# Patient Record
Sex: MALE | Race: WHITE | NOT HISPANIC OR LATINO | Employment: OTHER | URBAN - METROPOLITAN AREA
[De-identification: names, ages, dates, MRNs, and addresses within clinical notes are randomized per-mention and may not be internally consistent; named-entity substitution may affect disease eponyms.]

---

## 2017-05-01 ENCOUNTER — TRANSCRIBE ORDERS (OUTPATIENT)
Dept: ADMINISTRATIVE | Age: 77
End: 2017-05-01

## 2017-05-01 ENCOUNTER — LAB (OUTPATIENT)
Dept: LAB | Age: 77
End: 2017-05-01
Payer: MEDICARE

## 2017-05-01 DIAGNOSIS — N39.0 URINARY TRACT INFECTION, SITE NOT SPECIFIED: ICD-10-CM

## 2017-05-01 DIAGNOSIS — R53.83 FATIGUE, UNSPECIFIED TYPE: ICD-10-CM

## 2017-05-01 DIAGNOSIS — N39.0 URINARY TRACT INFECTION, SITE NOT SPECIFIED: Primary | ICD-10-CM

## 2017-05-01 LAB
ALBUMIN SERPL BCP-MCNC: 3.4 G/DL (ref 3.5–5)
ALP SERPL-CCNC: 47 U/L (ref 46–116)
ALT SERPL W P-5'-P-CCNC: 24 U/L (ref 12–78)
ANION GAP SERPL CALCULATED.3IONS-SCNC: 6 MMOL/L (ref 4–13)
AST SERPL W P-5'-P-CCNC: 16 U/L (ref 5–45)
BILIRUB SERPL-MCNC: 1.05 MG/DL (ref 0.2–1)
BUN SERPL-MCNC: 13 MG/DL (ref 5–25)
CALCIUM SERPL-MCNC: 8.8 MG/DL (ref 8.3–10.1)
CHLORIDE SERPL-SCNC: 105 MMOL/L (ref 100–108)
CO2 SERPL-SCNC: 29 MMOL/L (ref 21–32)
CREAT SERPL-MCNC: 0.92 MG/DL (ref 0.6–1.3)
GFR SERPL CREATININE-BSD FRML MDRD: >60 ML/MIN/1.73SQ M
GLUCOSE P FAST SERPL-MCNC: 103 MG/DL (ref 65–99)
LDLC SERPL DIRECT ASSAY-MCNC: 115 MG/DL (ref 0–100)
POTASSIUM SERPL-SCNC: 4.3 MMOL/L (ref 3.5–5.3)
PROT SERPL-MCNC: 6.8 G/DL (ref 6.4–8.2)
SODIUM SERPL-SCNC: 140 MMOL/L (ref 136–145)

## 2017-05-01 PROCEDURE — 36415 COLL VENOUS BLD VENIPUNCTURE: CPT

## 2017-05-01 PROCEDURE — 83721 ASSAY OF BLOOD LIPOPROTEIN: CPT

## 2017-05-01 PROCEDURE — 80053 COMPREHEN METABOLIC PANEL: CPT

## 2017-11-06 ENCOUNTER — APPOINTMENT (OUTPATIENT)
Dept: LAB | Age: 77
End: 2017-11-06
Payer: MEDICARE

## 2017-11-06 ENCOUNTER — TRANSCRIBE ORDERS (OUTPATIENT)
Dept: ADMINISTRATIVE | Age: 77
End: 2017-11-06

## 2017-11-06 DIAGNOSIS — D64.9 ANEMIA, UNSPECIFIED TYPE: ICD-10-CM

## 2017-11-06 DIAGNOSIS — N42.89 ATROPHY OF PROSTATE: ICD-10-CM

## 2017-11-06 DIAGNOSIS — I10 ESSENTIAL HYPERTENSION, MALIGNANT: ICD-10-CM

## 2017-11-06 DIAGNOSIS — E03.9 MYXEDEMA HEART DISEASE: ICD-10-CM

## 2017-11-06 DIAGNOSIS — E78.2 MIXED HYPERLIPIDEMIA: ICD-10-CM

## 2017-11-06 DIAGNOSIS — I51.9 MYXEDEMA HEART DISEASE: ICD-10-CM

## 2017-11-06 DIAGNOSIS — D64.9 ANEMIA, UNSPECIFIED TYPE: Primary | ICD-10-CM

## 2017-11-06 LAB
ALBUMIN SERPL BCP-MCNC: 3.6 G/DL (ref 3.5–5)
ALP SERPL-CCNC: 39 U/L (ref 46–116)
ALT SERPL W P-5'-P-CCNC: 24 U/L (ref 12–78)
ANION GAP SERPL CALCULATED.3IONS-SCNC: 5 MMOL/L (ref 4–13)
AST SERPL W P-5'-P-CCNC: 21 U/L (ref 5–45)
BASOPHILS # BLD AUTO: 0.04 THOUSANDS/ΜL (ref 0–0.1)
BASOPHILS NFR BLD AUTO: 1 % (ref 0–1)
BILIRUB SERPL-MCNC: 0.84 MG/DL (ref 0.2–1)
BILIRUB UR QL STRIP: NEGATIVE
BUN SERPL-MCNC: 12 MG/DL (ref 5–25)
CALCIUM SERPL-MCNC: 8.8 MG/DL (ref 8.3–10.1)
CHLORIDE SERPL-SCNC: 104 MMOL/L (ref 100–108)
CHOLEST SERPL-MCNC: 191 MG/DL (ref 50–200)
CLARITY UR: CLEAR
CO2 SERPL-SCNC: 28 MMOL/L (ref 21–32)
COLOR UR: YELLOW
CREAT SERPL-MCNC: 0.9 MG/DL (ref 0.6–1.3)
EOSINOPHIL # BLD AUTO: 0.24 THOUSAND/ΜL (ref 0–0.61)
EOSINOPHIL NFR BLD AUTO: 5 % (ref 0–6)
ERYTHROCYTE [DISTWIDTH] IN BLOOD BY AUTOMATED COUNT: 12.8 % (ref 11.6–15.1)
GFR SERPL CREATININE-BSD FRML MDRD: 83 ML/MIN/1.73SQ M
GLUCOSE P FAST SERPL-MCNC: 90 MG/DL (ref 65–99)
GLUCOSE UR STRIP-MCNC: NEGATIVE MG/DL
HCT VFR BLD AUTO: 43.4 % (ref 36.5–49.3)
HDLC SERPL-MCNC: 57 MG/DL (ref 40–60)
HGB BLD-MCNC: 15 G/DL (ref 12–17)
HGB UR QL STRIP.AUTO: NEGATIVE
KETONES UR STRIP-MCNC: NEGATIVE MG/DL
LDLC SERPL CALC-MCNC: 126 MG/DL (ref 0–100)
LEUKOCYTE ESTERASE UR QL STRIP: NEGATIVE
LYMPHOCYTES # BLD AUTO: 1.22 THOUSANDS/ΜL (ref 0.6–4.47)
LYMPHOCYTES NFR BLD AUTO: 27 % (ref 14–44)
MCH RBC QN AUTO: 33.9 PG (ref 26.8–34.3)
MCHC RBC AUTO-ENTMCNC: 34.6 G/DL (ref 31.4–37.4)
MCV RBC AUTO: 98 FL (ref 82–98)
MONOCYTES # BLD AUTO: 0.49 THOUSAND/ΜL (ref 0.17–1.22)
MONOCYTES NFR BLD AUTO: 11 % (ref 4–12)
NEUTROPHILS # BLD AUTO: 2.6 THOUSANDS/ΜL (ref 1.85–7.62)
NEUTS SEG NFR BLD AUTO: 56 % (ref 43–75)
NITRITE UR QL STRIP: NEGATIVE
NRBC BLD AUTO-RTO: 0 /100 WBCS
PH UR STRIP.AUTO: 7 [PH] (ref 4.5–8)
PLATELET # BLD AUTO: 172 THOUSANDS/UL (ref 149–390)
PMV BLD AUTO: 11.7 FL (ref 8.9–12.7)
POTASSIUM SERPL-SCNC: 4.2 MMOL/L (ref 3.5–5.3)
PROT SERPL-MCNC: 6.8 G/DL (ref 6.4–8.2)
PROT UR STRIP-MCNC: NEGATIVE MG/DL
RBC # BLD AUTO: 4.43 MILLION/UL (ref 3.88–5.62)
SODIUM SERPL-SCNC: 137 MMOL/L (ref 136–145)
SP GR UR STRIP.AUTO: 1.01 (ref 1–1.03)
T4 FREE SERPL-MCNC: 1.01 NG/DL (ref 0.76–1.46)
TRIGL SERPL-MCNC: 41 MG/DL
TSH SERPL DL<=0.05 MIU/L-ACNC: 1.67 UIU/ML (ref 0.36–3.74)
UROBILINOGEN UR QL STRIP.AUTO: 0.2 E.U./DL
WBC # BLD AUTO: 4.6 THOUSAND/UL (ref 4.31–10.16)

## 2017-11-06 PROCEDURE — 85025 COMPLETE CBC W/AUTO DIFF WBC: CPT

## 2017-11-06 PROCEDURE — 36415 COLL VENOUS BLD VENIPUNCTURE: CPT

## 2017-11-06 PROCEDURE — 84443 ASSAY THYROID STIM HORMONE: CPT

## 2017-11-06 PROCEDURE — 84153 ASSAY OF PSA TOTAL: CPT

## 2017-11-06 PROCEDURE — 81003 URINALYSIS AUTO W/O SCOPE: CPT | Performed by: INTERNAL MEDICINE

## 2017-11-06 PROCEDURE — 80053 COMPREHEN METABOLIC PANEL: CPT

## 2017-11-06 PROCEDURE — 84154 ASSAY OF PSA FREE: CPT

## 2017-11-06 PROCEDURE — 84439 ASSAY OF FREE THYROXINE: CPT

## 2017-11-06 PROCEDURE — 80061 LIPID PANEL: CPT

## 2017-11-07 LAB
PSA FREE MFR SERPL: 21.3 %
PSA FREE SERPL-MCNC: 0.17 NG/ML
PSA SERPL-MCNC: 0.8 NG/ML (ref 0–4)

## 2018-05-10 ENCOUNTER — APPOINTMENT (OUTPATIENT)
Dept: LAB | Age: 78
End: 2018-05-10
Payer: MEDICARE

## 2018-05-10 ENCOUNTER — TRANSCRIBE ORDERS (OUTPATIENT)
Dept: ADMINISTRATIVE | Age: 78
End: 2018-05-10

## 2018-05-10 DIAGNOSIS — I10 ESSENTIAL HYPERTENSION, MALIGNANT: ICD-10-CM

## 2018-05-10 DIAGNOSIS — E78.2 MIXED HYPERLIPIDEMIA: Primary | ICD-10-CM

## 2018-05-10 DIAGNOSIS — E78.2 MIXED HYPERLIPIDEMIA: ICD-10-CM

## 2018-05-10 LAB
ALBUMIN SERPL BCP-MCNC: 3.7 G/DL (ref 3.5–5)
ALP SERPL-CCNC: 44 U/L (ref 46–116)
ALT SERPL W P-5'-P-CCNC: 29 U/L (ref 12–78)
ANION GAP SERPL CALCULATED.3IONS-SCNC: 4 MMOL/L (ref 4–13)
AST SERPL W P-5'-P-CCNC: 25 U/L (ref 5–45)
BILIRUB SERPL-MCNC: 1.07 MG/DL (ref 0.2–1)
BUN SERPL-MCNC: 18 MG/DL (ref 5–25)
CALCIUM SERPL-MCNC: 9 MG/DL (ref 8.3–10.1)
CHLORIDE SERPL-SCNC: 104 MMOL/L (ref 100–108)
CO2 SERPL-SCNC: 29 MMOL/L (ref 21–32)
CREAT SERPL-MCNC: 0.95 MG/DL (ref 0.6–1.3)
GFR SERPL CREATININE-BSD FRML MDRD: 78 ML/MIN/1.73SQ M
GLUCOSE P FAST SERPL-MCNC: 88 MG/DL (ref 65–99)
LDLC SERPL DIRECT ASSAY-MCNC: 119 MG/DL (ref 0–100)
POTASSIUM SERPL-SCNC: 4.4 MMOL/L (ref 3.5–5.3)
PROT SERPL-MCNC: 7.1 G/DL (ref 6.4–8.2)
SODIUM SERPL-SCNC: 137 MMOL/L (ref 136–145)

## 2018-05-10 PROCEDURE — 83721 ASSAY OF BLOOD LIPOPROTEIN: CPT

## 2018-05-10 PROCEDURE — 36415 COLL VENOUS BLD VENIPUNCTURE: CPT

## 2018-05-10 PROCEDURE — 80053 COMPREHEN METABOLIC PANEL: CPT

## 2018-08-01 ENCOUNTER — LAB REQUISITION (OUTPATIENT)
Dept: LAB | Facility: HOSPITAL | Age: 78
End: 2018-08-01
Payer: MEDICARE

## 2018-08-01 DIAGNOSIS — K57.30 DIVERTICULOSIS OF LARGE INTESTINE WITHOUT PERFORATION OR ABSCESS WITHOUT BLEEDING: ICD-10-CM

## 2018-08-01 DIAGNOSIS — K64.4 RESIDUAL HEMORRHOIDAL SKIN TAGS: ICD-10-CM

## 2018-08-01 DIAGNOSIS — D12.3 BENIGN NEOPLASM OF TRANSVERSE COLON: ICD-10-CM

## 2018-08-01 DIAGNOSIS — Z86.010 HISTORY OF COLONIC POLYPS: ICD-10-CM

## 2018-08-01 PROCEDURE — 88305 TISSUE EXAM BY PATHOLOGIST: CPT | Performed by: PATHOLOGY

## 2018-12-06 ENCOUNTER — TRANSCRIBE ORDERS (OUTPATIENT)
Dept: ADMINISTRATIVE | Age: 78
End: 2018-12-06

## 2018-12-06 ENCOUNTER — APPOINTMENT (OUTPATIENT)
Dept: LAB | Age: 78
End: 2018-12-06
Payer: MEDICARE

## 2018-12-06 DIAGNOSIS — E03.9 MYXEDEMA HEART DISEASE: ICD-10-CM

## 2018-12-06 DIAGNOSIS — R35.0 URINARY FREQUENCY: ICD-10-CM

## 2018-12-06 DIAGNOSIS — I10 ESSENTIAL HYPERTENSION, MALIGNANT: ICD-10-CM

## 2018-12-06 DIAGNOSIS — D50.0 IRON DEFICIENCY ANEMIA DUE TO CHRONIC BLOOD LOSS: Primary | ICD-10-CM

## 2018-12-06 DIAGNOSIS — N40.0 BENIGN PROSTATIC HYPERPLASIA WITHOUT LOWER URINARY TRACT SYMPTOMS: ICD-10-CM

## 2018-12-06 DIAGNOSIS — E08.00 DIABETES MELLITUS DUE TO UNDERLYING CONDITION WITH HYPEROSMOLARITY WITHOUT COMA, WITHOUT LONG-TERM CURRENT USE OF INSULIN (HCC): ICD-10-CM

## 2018-12-06 DIAGNOSIS — I51.9 MYXEDEMA HEART DISEASE: ICD-10-CM

## 2018-12-06 DIAGNOSIS — M19.90 SENILE ARTHRITIS: ICD-10-CM

## 2018-12-06 DIAGNOSIS — D50.0 IRON DEFICIENCY ANEMIA DUE TO CHRONIC BLOOD LOSS: ICD-10-CM

## 2018-12-06 LAB
ALBUMIN SERPL BCP-MCNC: 3.6 G/DL (ref 3.5–5)
ALP SERPL-CCNC: 46 U/L (ref 46–116)
ALT SERPL W P-5'-P-CCNC: 30 U/L (ref 12–78)
ANION GAP SERPL CALCULATED.3IONS-SCNC: 5 MMOL/L (ref 4–13)
AST SERPL W P-5'-P-CCNC: 26 U/L (ref 5–45)
BASOPHILS # BLD AUTO: 0.06 THOUSANDS/ΜL (ref 0–0.1)
BASOPHILS NFR BLD AUTO: 1 % (ref 0–1)
BILIRUB SERPL-MCNC: 1.02 MG/DL (ref 0.2–1)
BILIRUB UR QL STRIP: NEGATIVE
BUN SERPL-MCNC: 14 MG/DL (ref 5–25)
CALCIUM SERPL-MCNC: 9.6 MG/DL (ref 8.3–10.1)
CHLORIDE SERPL-SCNC: 102 MMOL/L (ref 100–108)
CHOLEST SERPL-MCNC: 235 MG/DL (ref 50–200)
CLARITY UR: CLEAR
CO2 SERPL-SCNC: 28 MMOL/L (ref 21–32)
COLOR UR: YELLOW
CREAT SERPL-MCNC: 0.9 MG/DL (ref 0.6–1.3)
EOSINOPHIL # BLD AUTO: 0.28 THOUSAND/ΜL (ref 0–0.61)
EOSINOPHIL NFR BLD AUTO: 6 % (ref 0–6)
ERYTHROCYTE [DISTWIDTH] IN BLOOD BY AUTOMATED COUNT: 12.3 % (ref 11.6–15.1)
EST. AVERAGE GLUCOSE BLD GHB EST-MCNC: 111 MG/DL
GFR SERPL CREATININE-BSD FRML MDRD: 83 ML/MIN/1.73SQ M
GLUCOSE P FAST SERPL-MCNC: 90 MG/DL (ref 65–99)
GLUCOSE UR STRIP-MCNC: NEGATIVE MG/DL
HBA1C MFR BLD: 5.5 % (ref 4.2–6.3)
HCT VFR BLD AUTO: 45.5 % (ref 36.5–49.3)
HDLC SERPL-MCNC: 70 MG/DL (ref 40–60)
HGB BLD-MCNC: 15.3 G/DL (ref 12–17)
HGB UR QL STRIP.AUTO: NEGATIVE
IMM GRANULOCYTES # BLD AUTO: 0.03 THOUSAND/UL (ref 0–0.2)
IMM GRANULOCYTES NFR BLD AUTO: 1 % (ref 0–2)
KETONES UR STRIP-MCNC: NEGATIVE MG/DL
LDLC SERPL CALC-MCNC: 147 MG/DL (ref 0–100)
LDLC SERPL DIRECT ASSAY-MCNC: 144 MG/DL (ref 0–100)
LEUKOCYTE ESTERASE UR QL STRIP: NEGATIVE
LYMPHOCYTES # BLD AUTO: 1.32 THOUSANDS/ΜL (ref 0.6–4.47)
LYMPHOCYTES NFR BLD AUTO: 27 % (ref 14–44)
MCH RBC QN AUTO: 33.8 PG (ref 26.8–34.3)
MCHC RBC AUTO-ENTMCNC: 33.6 G/DL (ref 31.4–37.4)
MCV RBC AUTO: 100 FL (ref 82–98)
MONOCYTES # BLD AUTO: 0.48 THOUSAND/ΜL (ref 0.17–1.22)
MONOCYTES NFR BLD AUTO: 10 % (ref 4–12)
NEUTROPHILS # BLD AUTO: 2.64 THOUSANDS/ΜL (ref 1.85–7.62)
NEUTS SEG NFR BLD AUTO: 55 % (ref 43–75)
NITRITE UR QL STRIP: NEGATIVE
NONHDLC SERPL-MCNC: 165 MG/DL
NRBC BLD AUTO-RTO: 0 /100 WBCS
PH UR STRIP.AUTO: 7 [PH] (ref 4.5–8)
PMV BLD AUTO: 11.4 FL (ref 8.9–12.7)
POTASSIUM SERPL-SCNC: 4.2 MMOL/L (ref 3.5–5.3)
PROT SERPL-MCNC: 7.2 G/DL (ref 6.4–8.2)
PROT UR STRIP-MCNC: NEGATIVE MG/DL
PSA SERPL-MCNC: 0.8 NG/ML (ref 0–4)
RBC # BLD AUTO: 4.53 MILLION/UL (ref 3.88–5.62)
SODIUM SERPL-SCNC: 135 MMOL/L (ref 136–145)
SP GR UR STRIP.AUTO: 1.02 (ref 1–1.03)
T4 FREE SERPL-MCNC: 0.92 NG/DL (ref 0.76–1.46)
TRIGL SERPL-MCNC: 90 MG/DL
TSH SERPL DL<=0.05 MIU/L-ACNC: 2.09 UIU/ML (ref 0.36–3.74)
UROBILINOGEN UR QL STRIP.AUTO: 0.2 E.U./DL
WBC # BLD AUTO: 4.81 THOUSAND/UL (ref 4.31–10.16)

## 2018-12-06 PROCEDURE — 84439 ASSAY OF FREE THYROXINE: CPT

## 2018-12-06 PROCEDURE — 83036 HEMOGLOBIN GLYCOSYLATED A1C: CPT

## 2018-12-06 PROCEDURE — 84443 ASSAY THYROID STIM HORMONE: CPT

## 2018-12-06 PROCEDURE — 36415 COLL VENOUS BLD VENIPUNCTURE: CPT

## 2018-12-06 PROCEDURE — G0103 PSA SCREENING: HCPCS

## 2018-12-06 PROCEDURE — 83721 ASSAY OF BLOOD LIPOPROTEIN: CPT

## 2018-12-06 PROCEDURE — 81003 URINALYSIS AUTO W/O SCOPE: CPT | Performed by: INTERNAL MEDICINE

## 2018-12-06 PROCEDURE — 80053 COMPREHEN METABOLIC PANEL: CPT

## 2018-12-06 PROCEDURE — 85025 COMPLETE CBC W/AUTO DIFF WBC: CPT

## 2018-12-06 PROCEDURE — 80061 LIPID PANEL: CPT

## 2019-06-03 ENCOUNTER — APPOINTMENT (OUTPATIENT)
Dept: RADIOLOGY | Age: 79
End: 2019-06-03
Payer: MEDICARE

## 2019-06-03 ENCOUNTER — TRANSCRIBE ORDERS (OUTPATIENT)
Dept: ADMINISTRATIVE | Age: 79
End: 2019-06-03

## 2019-06-03 DIAGNOSIS — R52 PAIN: Primary | ICD-10-CM

## 2019-06-03 DIAGNOSIS — R52 PAIN: ICD-10-CM

## 2019-06-03 PROCEDURE — 72110 X-RAY EXAM L-2 SPINE 4/>VWS: CPT

## 2019-06-06 ENCOUNTER — APPOINTMENT (OUTPATIENT)
Dept: LAB | Age: 79
End: 2019-06-06
Payer: MEDICARE

## 2019-06-06 ENCOUNTER — TRANSCRIBE ORDERS (OUTPATIENT)
Dept: ADMINISTRATIVE | Age: 79
End: 2019-06-06

## 2019-06-06 DIAGNOSIS — E13.8 DIABETES MELLITUS OF OTHER TYPE WITH COMPLICATION, UNSPECIFIED WHETHER LONG TERM INSULIN USE: ICD-10-CM

## 2019-06-06 DIAGNOSIS — E78.2 MIXED HYPERLIPIDEMIA: ICD-10-CM

## 2019-06-06 DIAGNOSIS — I10 ESSENTIAL HYPERTENSION, MALIGNANT: ICD-10-CM

## 2019-06-06 DIAGNOSIS — E13.8 DIABETES MELLITUS OF OTHER TYPE WITH COMPLICATION, UNSPECIFIED WHETHER LONG TERM INSULIN USE: Primary | ICD-10-CM

## 2019-06-06 LAB
ALBUMIN SERPL BCP-MCNC: 4 G/DL (ref 3.5–5)
ALP SERPL-CCNC: 43 U/L (ref 46–116)
ALT SERPL W P-5'-P-CCNC: 26 U/L (ref 12–78)
ANION GAP SERPL CALCULATED.3IONS-SCNC: 7 MMOL/L (ref 4–13)
AST SERPL W P-5'-P-CCNC: 20 U/L (ref 5–45)
BILIRUB SERPL-MCNC: 1.26 MG/DL (ref 0.2–1)
BUN SERPL-MCNC: 14 MG/DL (ref 5–25)
CALCIUM SERPL-MCNC: 8.5 MG/DL (ref 8.3–10.1)
CHLORIDE SERPL-SCNC: 103 MMOL/L (ref 100–108)
CO2 SERPL-SCNC: 28 MMOL/L (ref 21–32)
CREAT SERPL-MCNC: 1.01 MG/DL (ref 0.6–1.3)
EST. AVERAGE GLUCOSE BLD GHB EST-MCNC: 94 MG/DL
GFR SERPL CREATININE-BSD FRML MDRD: 72 ML/MIN/1.73SQ M
GLUCOSE P FAST SERPL-MCNC: 96 MG/DL (ref 65–99)
HBA1C MFR BLD: 4.9 % (ref 4.2–6.3)
LDLC SERPL DIRECT ASSAY-MCNC: 116 MG/DL (ref 0–100)
POTASSIUM SERPL-SCNC: 4.7 MMOL/L (ref 3.5–5.3)
PROT SERPL-MCNC: 6.8 G/DL (ref 6.4–8.2)
SODIUM SERPL-SCNC: 138 MMOL/L (ref 136–145)

## 2019-06-06 PROCEDURE — 36415 COLL VENOUS BLD VENIPUNCTURE: CPT

## 2019-06-06 PROCEDURE — 83036 HEMOGLOBIN GLYCOSYLATED A1C: CPT

## 2019-06-06 PROCEDURE — 83721 ASSAY OF BLOOD LIPOPROTEIN: CPT

## 2019-06-06 PROCEDURE — 80053 COMPREHEN METABOLIC PANEL: CPT

## 2019-06-10 ENCOUNTER — EVALUATION (OUTPATIENT)
Dept: PHYSICAL THERAPY | Age: 79
End: 2019-06-10
Payer: MEDICARE

## 2019-06-10 DIAGNOSIS — M54.50 ACUTE RIGHT-SIDED LOW BACK PAIN WITHOUT SCIATICA: Primary | ICD-10-CM

## 2019-06-10 PROCEDURE — 97162 PT EVAL MOD COMPLEX 30 MIN: CPT | Performed by: PHYSICAL THERAPIST

## 2019-06-12 ENCOUNTER — OFFICE VISIT (OUTPATIENT)
Dept: PHYSICAL THERAPY | Age: 79
End: 2019-06-12
Payer: MEDICARE

## 2019-06-12 DIAGNOSIS — M54.50 ACUTE RIGHT-SIDED LOW BACK PAIN WITHOUT SCIATICA: Primary | ICD-10-CM

## 2019-06-12 PROCEDURE — 97140 MANUAL THERAPY 1/> REGIONS: CPT | Performed by: PHYSICAL THERAPIST

## 2019-06-12 PROCEDURE — 97110 THERAPEUTIC EXERCISES: CPT | Performed by: PHYSICAL THERAPIST

## 2019-06-17 ENCOUNTER — OFFICE VISIT (OUTPATIENT)
Dept: PHYSICAL THERAPY | Age: 79
End: 2019-06-17
Payer: MEDICARE

## 2019-06-17 DIAGNOSIS — M54.50 ACUTE RIGHT-SIDED LOW BACK PAIN WITHOUT SCIATICA: Primary | ICD-10-CM

## 2019-06-17 PROCEDURE — 97140 MANUAL THERAPY 1/> REGIONS: CPT | Performed by: PHYSICAL THERAPIST

## 2019-06-17 PROCEDURE — 97110 THERAPEUTIC EXERCISES: CPT | Performed by: PHYSICAL THERAPIST

## 2019-06-19 ENCOUNTER — OFFICE VISIT (OUTPATIENT)
Dept: PHYSICAL THERAPY | Age: 79
End: 2019-06-19
Payer: MEDICARE

## 2019-06-19 DIAGNOSIS — M54.50 ACUTE RIGHT-SIDED LOW BACK PAIN WITHOUT SCIATICA: Primary | ICD-10-CM

## 2019-06-19 PROCEDURE — 97140 MANUAL THERAPY 1/> REGIONS: CPT | Performed by: PHYSICAL THERAPIST

## 2019-06-19 PROCEDURE — 97110 THERAPEUTIC EXERCISES: CPT | Performed by: PHYSICAL THERAPIST

## 2019-06-24 ENCOUNTER — OFFICE VISIT (OUTPATIENT)
Dept: PHYSICAL THERAPY | Age: 79
End: 2019-06-24
Payer: MEDICARE

## 2019-06-24 DIAGNOSIS — M54.50 ACUTE RIGHT-SIDED LOW BACK PAIN WITHOUT SCIATICA: Primary | ICD-10-CM

## 2019-06-24 PROCEDURE — 97110 THERAPEUTIC EXERCISES: CPT | Performed by: PHYSICAL THERAPIST

## 2019-06-24 PROCEDURE — 97140 MANUAL THERAPY 1/> REGIONS: CPT | Performed by: PHYSICAL THERAPIST

## 2019-06-26 ENCOUNTER — OFFICE VISIT (OUTPATIENT)
Dept: PHYSICAL THERAPY | Age: 79
End: 2019-06-26
Payer: MEDICARE

## 2019-06-26 DIAGNOSIS — M54.50 ACUTE RIGHT-SIDED LOW BACK PAIN WITHOUT SCIATICA: Primary | ICD-10-CM

## 2019-06-26 PROCEDURE — 97110 THERAPEUTIC EXERCISES: CPT | Performed by: PHYSICAL THERAPIST

## 2019-06-26 PROCEDURE — 97140 MANUAL THERAPY 1/> REGIONS: CPT | Performed by: PHYSICAL THERAPIST

## 2019-07-01 ENCOUNTER — OFFICE VISIT (OUTPATIENT)
Dept: PHYSICAL THERAPY | Age: 79
End: 2019-07-01
Payer: MEDICARE

## 2019-07-01 DIAGNOSIS — M54.50 ACUTE RIGHT-SIDED LOW BACK PAIN WITHOUT SCIATICA: Primary | ICD-10-CM

## 2019-07-01 PROCEDURE — 97110 THERAPEUTIC EXERCISES: CPT

## 2019-07-01 PROCEDURE — 97140 MANUAL THERAPY 1/> REGIONS: CPT

## 2019-07-01 NOTE — PROGRESS NOTES
Daily Note     Today's date: 2019  Patient name: Afshin Rizzo  :   MRN: [id number]  Referring provider: Magdaleno Mendiola DO  Dx:   Encounter Diagnosis     ICD-10-CM    1  Acute right-sided low back pain without sciatica M54 5                   Subjective: Patient presents doing well overall  0/10 pain noted  He was at the gym this morning as was able to perform resistance exercises regularly without complaint  Objective: See treatment diary below      Assessment: Patient tolerated session very well without significant discomfort  Improved hamstring flexibility observed over the course of the tx  Patient demonstrated fatigue post treatment, exhibited good technique with therapeutic exercises and would benefit from continued PT      Plan: Continue per plan of care  Manual         LC 5 minutes 10 minutes 10 minutes 10 minutes 10 minutes 10'       Hamstring stretch x4 hold 30 sec completed completed completed 4 reps hold 30 sec  4x30"       Piriformis stretch x4 hold 30 sec  completed completed completed 4 reps hold 30 sec 4x30"       Prone quad stretch x4 hold 30 sec  completed completed completed 4 reps hold 30 sec 4x30"       Hip flexor stretch nt nt nt nt nt nt           Exercise Diary                           DKC x5 hold 10 sec x5 x5 x5 x5 hold 20 sec 5x20"       Seated trunk flexion nt nt nt nt         PT x15 hold 5 sec nt nt nt x15 hold 5 sec  15x5"       PT with hip flexion x10 2x10 2x10 2x10 2x10 2x10       PT with abd crunch   x10 2x10 2x10 2x10 2x10 2x10         Lifefitness - rotational stability - 2x10 2x10 2x10 2x10 2x10

## 2019-07-03 ENCOUNTER — OFFICE VISIT (OUTPATIENT)
Dept: PHYSICAL THERAPY | Age: 79
End: 2019-07-03
Payer: MEDICARE

## 2019-07-03 DIAGNOSIS — M54.50 ACUTE RIGHT-SIDED LOW BACK PAIN WITHOUT SCIATICA: Primary | ICD-10-CM

## 2019-07-03 PROCEDURE — 97140 MANUAL THERAPY 1/> REGIONS: CPT

## 2019-07-03 PROCEDURE — 97110 THERAPEUTIC EXERCISES: CPT

## 2019-07-03 NOTE — PROGRESS NOTES
Daily Note     Today's date: 7/3/2019  Patient name: Chet Nyhan  : [birth year]  MRN: [id number]  Referring provider: Teresa Allred DO  Dx:   Encounter Diagnosis     ICD-10-CM    1  Acute right-sided low back pain without sciatica M54 5                   Subjective: Patient presents feeling well overall  Mild discomfort in R side of lower back  Was able to walk on the TM for several mins at the gym without difficulty  He continues to remain active with exercises  Offers no new complaints  Objective: See treatment diary below      Assessment: Patient tolerated manual PROM of LE well without discomfort  Mild improvement in LBP after stretching  Continues to respond positively to PT intervention  Patient demonstrated fatigue post treatment, exhibited good technique with therapeutic exercises and would benefit from continued PT      Plan: Continue per plan of care  Manual  6/12 6/17 6/19 6/24 6/26 7/1 7/3      LC 5 minutes 10 minutes 10 minutes 10 minutes 10 minutes 10' 10'      Hamstring stretch x4 hold 30 sec completed completed completed 4 reps hold 30 sec  4x30" 4x30"      Piriformis stretch x4 hold 30 sec  completed completed completed 4 reps hold 30 sec 4x30" 4x30"      Prone quad stretch x4 hold 30 sec  completed completed completed 4 reps hold 30 sec 4x30" 4x30"      Hip flexor stretch nt nt nt nt nt nt nt          Exercise Diary                           DKC x5 hold 10 sec x5 x5 x5 x5 hold 20 sec 5x20" 5x20"      Seated trunk flexion nt nt nt nt         PT x15 hold 5 sec nt nt nt x15 hold 5 sec  15x5" 15x5"      PT with hip flexion x10 2x10 2x10 2x10 2x10 2x10 2x10      PT with abd crunch   x10 2x10 2x10 2x10 2x10 2x10 2x10        Lifefitness - rotational stability - 2x10 2x10 2x10 2x10 2x10 2x10

## 2019-07-10 ENCOUNTER — OFFICE VISIT (OUTPATIENT)
Dept: PHYSICAL THERAPY | Age: 79
End: 2019-07-10
Payer: MEDICARE

## 2019-07-10 DIAGNOSIS — M54.50 ACUTE RIGHT-SIDED LOW BACK PAIN WITHOUT SCIATICA: Primary | ICD-10-CM

## 2019-07-10 PROCEDURE — 97140 MANUAL THERAPY 1/> REGIONS: CPT | Performed by: PHYSICAL THERAPIST

## 2019-07-10 PROCEDURE — 97110 THERAPEUTIC EXERCISES: CPT | Performed by: PHYSICAL THERAPIST

## 2019-07-10 NOTE — PROGRESS NOTES
Daily Note     Today's date: 7/10/2019  Patient name: William Ward  :   MRN: [id number]  Referring provider: Galen Garcia DO  Dx:   Encounter Diagnosis     ICD-10-CM    1  Acute right-sided low back pain without sciatica M54 5                   Subjective: Continues to feel good      Objective: See treatment diary below      Assessment: Tolerated treatment well  D/C to HEP       Plan: D/C to HEP - goals achieved     Manual  6/12 6/17 6/19 6/24 6/26 7/1 7/3 7/10     LC 5 minutes 10 minutes 10 minutes 10 minutes 10 minutes 10' 10' 10 minutes     Hamstring stretch x4 hold 30 sec completed completed completed 4 reps hold 30 sec  4x30" 4x30" completed     Piriformis stretch x4 hold 30 sec  completed completed completed 4 reps hold 30 sec 4x30" 4x30" completed     Prone quad stretch x4 hold 30 sec  completed completed completed 4 reps hold 30 sec 4x30" 4x30" completed     Hip flexor stretch nt nt nt nt nt nt nt          Exercise Diary                           DKC x5 hold 10 sec x5 x5 x5 x5 hold 20 sec 5x20" 5x20" x5     Seated trunk flexion nt nt nt nt    nt     PT x15 hold 5 sec nt nt nt x15 hold 5 sec  15x5" 15x5" x15     PT with hip flexion x10 2x10 2x10 2x10 2x10 2x10 2x10 2x10     PT with abd crunch   x10 2x10 2x10 2x10 2x10 2x10 2x10 2x10       Lifefitness - rotational stability - 2x10 2x10 2x10 2x10 2x10 2x10 2x10

## 2019-08-15 ENCOUNTER — EVALUATION (OUTPATIENT)
Dept: PHYSICAL THERAPY | Age: 79
End: 2019-08-15
Payer: MEDICARE

## 2019-08-15 DIAGNOSIS — M54.50 ACUTE RIGHT-SIDED LOW BACK PAIN WITHOUT SCIATICA: Primary | ICD-10-CM

## 2019-08-15 PROCEDURE — 97110 THERAPEUTIC EXERCISES: CPT | Performed by: PHYSICAL THERAPIST

## 2019-08-15 NOTE — PROGRESS NOTES
Daily Note     Today's date: 8/15/2019  Patient name: Chet Nyhan  :   MRN: [id number]  Referring provider: Teresa Allred DO  Dx:   Encounter Diagnosis     ICD-10-CM    1  Acute right-sided low back pain without sciatica M54 5                   Subjective: Still overall       Objective: See treatment diary below      Assessment: Tolerated treatment well  Patient would benefit from continued PT      Plan: Continue per plan of care  Manual  8/15            LC 10 minutes            Hamstring stretch Supine 90/90  x5 hold 20 sec  Piriformis stretch nt            Prone quad stretch x5 hold 20 sec  Hip flexor stretch nt                Exercise Diary                           Barbara Orozco 57 nt            Seated trunk flexion nt            PT nt            PT with hip flexion nt            PT with abd crunch   nt

## 2019-08-21 ENCOUNTER — OFFICE VISIT (OUTPATIENT)
Dept: PHYSICAL THERAPY | Age: 79
End: 2019-08-21
Payer: MEDICARE

## 2019-08-21 DIAGNOSIS — M54.50 ACUTE RIGHT-SIDED LOW BACK PAIN WITHOUT SCIATICA: Primary | ICD-10-CM

## 2019-08-21 PROCEDURE — 97110 THERAPEUTIC EXERCISES: CPT | Performed by: SPECIALIST/TECHNOLOGIST

## 2019-08-21 PROCEDURE — 97140 MANUAL THERAPY 1/> REGIONS: CPT | Performed by: SPECIALIST/TECHNOLOGIST

## 2019-08-22 ENCOUNTER — OFFICE VISIT (OUTPATIENT)
Dept: PHYSICAL THERAPY | Age: 79
End: 2019-08-22
Payer: MEDICARE

## 2019-08-22 DIAGNOSIS — M54.50 ACUTE RIGHT-SIDED LOW BACK PAIN WITHOUT SCIATICA: Primary | ICD-10-CM

## 2019-08-22 PROCEDURE — 97110 THERAPEUTIC EXERCISES: CPT | Performed by: PHYSICAL THERAPIST

## 2019-08-22 PROCEDURE — 97140 MANUAL THERAPY 1/> REGIONS: CPT | Performed by: PHYSICAL THERAPIST

## 2019-08-22 NOTE — PROGRESS NOTES
Daily Note     Today's date: 2019  Patient name: Viki Chowdhury  : [birth year]  MRN: [id number]  Referring provider: Costa Serrano DO  Dx:   Encounter Diagnosis     ICD-10-CM    1  Acute right-sided low back pain without sciatica M54 5                   Subjective: Feeling better      Objective: See treatment diary below      Assessment: Tolerated treatment well  Patient would benefit from continued PT      Plan: Continue per plan of care  Manual  8/15 8/21 8/22          LC 10 minutes 10 min 10 minutes          Hamstring stretch Supine 90/90  x5 hold 20 sec  [id number] x5 reps hold 20 sec  Piriformis stretch nt 3x30" x5 hold 20 sec  Prone quad stretch x5 hold 20 sec  5x20" x5 hold 20 sec  Hip flexor stretch nt 3x30" nt              Exercise Diary                 Prone press ups - 2x10          DKC nt HEP           Seated trunk flexion nt HEP           PT nt HEP 2x10          PT with hip flexion nt HEP 2x10          PT with abd crunch   nt HEP 2x10

## 2019-08-28 ENCOUNTER — OFFICE VISIT (OUTPATIENT)
Dept: PHYSICAL THERAPY | Age: 79
End: 2019-08-28
Payer: MEDICARE

## 2019-08-28 DIAGNOSIS — M54.50 ACUTE RIGHT-SIDED LOW BACK PAIN WITHOUT SCIATICA: Primary | ICD-10-CM

## 2019-08-28 PROCEDURE — 97110 THERAPEUTIC EXERCISES: CPT | Performed by: PHYSICAL THERAPIST

## 2019-08-28 PROCEDURE — 97140 MANUAL THERAPY 1/> REGIONS: CPT | Performed by: PHYSICAL THERAPIST

## 2019-08-28 NOTE — PROGRESS NOTES
Daily Note     Today's date: 2019  Patient name: Sunitha Postal  :   MRN: [id number]  Referring provider: Archie Hill DO  Dx:   Encounter Diagnosis     ICD-10-CM    1  Acute right-sided low back pain without sciatica M54 5                   Subjective: Feeling better      Objective: See treatment diary below      Assessment: Tolerated treatment well  Patient would benefit from continued PT      Plan: Continue per plan of care  Manual  8/15 8/21 8/22 8/28         LC 10 minutes 10 min 10 minutes 10 minutes         Hamstring stretch Supine 90/90  x5 hold 20 sec  [id number] x5 reps hold 20 sec  x5         Piriformis stretch nt 3x30" x5 hold 20 sec  x5         Prone quad stretch x5 hold 20 sec  5x20" x5 hold 20 sec  x5         Hip flexor stretch nt 3x30" nt              Exercise Diary                 Prone press ups - 2x10 2x10         DKC nt HEP  nt         Seated trunk flexion nt HEP  nt         PT nt HEP 2x10 2x10         PT with hip flexion nt HEP 2x10 2x10         PT with abd crunch   nt HEP 2x10 2x10

## 2019-08-30 ENCOUNTER — OFFICE VISIT (OUTPATIENT)
Dept: PHYSICAL THERAPY | Age: 79
End: 2019-08-30
Payer: MEDICARE

## 2019-08-30 DIAGNOSIS — M54.50 ACUTE RIGHT-SIDED LOW BACK PAIN WITHOUT SCIATICA: Primary | ICD-10-CM

## 2019-08-30 PROCEDURE — 97110 THERAPEUTIC EXERCISES: CPT | Performed by: PHYSICAL THERAPIST

## 2019-08-30 PROCEDURE — 97140 MANUAL THERAPY 1/> REGIONS: CPT | Performed by: PHYSICAL THERAPIST

## 2019-08-30 NOTE — PROGRESS NOTES
Daily Note     Today's date: 2019  Patient name: Abdiel Jarquin  : [birth year]  MRN: [id number]  Referring provider: Willis Vo DO  Dx:   Encounter Diagnosis     ICD-10-CM    1  Acute right-sided low back pain without sciatica M54 5        Start Time: 0715  Stop Time: 0800  Total time in clinic (min): 45 minutes    Subjective: Flexibility improving      Objective: See treatment diary below      Assessment: Tolerated treatment well  Patient would benefit from continued PT      Plan: Continue per plan of care  Manual  8/15 8/21 8/22 8/28 8/30        LC 10 minutes 10 min 10 minutes 10 minutes 10        Hamstring stretch Supine 90/90  x5 hold 20 sec  [id number] x5 reps hold 20 sec  x5 x5        Piriformis stretch nt 3x30" x5 hold 20 sec  x5 x5        Prone quad stretch x5 hold 20 sec  5x20" x5 hold 20 sec  x5 x5        Hip flexor stretch nt 3x30" nt  nt            Exercise Diary                 Prone press ups - 2x10 2x10 2x10        DKC nt HEP  nt nt        Seated trunk flexion nt HEP  nt nt        PT nt HEP 2x10 2x10 2x10        PT with hip flexion nt HEP 2x10 2x10 2x10        PT with abd crunch   nt HEP 2x10 2x10 2x10                          Lifefitness - rotational - 2x10

## 2019-09-06 ENCOUNTER — OFFICE VISIT (OUTPATIENT)
Dept: PHYSICAL THERAPY | Age: 79
End: 2019-09-06
Payer: MEDICARE

## 2019-09-06 DIAGNOSIS — M54.50 ACUTE RIGHT-SIDED LOW BACK PAIN WITHOUT SCIATICA: Primary | ICD-10-CM

## 2019-09-06 PROCEDURE — 97140 MANUAL THERAPY 1/> REGIONS: CPT | Performed by: PHYSICAL THERAPIST

## 2019-09-06 PROCEDURE — 97110 THERAPEUTIC EXERCISES: CPT | Performed by: PHYSICAL THERAPIST

## 2019-09-06 NOTE — PROGRESS NOTES
Daily Note     Today's date: 2019  Patient name: Cassandra Lake  :   MRN: [id number]  Referring provider: Kurt Trejo DO  Dx:   Encounter Diagnosis     ICD-10-CM    1  Acute right-sided low back pain without sciatica M54 5                   Subjective: Better - functional improvement noted  Objective: See treatment diary below      Assessment: Tolerated treatment well  Patient would benefit from continued PT      Plan: Continue per plan of care  Manual  8/15 8/21 8/22 8/28 8/30 9/6       LC 10 minutes 10 min 10 minutes 10 minutes 10 10 minutes       Hamstring stretch Supine 90/90  x5 hold 20 sec  [id number] x5 reps hold 20 sec  x5 x5 x5       Piriformis stretch nt 3x30" x5 hold 20 sec  x5 x5 x5       Prone quad stretch x5 hold 20 sec  5x20" x5 hold 20 sec  x5 x5 x5       Hip flexor stretch nt 3x30" nt  nt            Exercise Diary                 Prone press ups - 2x10 2x10 2x10 2x10       DKC nt HEP  nt nt nt       Seated trunk flexion nt HEP  nt nt nt       PT nt HEP 2x10 2x10 2x10 nt       PT with hip flexion nt HEP 2x10 2x10 2x10 2x10       PT with abd crunch   nt HEP 2x10 2x10 2x10 2x10                         Lifefitness - rotational - 2x10 2x10

## 2019-09-13 ENCOUNTER — OFFICE VISIT (OUTPATIENT)
Dept: PHYSICAL THERAPY | Age: 79
End: 2019-09-13
Payer: MEDICARE

## 2019-09-13 DIAGNOSIS — M54.50 ACUTE RIGHT-SIDED LOW BACK PAIN WITHOUT SCIATICA: Primary | ICD-10-CM

## 2019-09-13 PROCEDURE — 97110 THERAPEUTIC EXERCISES: CPT | Performed by: PHYSICAL THERAPIST

## 2019-09-13 PROCEDURE — 97140 MANUAL THERAPY 1/> REGIONS: CPT | Performed by: PHYSICAL THERAPIST

## 2019-09-13 NOTE — PROGRESS NOTES
Daily Note     Today's date: 2019  Patient name: Kathy Masterson  : [birth year]  MRN: [id number]  Referring provider: Elma Head DO  Dx:   Encounter Diagnosis     ICD-10-CM    1  Acute right-sided low back pain without sciatica M54 5                   Subjective: Minimal c/o pain noted  Objective: See treatment diary below      Assessment: Tolerated treatment well  Patient would benefit from continued PT      Plan: Continue per plan of care  Manual  8/15 8/21 8/22 8/28 8/30 9/6 9/13      LC 10 minutes 10 min 10 minutes 10 minutes 10 10 minutes 10 minutes      Hamstring stretch Supine 90/90  x5 hold 20 sec  [id number] x5 reps hold 20 sec  x5 x5 x5 x5      Piriformis stretch nt 3x30" x5 hold 20 sec  x5 x5 x5 x5      Prone quad stretch x5 hold 20 sec  5x20" x5 hold 20 sec  x5 x5 x5 x5      Hip flexor stretch nt 3x30" nt  nt            Exercise Diary                 Prone press ups - 2x10 2x10 2x10 2x10 2x10      DKC nt HEP  nt nt nt nt      Seated trunk flexion nt HEP  nt nt nt nt      PT nt HEP 2x10 2x10 2x10 nt nt      PT with hip flexion nt HEP 2x10 2x10 2x10 2x10 2x10      PT with abd crunch   nt HEP 2x10 2x10 2x10 2x10 2x10                        Lifefitness - rotational - 2x10 2x10 2x10

## 2019-09-20 ENCOUNTER — OFFICE VISIT (OUTPATIENT)
Dept: PHYSICAL THERAPY | Age: 79
End: 2019-09-20
Payer: MEDICARE

## 2019-09-20 DIAGNOSIS — M54.50 ACUTE RIGHT-SIDED LOW BACK PAIN WITHOUT SCIATICA: Primary | ICD-10-CM

## 2019-09-20 PROCEDURE — 97140 MANUAL THERAPY 1/> REGIONS: CPT | Performed by: PHYSICAL THERAPIST

## 2019-09-20 PROCEDURE — 97110 THERAPEUTIC EXERCISES: CPT | Performed by: PHYSICAL THERAPIST

## 2019-09-20 NOTE — PROGRESS NOTES
Daily Note     Today's date: 2019  Patient name: Prosper Storey  : [birth year]  MRN: [id number]  Referring provider: Nahed Polo DO  Dx:   Encounter Diagnosis     ICD-10-CM    1  Acute right-sided low back pain without sciatica M54 5                   Subjective: Feeling good - no complaints      Objective: See treatment diary below      Assessment: Tolerated treatment well  Patient would benefit from continued PT      Plan: Continue per plan of care  Manual  8/15 8/21 8/22 8/28 8/30 9/6 9/13 9/20     LC 10 minutes 10 min 10 minutes 10 minutes 10 10 minutes 10 minutes 10 minutes     Hamstring stretch Supine 90/90  x5 hold 20 sec  [id number] x5 reps hold 20 sec  x5 x5 x5 x5 x5     Piriformis stretch nt 3x30" x5 hold 20 sec  x5 x5 x5 x5 x5     Prone quad stretch x5 hold 20 sec  5x20" x5 hold 20 sec  x5 x5 x5 x5 x5     Hip flexor stretch nt 3x30" nt  nt            Exercise Diary                 Prone press ups - 2x10 2x10 2x10 2x10 2x10 2x10     DKC nt HEP  nt nt nt nt nt     Seated trunk flexion nt HEP  nt nt nt nt nt     PT nt HEP 2x10 2x10 2x10 nt nt nt     PT with hip flexion nt HEP 2x10 2x10 2x10 2x10 2x10 2x10     PT with abd crunch   nt HEP 2x10 2x10 2x10 2x10 2x10 2x10                       Lifefitness - rotational - 2x10 2x10 2x10 2x10

## 2019-09-26 ENCOUNTER — OFFICE VISIT (OUTPATIENT)
Dept: PHYSICAL THERAPY | Age: 79
End: 2019-09-26
Payer: MEDICARE

## 2019-09-26 DIAGNOSIS — M54.50 ACUTE RIGHT-SIDED LOW BACK PAIN WITHOUT SCIATICA: Primary | ICD-10-CM

## 2019-09-26 PROCEDURE — 97110 THERAPEUTIC EXERCISES: CPT | Performed by: PHYSICAL THERAPIST

## 2019-09-26 PROCEDURE — 97140 MANUAL THERAPY 1/> REGIONS: CPT | Performed by: PHYSICAL THERAPIST

## 2019-09-26 NOTE — PROGRESS NOTES
Daily Note     Today's date: 2019  Patient name: Kylah Cross  : [birth year]  MRN: [id number]  Referring provider: Asa Lora DO  Dx:   Encounter Diagnosis     ICD-10-CM    1  Acute right-sided low back pain without sciatica M54 5                   Subjective: Feeling good - ready for D/C to HEP  Objective: See treatment diary below      Assessment: Tolerated treatment well  Goals achieved  Plan: D/C to HEP     Manual  8/15 8/21 8/22 8/28 8/30 9/6 9/13 9/20 9/26    LC 10 minutes 10 min 10 minutes 10 minutes 10 10 minutes 10 minutes 10 minutes 10 minutes    Hamstring stretch Supine 90/90  x5 hold 20 sec  [id number] x5 reps hold 20 sec  x5 x5 x5 x5 x5 x5    Piriformis stretch nt 3x30" x5 hold 20 sec  x5 x5 x5 x5 x5 x5    Prone quad stretch x5 hold 20 sec  5x20" x5 hold 20 sec  x5 x5 x5 x5 x5 x5    Hip flexor stretch nt 3x30" nt  nt            Exercise Diary                 Prone press ups - 2x10 2x10 2x10 2x10 2x10 2x10 2x10    DKC nt HEP  nt nt nt nt nt nt    Seated trunk flexion nt HEP  nt nt nt nt nt nt    PT nt HEP 2x10 2x10 2x10 nt nt nt nt    PT with hip flexion nt HEP 2x10 2x10 2x10 2x10 2x10 2x10 2x10    PT with abd crunch   nt HEP 2x10 2x10 2x10 2x10 2x10 2x10 2x10                      Lifefitness - rotational - 2x10 2x10 2x10 2x10 2x10

## 2020-01-05 ENCOUNTER — APPOINTMENT (EMERGENCY)
Dept: RADIOLOGY | Facility: HOSPITAL | Age: 80
End: 2020-01-05
Payer: MEDICARE

## 2020-01-05 ENCOUNTER — HOSPITAL ENCOUNTER (EMERGENCY)
Facility: HOSPITAL | Age: 80
Discharge: HOME | End: 2020-01-05
Attending: EMERGENCY MEDICINE | Admitting: EMERGENCY MEDICINE
Payer: MEDICARE

## 2020-01-05 VITALS
BODY MASS INDEX: 31.79 KG/M2 | WEIGHT: 247.58 LBS | OXYGEN SATURATION: 99 % | HEART RATE: 68 BPM | TEMPERATURE: 97.6 F | RESPIRATION RATE: 18 BRPM | DIASTOLIC BLOOD PRESSURE: 80 MMHG | SYSTOLIC BLOOD PRESSURE: 171 MMHG

## 2020-01-05 DIAGNOSIS — B34.9 VIRAL SYNDROME: Primary | ICD-10-CM

## 2020-01-05 PROCEDURE — 71046 X-RAY EXAM CHEST 2 VIEWS: CPT

## 2020-01-05 PROCEDURE — 99284 EMERGENCY DEPT VISIT MOD MDM: CPT | Performed by: PHYSICIAN ASSISTANT

## 2020-01-05 PROCEDURE — 99283 EMERGENCY DEPT VISIT LOW MDM: CPT

## 2020-01-05 RX ORDER — FLUTICASONE PROPIONATE 50 MCG
1 SPRAY, SUSPENSION (ML) NASAL DAILY
Status: DISCONTINUED | OUTPATIENT
Start: 2020-01-05 | End: 2020-01-05 | Stop reason: HOSPADM

## 2020-01-05 RX ADMIN — FLUTICASONE PROPIONATE 1 SPRAY: 50 SPRAY, METERED NASAL at 10:53

## 2020-01-05 NOTE — DISCHARGE INSTRUCTIONS
You may continue to use sure Coricidin HBP as needed for cough and congestion as directed on the box  He may continue the Cepacol lozenges as needed for comfort  Continue to take Tylenol 650 mg every 6 hours as needed for fever or myalgias  You may use a tsp of honey every hour as needed for comfort    Use the Flonase 1 spray in each nostril once daily as needed for nasal congestion

## 2020-01-05 NOTE — ED PROVIDER NOTES
History  Chief Complaint   Patient presents with    Flu Symptoms     started 3-4 days ago and he "wants it to end"  taking corcidin, tylenol and cepacol for the same  denies sob and fevers, feels "full of mucous" but getting looser  History provided by:  Patient  GEOREG   Presenting symptoms: congestion, cough, fever and rhinorrhea    Presenting symptoms: no ear pain, no facial pain, no fatigue and no sore throat    Congestion:     Location:  Nasal and chest    Interferes with sleep: yes      Interferes with eating/drinking: no    Cough:     Cough characteristics:  Productive    Sputum characteristics:  Nondescript    Severity:  Moderate    Onset quality:  Gradual    Duration:  3 days    Timing:  Intermittent    Progression:  Waxing and waning    Chronicity:  New  Severity:  Moderate  Onset quality:  Gradual  Duration:  3 days  Timing:  Constant  Progression:  Unchanged  Chronicity:  New  Relieved by: Coricidin, Cepacol, Tylenol  Worsened by:  Certain positions  Associated symptoms: no arthralgias, no headaches, no myalgias, no neck pain, no sinus pain, no sneezing, no swollen glands and no wheezing    Risk factors: not elderly, no chronic cardiac disease, no chronic kidney disease, no chronic respiratory disease, no diabetes mellitus, no immunosuppression, no recent illness, no recent travel and no sick contacts        Prior to Admission Medications   Prescriptions Last Dose Informant Patient Reported? Taking?    Calcium Citrate-Vitamin D (CALCIUM CITRATE MAXIMUM/VIT D) 315-250 MG-UNIT TABS 1/4/2020 at Unknown time  Yes Yes   Sig: Take by mouth   Lutein 10 MG TABS   Yes No   Sig: Take by mouth   Multiple Vitamin (MULTIVITAMIN) tablet 1/5/2020 at Unknown time  Yes Yes   Sig: Take 1 tablet by mouth daily   Omega-3 Fatty Acids (OMEGA-3 FISH OIL) 1200 MG CAPS 1/5/2020 at Unknown time Self Yes Yes   Sig: Take 2 capsules by mouth 2 (two) times a day    TURMERIC PO 1/5/2020 at Unknown time  Yes Yes   Sig: Take 1 tablet by mouth daily    aspirin 81 mg chewable tablet 1/4/2020 at Unknown time  Yes Yes   Sig: Chew 81 mg daily   lisinopril (ZESTRIL) 10 mg tablet 1/4/2020 at Unknown time  Yes Yes   Sig: Take 10 mg by mouth daily       Facility-Administered Medications: None       Past Medical History:   Diagnosis Date    Back ache     Hypertension        History reviewed  No pertinent surgical history  History reviewed  No pertinent family history  I have reviewed and agree with the history as documented  Social History     Tobacco Use    Smoking status: Never Smoker    Smokeless tobacco: Never Used   Substance Use Topics    Alcohol use: Yes     Alcohol/week: 4 0 standard drinks     Types: 4 Standard drinks or equivalent per week    Drug use: Never        Review of Systems   Constitutional: Positive for fever  Negative for activity change, appetite change, chills and fatigue  HENT: Positive for congestion, postnasal drip and rhinorrhea  Negative for dental problem, ear pain, mouth sores, sinus pain, sneezing and sore throat  Eyes: Negative for pain, discharge, redness and itching  Respiratory: Positive for cough  Negative for chest tightness, shortness of breath and wheezing  Cardiovascular: Negative for chest pain  Gastrointestinal: Negative for diarrhea and vomiting  Musculoskeletal: Negative for arthralgias, myalgias and neck pain  Skin: Negative for rash  Neurological: Negative for headaches  Psychiatric/Behavioral: Negative for confusion  All other systems reviewed and are negative  Physical Exam  Physical Exam   Constitutional: He is oriented to person, place, and time  He appears well-developed and well-nourished  No distress  HENT:   Head: Normocephalic  Right Ear: External ear normal    Left Ear: External ear normal    Clear rhinorrhea, clear postnasal drip, no erythema or  Mucous membranes are moist    Eyes: Conjunctivae are normal  Right eye exhibits no discharge   Left eye exhibits no discharge  Neck: Neck supple  Cardiovascular: Normal rate, regular rhythm and normal heart sounds  Exam reveals no gallop and no friction rub  No murmur heard  Pulmonary/Chest: Effort normal and breath sounds normal  No stridor  No respiratory distress  He has no wheezes  He has no rales  Lymphadenopathy:     He has no cervical adenopathy  Neurological: He is alert and oriented to person, place, and time  Skin: Skin is warm  Capillary refill takes less than 2 seconds  He is not diaphoretic  Psychiatric: He has a normal mood and affect  His behavior is normal  Thought content normal    Nursing note and vitals reviewed  Vital Signs  ED Triage Vitals [01/05/20 0955]   Temperature Pulse Respirations Blood Pressure SpO2   97 6 °F (36 4 °C) 68 18 (!) 171/80 99 %      Temp Source Heart Rate Source Patient Position - Orthostatic VS BP Location FiO2 (%)   Oral Monitor -- Right arm --      Pain Score       No Pain           Vitals:    01/05/20 0955   BP: (!) 171/80   Pulse: 68         Visual Acuity      ED Medications  Medications - No data to display    Diagnostic Studies  Results Reviewed     None                 XR chest 2 views   ED Interpretation by Lynda Laughlin PA-C (01/05 1045)   No acute cardiopulmonary disease                 Procedures  Procedures         ED Course                               MDM  Number of Diagnoses or Management Options  Viral syndrome: new and requires workup     Amount and/or Complexity of Data Reviewed  Tests in the radiology section of CPT®: ordered and reviewed    Risk of Complications, Morbidity, and/or Mortality  Presenting problems: moderate  Diagnostic procedures: moderate  Management options: moderate  General comments: Patient presents emergency room with a 3-4 day history of flu-like symptoms  He has been taking Coricidin HBP, Cepacol lozenges, Tylenol for his symptoms  He is concerned because the symptoms are persisting    He was seen and evaluated  X-ray demonstrated no evidence of pneumonia  Patient was instructed that he would have symptoms anywhere from 7-10 days  Was given Flonase nasal spray in the emergency room  He will continue Flonase daily as needed for nasal   He will continue his Coricidin HBP , Tylenol, Cepacol lozenges  Should his symptoms worsen, he will return to the emergency room for repeat exam   Should have a recheck exam with his physician in 2 days  Patient Progress  Patient progress: stable        Disposition  Final diagnoses:   Viral syndrome - Presumed influenza     Time reflects when diagnosis was documented in both MDM as applicable and the Disposition within this note     Time User Action Codes Description Comment    1/5/2020 10:45 AM Cordella Bran Add [B34 9] Viral syndrome     1/5/2020 10:45 AM Cordella Bran Modify [B34 9] Viral syndrome Presumed influenza      ED Disposition     ED Disposition Condition Date/Time Comment    Discharge Stable Sun Jan 5, 2020 10:45 AM Donna Brody discharge to home/self care  Follow-up Information    None         Discharge Medication List as of 1/5/2020 10:46 AM      CONTINUE these medications which have NOT CHANGED    Details   aspirin 81 mg chewable tablet Chew 81 mg daily, Historical Med      Calcium Citrate-Vitamin D (CALCIUM CITRATE MAXIMUM/VIT D) 315-250 MG-UNIT TABS Take by mouth, Historical Med      lisinopril (ZESTRIL) 10 mg tablet Take 10 mg by mouth daily , Starting Mon 8/26/2019, Historical Med      Multiple Vitamin (MULTIVITAMIN) tablet Take 1 tablet by mouth daily, Historical Med      Omega-3 Fatty Acids (OMEGA-3 FISH OIL) 1200 MG CAPS Take 2 capsules by mouth 2 (two) times a day , Historical Med      TURMERIC PO Take 1 tablet by mouth daily , Historical Med      Lutein 10 MG TABS Take by mouth, Historical Med           No discharge procedures on file      ED Provider  Electronically Signed by           Carlos Forte PA-C  01/05/20 8531

## 2020-01-09 ENCOUNTER — EVALUATION (OUTPATIENT)
Dept: PHYSICAL THERAPY | Age: 80
End: 2020-01-09
Payer: MEDICARE

## 2020-01-09 DIAGNOSIS — M54.50 ACUTE RIGHT-SIDED LOW BACK PAIN WITHOUT SCIATICA: Primary | ICD-10-CM

## 2020-01-09 PROCEDURE — 97161 PT EVAL LOW COMPLEX 20 MIN: CPT | Performed by: PHYSICAL THERAPIST

## 2020-01-09 NOTE — PROGRESS NOTES
PT Evaluation     Today's date: 2020  Patient name: Ragini Ribeiro  : 2026  MRN: [id number]  Referring provider: Tutu Peter DO  Dx:   Encounter Diagnosis     ICD-10-CM    1  Acute right-sided low back pain without sciatica M54 5                   Assessment  Assessment details: Patient presents with R L/S strain - decreased AROM, PROM, weakness, and postural dysfunction  Impairments: abnormal or restricted ROM, impaired physical strength, lacks appropriate home exercise program, pain with function and poor posture   Understanding of Dx/Px/POC: good   Prognosis: good    Goals  Short Term goals - 4 weeks  1  Patient will be independent HEP  2   Patient will report a 50% decrease in pain complaints  3   Increase strength 1/2 grade  4   Increase ROM 5-10 degrees  Long Term goals - 8 weeks  1  Patient will report elimination of pain complaints  2   Patient will return to all work related activities without restriction  3   Patient will return to all recreational activities without restriction  4   ROM WFL  5   Strength 5/5  Plan  Planned therapy interventions: joint mobilization, manual therapy, strengthening and stretching  Frequency: 2x week  Duration in weeks: 6        Subjective Evaluation    History of Present Illness  Mechanism of injury: Patient known from previous PT course for similar sx's - Patient reports approx 2 weeks history of R L/S spine pain/tightness  Sx's aggravated by sitting, driving, R sidelying, and R rotation  Neg falls  Neg recent testing  Neg radiating sx's  Neg c/o LE weakness  MEDS:  None    Quality of life: good    Pain  Current pain ratin  At best pain ratin  At worst pain ratin  Quality: tight, sharp, discomfort and dull ache  Progression: no change    Patient Goals  Patient goals for therapy: decreased pain, increased motion, increased strength and independence with ADLs/IADLs          Objective     Active Range of Motion     Lumbar Flexion:  Restriction level: moderate  Extension:  Restriction level: maximal  Left lateral flexion:  with pain Restriction level: moderate  Right lateral flexion:  Restriction level: moderate  Left rotation:  with pain Restriction level: minimal  Right rotation:  Restriction level: minimal    Strength/Myotome Testing     Left Hip   Planes of Motion   Flexion: 4+  Abduction: 5    Right Hip   Planes of Motion   Flexion: 4+  Abduction: 5    Left Knee   Flexion: 5  Extension: 5    Right Knee   Flexion: 5  Extension: 5    Left Ankle/Foot   Dorsiflexion: 5  Plantar flexion: 5    Right Ankle/Foot   Dorsiflexion: 5  Plantar flexion: 5    Tests     Lumbar     Left   Negative crossed SLR, passive SLR and slump test      Right   Negative crossed SLR, passive SLR and slump test      Left Hip   Negative NOEL and FADIR  Right Hip   Negative NOEL and FADIR  Precautions: None      Manual                           LC                                                        Exercise Diary                           Prone quad stretch             Supine piriformis stretch             1/2 kneeling hip flexor stretch                                                    PT with hip flexion             PT with abd crunch             Bridging with hip flexion                          Prone alt                                                                                                                           Modalities

## 2020-01-13 ENCOUNTER — OFFICE VISIT (OUTPATIENT)
Dept: PHYSICAL THERAPY | Age: 80
End: 2020-01-13
Payer: MEDICARE

## 2020-01-13 DIAGNOSIS — M54.50 ACUTE RIGHT-SIDED LOW BACK PAIN WITHOUT SCIATICA: Primary | ICD-10-CM

## 2020-01-13 PROCEDURE — 97110 THERAPEUTIC EXERCISES: CPT | Performed by: PHYSICAL THERAPIST

## 2020-01-14 NOTE — PROGRESS NOTES
Daily Note     Today's date: 2020  Patient name: Mason Boone  :   MRN: [id number]  Referring provider: Kiera Machado DO  Dx:   Encounter Diagnosis     ICD-10-CM    1  Acute right-sided low back pain without sciatica M54 5                   Subjective: Feeling better, but, wife is dealing with some medical issues  Objective: See treatment diary below      Assessment: Tolerated treatment well  Patient would benefit from continued PT      Plan: Continue per plan of care  Precautions: None      Manual                           LC 10 minutes                                                       Exercise Diary                           Prone quad stretch x5 hold 30 sec  Supine piriformis stretch x5 hold 30 sec            1/2 kneeling hip flexor stretch x5 reps hold 30 sec  PT with hip flexion nt            PT with abd crunch With yellow bar - 3x10            Bridging with hip flexion On physioball 3x10                         Prone alt   3x10                                                                                                                        Modalities

## 2020-01-15 ENCOUNTER — APPOINTMENT (OUTPATIENT)
Dept: LAB | Age: 80
End: 2020-01-15
Payer: MEDICARE

## 2020-01-15 ENCOUNTER — TRANSCRIBE ORDERS (OUTPATIENT)
Dept: ADMINISTRATIVE | Age: 80
End: 2020-01-15

## 2020-01-15 ENCOUNTER — OFFICE VISIT (OUTPATIENT)
Dept: PHYSICAL THERAPY | Age: 80
End: 2020-01-15
Payer: MEDICARE

## 2020-01-15 DIAGNOSIS — D64.9 ANEMIA, UNSPECIFIED TYPE: ICD-10-CM

## 2020-01-15 DIAGNOSIS — I10 ESSENTIAL HYPERTENSION, MALIGNANT: ICD-10-CM

## 2020-01-15 DIAGNOSIS — E13.69 OTHER SPECIFIED DIABETES MELLITUS WITH OTHER SPECIFIED COMPLICATION, UNSPECIFIED WHETHER LONG TERM INSULIN USE (HCC): ICD-10-CM

## 2020-01-15 DIAGNOSIS — E78.2 MIXED HYPERLIPIDEMIA: ICD-10-CM

## 2020-01-15 DIAGNOSIS — E13.69 OTHER SPECIFIED DIABETES MELLITUS WITH OTHER SPECIFIED COMPLICATION, UNSPECIFIED WHETHER LONG TERM INSULIN USE (HCC): Primary | ICD-10-CM

## 2020-01-15 DIAGNOSIS — M54.50 ACUTE RIGHT-SIDED LOW BACK PAIN WITHOUT SCIATICA: Primary | ICD-10-CM

## 2020-01-15 LAB
25(OH)D3 SERPL-MCNC: 38.8 NG/ML (ref 30–100)
ALBUMIN SERPL BCP-MCNC: 3.7 G/DL (ref 3.5–5)
ALP SERPL-CCNC: 53 U/L (ref 46–116)
ALT SERPL W P-5'-P-CCNC: 30 U/L (ref 12–78)
ANION GAP SERPL CALCULATED.3IONS-SCNC: 1 MMOL/L (ref 4–13)
AST SERPL W P-5'-P-CCNC: 17 U/L (ref 5–45)
BASOPHILS # BLD AUTO: 0.09 THOUSANDS/ΜL (ref 0–0.1)
BASOPHILS NFR BLD AUTO: 1 % (ref 0–1)
BILIRUB SERPL-MCNC: 0.95 MG/DL (ref 0.2–1)
BUN SERPL-MCNC: 15 MG/DL (ref 5–25)
CALCIUM SERPL-MCNC: 9.3 MG/DL (ref 8.3–10.1)
CHLORIDE SERPL-SCNC: 105 MMOL/L (ref 100–108)
CO2 SERPL-SCNC: 31 MMOL/L (ref 21–32)
CREAT SERPL-MCNC: 0.86 MG/DL (ref 0.6–1.3)
EOSINOPHIL # BLD AUTO: 0.24 THOUSAND/ΜL (ref 0–0.61)
EOSINOPHIL NFR BLD AUTO: 4 % (ref 0–6)
ERYTHROCYTE [DISTWIDTH] IN BLOOD BY AUTOMATED COUNT: 12.1 % (ref 11.6–15.1)
EST. AVERAGE GLUCOSE BLD GHB EST-MCNC: 108 MG/DL
GFR SERPL CREATININE-BSD FRML MDRD: 84 ML/MIN/1.73SQ M
GLUCOSE P FAST SERPL-MCNC: 95 MG/DL (ref 65–99)
HBA1C MFR BLD: 5.4 % (ref 4.2–6.3)
HCT VFR BLD AUTO: 45.1 % (ref 36.5–49.3)
HGB BLD-MCNC: 15.1 G/DL (ref 12–17)
IMM GRANULOCYTES # BLD AUTO: 0.08 THOUSAND/UL (ref 0–0.2)
IMM GRANULOCYTES NFR BLD AUTO: 1 % (ref 0–2)
LDLC SERPL DIRECT ASSAY-MCNC: 126 MG/DL (ref 0–100)
LYMPHOCYTES # BLD AUTO: 1.83 THOUSANDS/ΜL (ref 0.6–4.47)
LYMPHOCYTES NFR BLD AUTO: 28 % (ref 14–44)
MCH RBC QN AUTO: 33.8 PG (ref 26.8–34.3)
MCHC RBC AUTO-ENTMCNC: 33.5 G/DL (ref 31.4–37.4)
MCV RBC AUTO: 101 FL (ref 82–98)
MONOCYTES # BLD AUTO: 0.62 THOUSAND/ΜL (ref 0.17–1.22)
MONOCYTES NFR BLD AUTO: 10 % (ref 4–12)
NEUTROPHILS # BLD AUTO: 3.64 THOUSANDS/ΜL (ref 1.85–7.62)
NEUTS SEG NFR BLD AUTO: 56 % (ref 43–75)
NRBC BLD AUTO-RTO: 0 /100 WBCS
PMV BLD AUTO: 11.2 FL (ref 8.9–12.7)
POTASSIUM SERPL-SCNC: 4.6 MMOL/L (ref 3.5–5.3)
PROT SERPL-MCNC: 7.2 G/DL (ref 6.4–8.2)
RBC # BLD AUTO: 4.47 MILLION/UL (ref 3.88–5.62)
SODIUM SERPL-SCNC: 137 MMOL/L (ref 136–145)
WBC # BLD AUTO: 6.5 THOUSAND/UL (ref 4.31–10.16)

## 2020-01-15 PROCEDURE — 83036 HEMOGLOBIN GLYCOSYLATED A1C: CPT

## 2020-01-15 PROCEDURE — 97110 THERAPEUTIC EXERCISES: CPT | Performed by: PHYSICAL THERAPIST

## 2020-01-15 PROCEDURE — 82306 VITAMIN D 25 HYDROXY: CPT

## 2020-01-15 PROCEDURE — 80053 COMPREHEN METABOLIC PANEL: CPT

## 2020-01-15 PROCEDURE — 85025 COMPLETE CBC W/AUTO DIFF WBC: CPT

## 2020-01-15 PROCEDURE — 83721 ASSAY OF BLOOD LIPOPROTEIN: CPT

## 2020-01-15 PROCEDURE — 36415 COLL VENOUS BLD VENIPUNCTURE: CPT

## 2020-01-15 NOTE — PROGRESS NOTES
Daily Note     Today's date: 1/15/2020  Patient name: Jasmin Palacio  : [birth year]  MRN: [id number]  Referring provider: Farzaneh Yousif DO  Dx:   Encounter Diagnosis     ICD-10-CM    1  Acute right-sided low back pain without sciatica M54 5                   Subjective: Feeling better      Objective: See treatment diary below      Assessment: Tolerated treatment well  Patient would benefit from continued PT      Plan: Continue per plan of care  Precautions: None      Manual  1/13 1/15                        LC 10 minutes x10 minutes                                                      Exercise Diary                           Prone quad stretch x5 hold 30 sec  x5 hold 30 sec  Supine piriformis stretch x5 hold 30 sec x5 reps hold 30 sec  1/2 kneeling hip flexor stretch x5 reps hold 30 sec  x5 reps hold 30 sec  PT with hip flexion nt nt           PT with abd crunch With yellow bar - 3x10 3x10           Bridging with hip flexion On physioball 3x10 3x10                        Prone alt   3x10 3x10                                                                                                                       Modalities

## 2020-01-20 ENCOUNTER — OFFICE VISIT (OUTPATIENT)
Dept: PHYSICAL THERAPY | Age: 80
End: 2020-01-20
Payer: MEDICARE

## 2020-01-20 DIAGNOSIS — M54.50 ACUTE RIGHT-SIDED LOW BACK PAIN WITHOUT SCIATICA: Primary | ICD-10-CM

## 2020-01-20 PROCEDURE — 97110 THERAPEUTIC EXERCISES: CPT | Performed by: PHYSICAL THERAPIST

## 2020-01-21 NOTE — PROGRESS NOTES
Daily Note     Today's date: 2020  Patient name: Keith Delacruz  :   MRN: [id number]  Referring provider: Jimmy Monroy DO  Dx:   Encounter Diagnosis     ICD-10-CM    1  Acute right-sided low back pain without sciatica M54 5                   Subjective: Steady improvement noted  Objective: See treatment diary below      Assessment: Tolerated treatment well  Patient would benefit from continued PT      Plan: Continue per plan of care  Precautions: None      Manual  1/13 1/15 1/20                       LC 10 minutes x10 minutes x10 minutes                                                     Exercise Diary                           Prone quad stretch x5 hold 30 sec  x5 hold 30 sec  x5          Supine piriformis stretch x5 hold 30 sec x5 reps hold 30 sec  x5          1/2 kneeling hip flexor stretch x5 reps hold 30 sec  x5 reps hold 30 sec  x5             Seated of physioball quad lumb stretch             Prone lumbar stretch over physioball                       PT with hip flexion nt nt nt          PT with abd crunch With yellow bar - 3x10 3x10 3x10          Bridging with hip flexion On physioball 3x10 3x10 3x10                       Prone alt   3x10 3x10 3x10                                                                                                                      Modalities

## 2020-01-22 ENCOUNTER — OFFICE VISIT (OUTPATIENT)
Dept: PHYSICAL THERAPY | Age: 80
End: 2020-01-22
Payer: MEDICARE

## 2020-01-22 DIAGNOSIS — M54.50 ACUTE RIGHT-SIDED LOW BACK PAIN WITHOUT SCIATICA: Primary | ICD-10-CM

## 2020-01-22 PROCEDURE — 97140 MANUAL THERAPY 1/> REGIONS: CPT | Performed by: PHYSICAL THERAPIST

## 2020-01-22 PROCEDURE — 97110 THERAPEUTIC EXERCISES: CPT | Performed by: PHYSICAL THERAPIST

## 2020-01-23 NOTE — PROGRESS NOTES
Daily Note     Today's date: 2020  Patient name: Kate Fish  :   MRN: [id number]  Referring provider: Janae Allen DO  Dx:   Encounter Diagnosis     ICD-10-CM    1  Acute right-sided low back pain without sciatica M54 5                   Subjective: Slow progress      Objective: See treatment diary below      Assessment: Tolerated treatment well  Patient would benefit from continued PT      Plan: Continue per plan of care  Precautions: None      Manual  1/13 1/15 1/20 1/22                      LC 10 minutes x10 minutes x10 minutes 10 minutes             PA mobs grade II/III to lower thoracic and upper lumbar region                                       Exercise Diary                           Prone quad stretch x5 hold 30 sec  x5 hold 30 sec  x5 x5         Supine piriformis stretch x5 hold 30 sec x5 reps hold 30 sec  x5 x5         1/2 kneeling hip flexor stretch x5 reps hold 30 sec  x5 reps hold 30 sec  x5 x5            Seated of physioball quad lumb stretch completed            Prone lumbar stretch over physioball completed                      PT with hip flexion nt nt nt nt         PT with abd crunch With yellow bar - 3x10 3x10 3x10 3x10         Bridging with hip flexion On physioball 3x10 3x10 3x10 3x10                      Prone alt   3x10 3x10 3x10 3x10                                                                                                                     Modalities

## 2020-01-29 ENCOUNTER — OFFICE VISIT (OUTPATIENT)
Dept: PHYSICAL THERAPY | Age: 80
End: 2020-01-29
Payer: MEDICARE

## 2020-01-29 DIAGNOSIS — M54.50 ACUTE RIGHT-SIDED LOW BACK PAIN WITHOUT SCIATICA: Primary | ICD-10-CM

## 2020-01-29 PROCEDURE — 97110 THERAPEUTIC EXERCISES: CPT | Performed by: PHYSICAL THERAPIST

## 2020-01-29 PROCEDURE — 97140 MANUAL THERAPY 1/> REGIONS: CPT | Performed by: PHYSICAL THERAPIST

## 2020-01-29 NOTE — PROGRESS NOTES
Daily Note     Today's date: 2020  Patient name: Jasmin Palacio  : [birth year]  MRN: [id number]  Referring provider: Farzaneh Yousif DO  Dx:   Encounter Diagnosis     ICD-10-CM    1  Acute right-sided low back pain without sciatica M54 5                   Subjective: Steady progress noted  Objective: See treatment diary below      Assessment: Tolerated treatment well  Patient would benefit from continued PT      Plan: Continue per plan of care  Precautions: None      Manual  1/13 1/15 1/20 1/22 1/29                     LC 10 minutes x10 minutes x10 minutes 10 minutes x10 minutes            PA mobs grade II/III to lower thoracic and upper lumbar region completed             Manual piriformis stretch - x5                         Exercise Diary                           Prone quad stretch x5 hold 30 sec  x5 hold 30 sec  x5 x5 x5        Supine piriformis stretch x5 hold 30 sec x5 reps hold 30 sec  x5 x5 x5        1/2 kneeling hip flexor stretch x5 reps hold 30 sec  x5 reps hold 30 sec  x5 x5 x5           Seated of physioball quad lumb stretch completed completed           Prone lumbar stretch over physioball completed completed                     PT with hip flexion nt nt nt nt nt        PT with abd crunch With yellow bar - 3x10 3x10 3x10 3x10 3x10        Bridging with hip flexion On physioball 3x10 3x10 3x10 3x10 3x10                     Prone alt   3x10 3x10 3x10 3x10 3x10                                                                                                                    Modalities

## 2020-02-05 ENCOUNTER — OFFICE VISIT (OUTPATIENT)
Dept: PHYSICAL THERAPY | Age: 80
End: 2020-02-05
Payer: MEDICARE

## 2020-02-05 DIAGNOSIS — M54.50 ACUTE RIGHT-SIDED LOW BACK PAIN WITHOUT SCIATICA: Primary | ICD-10-CM

## 2020-02-05 PROCEDURE — 97140 MANUAL THERAPY 1/> REGIONS: CPT | Performed by: PHYSICAL THERAPIST

## 2020-02-05 PROCEDURE — 97110 THERAPEUTIC EXERCISES: CPT | Performed by: PHYSICAL THERAPIST

## 2020-02-05 NOTE — PROGRESS NOTES
Daily Note     Today's date: 2020  Patient name: Mart Hagan  :   MRN: [id number]  Referring provider: Nazia Oliveros DO  Dx:   Encounter Diagnosis     ICD-10-CM    1  Acute right-sided low back pain without sciatica M54 5                   Subjective: Feeling good - functional activities that previously       Objective: See treatment diary below      Assessment: Tolerated treatment well  Patient would benefit from continued PT      Plan: Continue per plan of care  Precautions: None      Manual  1/13 1/15 1/20 1/22 1/29 2                    LC 10 minutes x10 minutes x10 minutes 10 minutes x10 minutes x10 minutes           PA mobs grade II/III to lower thoracic and upper lumbar region completed completed            Manual piriformis stretch - x5 x5                        Exercise Diary                           Prone quad stretch x5 hold 30 sec  x5 hold 30 sec  x5 x5 x5 x5       Supine piriformis stretch x5 hold 30 sec x5 reps hold 30 sec  x5 x5 x5 x5       1/2 kneeling hip flexor stretch x5 reps hold 30 sec  x5 reps hold 30 sec  x5 x5 x5 x5          Seated of physioball quad lumb stretch completed completed completed          Prone lumbar stretch over physioball completed completed completed                    PT with hip flexion nt nt nt nt nt nt       PT with abd crunch With yellow bar - 3x10 3x10 3x10 3x10 3x10 3x10       Bridging with hip flexion On physioball 3x10 3x10 3x10 3x10 3x10 3x10                    Prone alt   3x10 3x10 3x10 3x10 3x10 3x10                                                                                                                   Modalities

## 2020-02-13 ENCOUNTER — OFFICE VISIT (OUTPATIENT)
Dept: PHYSICAL THERAPY | Age: 80
End: 2020-02-13
Payer: MEDICARE

## 2020-02-13 DIAGNOSIS — M54.50 ACUTE RIGHT-SIDED LOW BACK PAIN WITHOUT SCIATICA: Primary | ICD-10-CM

## 2020-02-13 PROCEDURE — 97110 THERAPEUTIC EXERCISES: CPT | Performed by: PHYSICAL THERAPIST

## 2020-02-13 PROCEDURE — 97140 MANUAL THERAPY 1/> REGIONS: CPT | Performed by: PHYSICAL THERAPIST

## 2020-02-20 ENCOUNTER — OFFICE VISIT (OUTPATIENT)
Dept: PHYSICAL THERAPY | Age: 80
End: 2020-02-20
Payer: MEDICARE

## 2020-02-20 DIAGNOSIS — M54.50 ACUTE RIGHT-SIDED LOW BACK PAIN WITHOUT SCIATICA: Primary | ICD-10-CM

## 2020-02-20 PROCEDURE — 97140 MANUAL THERAPY 1/> REGIONS: CPT | Performed by: PHYSICAL THERAPIST

## 2020-02-20 PROCEDURE — 97110 THERAPEUTIC EXERCISES: CPT | Performed by: PHYSICAL THERAPIST

## 2020-02-20 NOTE — PROGRESS NOTES
Daily Note     Today's date: 2020  Patient name: Jasmin Palacio  : [birth year]  MRN: [id number]  Referring provider: Farzaneh Yousif DO  Dx:   Encounter Diagnosis     ICD-10-CM    1  Acute right-sided low back pain without sciatica M54 5                   Subjective: No complaints      Objective: See treatment diary below      Assessment: Tolerated treatment well  Patient would benefit from continued PT      Plan: Continue per plan of care  Precautions: None      Manual  1/13 1/15 1/20 1/22 1/29 2/5 2/13 2/20                  LC 10 minutes x10 minutes x10 minutes 10 minutes x10 minutes x10 minutes x10 minutes x10 minutes         PA mobs grade II/III to lower thoracic and upper lumbar region completed completed completed completed          Manual piriformis stretch - x5 x5 x5 x5 - hamstring                      Exercise Diary                           Prone quad stretch x5 hold 30 sec  x5 hold 30 sec  x5 x5 x5 x5 x5 x5     Supine piriformis stretch x5 hold 30 sec x5 reps hold 30 sec  x5 x5 x5 x5 x5 x5     1/2 kneeling hip flexor stretch x5 reps hold 30 sec  x5 reps hold 30 sec  x5 x5 x5 x5 x5 x5        Seated of physioball quad lumb stretch completed completed completed completed completed        Prone lumbar stretch over physioball completed completed completed completed completed                  PT with hip flexion nt nt nt nt nt nt nt nt     PT with abd crunch With yellow bar - 3x10 3x10 3x10 3x10 3x10 3x10 3x10 3x10     Bridging with hip flexion On physioball 3x10 3x10 3x10 3x10 3x10 3x10 3x10 3x10                  Prone alt   3x10 3x10 3x10 3x10 3x10 3x10 3x10 3x10                                                                                                                 Modalities

## 2020-08-21 ENCOUNTER — APPOINTMENT (OUTPATIENT)
Dept: LAB | Age: 80
End: 2020-08-21
Payer: MEDICARE

## 2020-08-21 ENCOUNTER — TRANSCRIBE ORDERS (OUTPATIENT)
Dept: ADMINISTRATIVE | Age: 80
End: 2020-08-21

## 2020-08-21 DIAGNOSIS — I10 ESSENTIAL HYPERTENSION, MALIGNANT: ICD-10-CM

## 2020-08-21 DIAGNOSIS — D52.9 ANEMIA DUE TO FOLIC ACID DEFICIENCY, UNSPECIFIED DEFICIENCY TYPE: ICD-10-CM

## 2020-08-21 DIAGNOSIS — N13.9 ACUTE UNILATERAL OBSTRUCTIVE UROPATHY: ICD-10-CM

## 2020-08-21 DIAGNOSIS — E78.2 MIXED HYPERLIPIDEMIA: ICD-10-CM

## 2020-08-21 DIAGNOSIS — E13.69 OTHER SPECIFIED DIABETES MELLITUS WITH OTHER SPECIFIED COMPLICATION, UNSPECIFIED WHETHER LONG TERM INSULIN USE (HCC): Primary | ICD-10-CM

## 2020-08-21 DIAGNOSIS — E13.69 OTHER SPECIFIED DIABETES MELLITUS WITH OTHER SPECIFIED COMPLICATION, UNSPECIFIED WHETHER LONG TERM INSULIN USE (HCC): ICD-10-CM

## 2020-08-21 LAB
ALBUMIN SERPL BCP-MCNC: 3.7 G/DL (ref 3.5–5)
ALP SERPL-CCNC: 37 U/L (ref 46–116)
ALT SERPL W P-5'-P-CCNC: 23 U/L (ref 12–78)
ANION GAP SERPL CALCULATED.3IONS-SCNC: 4 MMOL/L (ref 4–13)
AST SERPL W P-5'-P-CCNC: 14 U/L (ref 5–45)
BASOPHILS # BLD AUTO: 0.07 THOUSANDS/ΜL (ref 0–0.1)
BASOPHILS NFR BLD AUTO: 2 % (ref 0–1)
BILIRUB SERPL-MCNC: 1.14 MG/DL (ref 0.2–1)
BUN SERPL-MCNC: 18 MG/DL (ref 5–25)
CALCIUM SERPL-MCNC: 8.8 MG/DL (ref 8.3–10.1)
CHLORIDE SERPL-SCNC: 108 MMOL/L (ref 100–108)
CO2 SERPL-SCNC: 28 MMOL/L (ref 21–32)
CREAT SERPL-MCNC: 0.92 MG/DL (ref 0.6–1.3)
EOSINOPHIL # BLD AUTO: 0.33 THOUSAND/ΜL (ref 0–0.61)
EOSINOPHIL NFR BLD AUTO: 7 % (ref 0–6)
ERYTHROCYTE [DISTWIDTH] IN BLOOD BY AUTOMATED COUNT: 12.4 % (ref 11.6–15.1)
EST. AVERAGE GLUCOSE BLD GHB EST-MCNC: 103 MG/DL
GFR SERPL CREATININE-BSD FRML MDRD: 79 ML/MIN/1.73SQ M
GLUCOSE P FAST SERPL-MCNC: 101 MG/DL (ref 65–99)
HBA1C MFR BLD: 5.2 %
HCT VFR BLD AUTO: 43.8 % (ref 36.5–49.3)
HGB BLD-MCNC: 15.1 G/DL (ref 12–17)
IMM GRANULOCYTES # BLD AUTO: 0.03 THOUSAND/UL (ref 0–0.2)
IMM GRANULOCYTES NFR BLD AUTO: 1 % (ref 0–2)
LDLC SERPL DIRECT ASSAY-MCNC: 132 MG/DL (ref 0–100)
LYMPHOCYTES # BLD AUTO: 1.64 THOUSANDS/ΜL (ref 0.6–4.47)
LYMPHOCYTES NFR BLD AUTO: 34 % (ref 14–44)
MCH RBC QN AUTO: 34.7 PG (ref 26.8–34.3)
MCHC RBC AUTO-ENTMCNC: 34.5 G/DL (ref 31.4–37.4)
MCV RBC AUTO: 101 FL (ref 82–98)
MONOCYTES # BLD AUTO: 0.51 THOUSAND/ΜL (ref 0.17–1.22)
MONOCYTES NFR BLD AUTO: 11 % (ref 4–12)
NEUTROPHILS # BLD AUTO: 2.21 THOUSANDS/ΜL (ref 1.85–7.62)
NEUTS SEG NFR BLD AUTO: 45 % (ref 43–75)
NRBC BLD AUTO-RTO: 0 /100 WBCS
PMV BLD AUTO: 12.1 FL (ref 8.9–12.7)
POTASSIUM SERPL-SCNC: 4.4 MMOL/L (ref 3.5–5.3)
PROT SERPL-MCNC: 6.8 G/DL (ref 6.4–8.2)
PSA SERPL-MCNC: 0.7 NG/ML (ref 0–4)
RBC # BLD AUTO: 4.35 MILLION/UL (ref 3.88–5.62)
SODIUM SERPL-SCNC: 140 MMOL/L (ref 136–145)
WBC # BLD AUTO: 4.79 THOUSAND/UL (ref 4.31–10.16)

## 2020-08-21 PROCEDURE — G0103 PSA SCREENING: HCPCS | Performed by: INTERNAL MEDICINE

## 2020-08-21 PROCEDURE — 85025 COMPLETE CBC W/AUTO DIFF WBC: CPT

## 2020-08-21 PROCEDURE — 36415 COLL VENOUS BLD VENIPUNCTURE: CPT | Performed by: INTERNAL MEDICINE

## 2020-08-21 PROCEDURE — 83036 HEMOGLOBIN GLYCOSYLATED A1C: CPT

## 2020-08-21 PROCEDURE — 80053 COMPREHEN METABOLIC PANEL: CPT

## 2020-08-21 PROCEDURE — 83721 ASSAY OF BLOOD LIPOPROTEIN: CPT

## 2020-10-13 ENCOUNTER — APPOINTMENT (OUTPATIENT)
Dept: RADIOLOGY | Age: 80
End: 2020-10-13
Payer: MEDICARE

## 2020-10-13 ENCOUNTER — TRANSCRIBE ORDERS (OUTPATIENT)
Dept: ADMINISTRATIVE | Age: 80
End: 2020-10-13

## 2020-10-13 ENCOUNTER — EVALUATION (OUTPATIENT)
Dept: PHYSICAL THERAPY | Age: 80
End: 2020-10-13
Payer: MEDICARE

## 2020-10-13 DIAGNOSIS — R52 SCAR PAINFUL: ICD-10-CM

## 2020-10-13 DIAGNOSIS — R52 SCAR PAINFUL: Primary | ICD-10-CM

## 2020-10-13 DIAGNOSIS — L90.5 SCAR PAINFUL: ICD-10-CM

## 2020-10-13 DIAGNOSIS — L90.5 SCAR PAINFUL: Primary | ICD-10-CM

## 2020-10-13 DIAGNOSIS — M25.559 HIP PAIN: Primary | ICD-10-CM

## 2020-10-13 PROCEDURE — 73521 X-RAY EXAM HIPS BI 2 VIEWS: CPT

## 2020-10-13 PROCEDURE — 97161 PT EVAL LOW COMPLEX 20 MIN: CPT | Performed by: PHYSICAL THERAPIST

## 2020-10-15 ENCOUNTER — OFFICE VISIT (OUTPATIENT)
Dept: PHYSICAL THERAPY | Age: 80
End: 2020-10-15
Payer: MEDICARE

## 2020-10-15 DIAGNOSIS — M25.559 HIP PAIN: Primary | ICD-10-CM

## 2020-10-15 PROCEDURE — 97110 THERAPEUTIC EXERCISES: CPT | Performed by: PHYSICAL THERAPIST

## 2020-10-15 PROCEDURE — 97140 MANUAL THERAPY 1/> REGIONS: CPT | Performed by: PHYSICAL THERAPIST

## 2020-10-19 ENCOUNTER — OFFICE VISIT (OUTPATIENT)
Dept: PHYSICAL THERAPY | Age: 80
End: 2020-10-19
Payer: MEDICARE

## 2020-10-19 DIAGNOSIS — M25.559 HIP PAIN: Primary | ICD-10-CM

## 2020-10-19 PROCEDURE — 97140 MANUAL THERAPY 1/> REGIONS: CPT | Performed by: PHYSICAL THERAPIST

## 2020-10-19 PROCEDURE — 97110 THERAPEUTIC EXERCISES: CPT | Performed by: PHYSICAL THERAPIST

## 2020-10-21 ENCOUNTER — OFFICE VISIT (OUTPATIENT)
Dept: PHYSICAL THERAPY | Age: 80
End: 2020-10-21
Payer: MEDICARE

## 2020-10-21 DIAGNOSIS — M25.559 HIP PAIN: Primary | ICD-10-CM

## 2020-10-21 PROCEDURE — 97110 THERAPEUTIC EXERCISES: CPT | Performed by: PHYSICAL THERAPIST

## 2020-10-21 PROCEDURE — 97140 MANUAL THERAPY 1/> REGIONS: CPT | Performed by: PHYSICAL THERAPIST

## 2020-10-26 ENCOUNTER — OFFICE VISIT (OUTPATIENT)
Dept: PHYSICAL THERAPY | Age: 80
End: 2020-10-26
Payer: MEDICARE

## 2020-10-26 DIAGNOSIS — M25.559 HIP PAIN: Primary | ICD-10-CM

## 2020-10-26 PROCEDURE — 97110 THERAPEUTIC EXERCISES: CPT | Performed by: PHYSICAL THERAPIST

## 2020-10-26 PROCEDURE — 97140 MANUAL THERAPY 1/> REGIONS: CPT | Performed by: PHYSICAL THERAPIST

## 2020-10-29 ENCOUNTER — OFFICE VISIT (OUTPATIENT)
Dept: PHYSICAL THERAPY | Age: 80
End: 2020-10-29
Payer: MEDICARE

## 2020-10-29 DIAGNOSIS — M25.559 HIP PAIN: Primary | ICD-10-CM

## 2020-10-29 PROCEDURE — 97140 MANUAL THERAPY 1/> REGIONS: CPT | Performed by: PHYSICAL THERAPIST

## 2020-10-29 PROCEDURE — 97110 THERAPEUTIC EXERCISES: CPT | Performed by: PHYSICAL THERAPIST

## 2020-11-04 ENCOUNTER — OFFICE VISIT (OUTPATIENT)
Dept: PHYSICAL THERAPY | Age: 80
End: 2020-11-04
Payer: MEDICARE

## 2020-11-04 DIAGNOSIS — M25.559 HIP PAIN: Primary | ICD-10-CM

## 2020-11-04 PROCEDURE — 97110 THERAPEUTIC EXERCISES: CPT | Performed by: PHYSICAL THERAPIST

## 2020-11-04 PROCEDURE — 97140 MANUAL THERAPY 1/> REGIONS: CPT | Performed by: PHYSICAL THERAPIST

## 2020-11-06 ENCOUNTER — OFFICE VISIT (OUTPATIENT)
Dept: PHYSICAL THERAPY | Age: 80
End: 2020-11-06
Payer: MEDICARE

## 2020-11-06 DIAGNOSIS — M25.559 HIP PAIN: Primary | ICD-10-CM

## 2020-11-06 PROCEDURE — 97140 MANUAL THERAPY 1/> REGIONS: CPT | Performed by: PHYSICAL THERAPIST

## 2020-11-06 PROCEDURE — 97110 THERAPEUTIC EXERCISES: CPT | Performed by: PHYSICAL THERAPIST

## 2020-11-09 ENCOUNTER — OFFICE VISIT (OUTPATIENT)
Dept: PHYSICAL THERAPY | Age: 80
End: 2020-11-09
Payer: MEDICARE

## 2020-11-09 DIAGNOSIS — M25.559 HIP PAIN: Primary | ICD-10-CM

## 2020-11-10 ENCOUNTER — OFFICE VISIT (OUTPATIENT)
Dept: PHYSICAL THERAPY | Age: 80
End: 2020-11-10
Payer: MEDICARE

## 2020-11-10 DIAGNOSIS — M25.559 HIP PAIN: Primary | ICD-10-CM

## 2020-11-10 PROCEDURE — 97110 THERAPEUTIC EXERCISES: CPT | Performed by: PHYSICAL THERAPIST

## 2020-11-10 PROCEDURE — 97140 MANUAL THERAPY 1/> REGIONS: CPT | Performed by: PHYSICAL THERAPIST

## 2020-11-12 ENCOUNTER — OFFICE VISIT (OUTPATIENT)
Dept: PHYSICAL THERAPY | Age: 80
End: 2020-11-12
Payer: MEDICARE

## 2020-11-12 DIAGNOSIS — M25.559 HIP PAIN: Primary | ICD-10-CM

## 2020-11-12 PROCEDURE — 97110 THERAPEUTIC EXERCISES: CPT | Performed by: PHYSICAL THERAPIST

## 2020-11-12 PROCEDURE — 97140 MANUAL THERAPY 1/> REGIONS: CPT | Performed by: PHYSICAL THERAPIST

## 2021-01-16 ENCOUNTER — IMMUNIZATIONS (OUTPATIENT)
Dept: FAMILY MEDICINE CLINIC | Facility: HOSPITAL | Age: 81
End: 2021-01-16

## 2021-01-16 DIAGNOSIS — Z23 ENCOUNTER FOR IMMUNIZATION: Primary | ICD-10-CM

## 2021-01-16 PROCEDURE — 91300 SARS-COV-2 / COVID-19 MRNA VACCINE (PFIZER-BIONTECH) 30 MCG: CPT

## 2021-01-16 PROCEDURE — 0001A SARS-COV-2 / COVID-19 MRNA VACCINE (PFIZER-BIONTECH) 30 MCG: CPT

## 2021-02-06 ENCOUNTER — IMMUNIZATIONS (OUTPATIENT)
Dept: FAMILY MEDICINE CLINIC | Facility: HOSPITAL | Age: 81
End: 2021-02-06

## 2021-02-06 DIAGNOSIS — Z23 ENCOUNTER FOR IMMUNIZATION: Primary | ICD-10-CM

## 2021-02-06 PROCEDURE — 0002A SARS-COV-2 / COVID-19 MRNA VACCINE (PFIZER-BIONTECH) 30 MCG: CPT

## 2021-02-06 PROCEDURE — 91300 SARS-COV-2 / COVID-19 MRNA VACCINE (PFIZER-BIONTECH) 30 MCG: CPT

## 2021-02-22 ENCOUNTER — LAB (OUTPATIENT)
Dept: LAB | Facility: CLINIC | Age: 81
End: 2021-02-22
Payer: MEDICARE

## 2021-02-22 ENCOUNTER — TRANSCRIBE ORDERS (OUTPATIENT)
Dept: LAB | Facility: CLINIC | Age: 81
End: 2021-02-22

## 2021-02-22 DIAGNOSIS — E78.5 HYPERLIPIDEMIA, UNSPECIFIED HYPERLIPIDEMIA TYPE: ICD-10-CM

## 2021-02-22 DIAGNOSIS — E78.2 MIXED HYPERLIPIDEMIA: ICD-10-CM

## 2021-02-22 DIAGNOSIS — I10 ESSENTIAL HYPERTENSION, MALIGNANT: Primary | ICD-10-CM

## 2021-02-22 DIAGNOSIS — I10 ESSENTIAL HYPERTENSION, MALIGNANT: ICD-10-CM

## 2021-02-22 DIAGNOSIS — E11.9 DIABETES MELLITUS WITHOUT COMPLICATION (HCC): ICD-10-CM

## 2021-02-22 LAB
ALBUMIN SERPL BCP-MCNC: 3.7 G/DL (ref 3.5–5)
ALP SERPL-CCNC: 48 U/L (ref 46–116)
ALT SERPL W P-5'-P-CCNC: 27 U/L (ref 12–78)
ANION GAP SERPL CALCULATED.3IONS-SCNC: 8 MMOL/L (ref 4–13)
AST SERPL W P-5'-P-CCNC: 12 U/L (ref 5–45)
BILIRUB SERPL-MCNC: 1.03 MG/DL (ref 0.2–1)
BUN SERPL-MCNC: 16 MG/DL (ref 5–25)
CALCIUM SERPL-MCNC: 9 MG/DL (ref 8.3–10.1)
CHLORIDE SERPL-SCNC: 105 MMOL/L (ref 100–108)
CHOLEST SERPL-MCNC: 222 MG/DL (ref 50–200)
CO2 SERPL-SCNC: 27 MMOL/L (ref 21–32)
CREAT SERPL-MCNC: 0.88 MG/DL (ref 0.6–1.3)
EST. AVERAGE GLUCOSE BLD GHB EST-MCNC: 100 MG/DL
GFR SERPL CREATININE-BSD FRML MDRD: 82 ML/MIN/1.73SQ M
GLUCOSE P FAST SERPL-MCNC: 103 MG/DL (ref 65–99)
HBA1C MFR BLD: 5.1 %
HDLC SERPL-MCNC: 63 MG/DL
LDLC SERPL CALC-MCNC: 134 MG/DL (ref 0–100)
LDLC SERPL DIRECT ASSAY-MCNC: 135 MG/DL (ref 0–100)
NONHDLC SERPL-MCNC: 159 MG/DL
POTASSIUM SERPL-SCNC: 4.4 MMOL/L (ref 3.5–5.3)
PROT SERPL-MCNC: 7.2 G/DL (ref 6.4–8.2)
SODIUM SERPL-SCNC: 140 MMOL/L (ref 136–145)
TRIGL SERPL-MCNC: 124 MG/DL

## 2021-02-22 PROCEDURE — 80061 LIPID PANEL: CPT

## 2021-02-22 PROCEDURE — 83721 ASSAY OF BLOOD LIPOPROTEIN: CPT

## 2021-02-22 PROCEDURE — 80053 COMPREHEN METABOLIC PANEL: CPT

## 2021-02-22 PROCEDURE — 36415 COLL VENOUS BLD VENIPUNCTURE: CPT

## 2021-02-22 PROCEDURE — 83036 HEMOGLOBIN GLYCOSYLATED A1C: CPT

## 2021-05-19 ENCOUNTER — EVALUATION (OUTPATIENT)
Dept: PHYSICAL THERAPY | Age: 81
End: 2021-05-19
Payer: MEDICARE

## 2021-05-19 DIAGNOSIS — M79.662 PAIN OF LEFT CALF: Primary | ICD-10-CM

## 2021-05-19 PROCEDURE — 97110 THERAPEUTIC EXERCISES: CPT | Performed by: PHYSICAL THERAPIST

## 2021-05-19 PROCEDURE — 97161 PT EVAL LOW COMPLEX 20 MIN: CPT | Performed by: PHYSICAL THERAPIST

## 2021-05-19 NOTE — PROGRESS NOTES
PT Evaluation     Today's date: 2021  Patient name: Ana Mancera  :   MRN: [id number]  Referring provider: Emely Prado DO  Dx: No diagnosis found  Assessment  Assessment details: L proximal calf strain - presents with decreased AROM, PROM, weakness, and pain  Patient is an excellent candidate for PT intervention  Impairments: abnormal or restricted ROM, impaired physical strength, pain with function and weight-bearing intolerance  Understanding of Dx/Px/POC: excellent  Goals  Short Term goals - 4 weeks  1  Patient will be independent HEP  2   Patient will report a 50% decrease in pain complaints  3   Increase strength 1/2 grade  4   Increase ROM 5-10 degrees  Long Term goals - 8 weeks  1  Patient will report elimination of pain complaints  2   Patient will return to all work related activities without restriction  3   Patient will return to all recreational activities without restriction  4   ROM WFL  5   Strength 5/5  Plan  Planned therapy interventions: manual therapy, strengthening, stretching and therapeutic exercise  Frequency: 2x week  Duration in weeks: 4        Subjective Evaluation    History of Present Illness  Mechanism of injury: Patient reports injuring L superior calf approx 3 weeks ago - sx's started after working in the yard moving stone and mulch  Neg c/o weakness  Neg c/o radic sx's  Worst movement with loading leg moving from sit-to-stand after prolonged sitting  Overall, sx's have slightly improved      Quality of life: excellent    Pain  Current pain ratin  At best pain ratin  At worst pain ratin  Quality: pressure, tight and throbbing  Progression: improved    Patient Goals  Patient goals for therapy: decreased pain, increased motion, increased strength, independence with ADLs/IADLs and return to sport/leisure activities          Objective     Tenderness     Additional Tenderness Details  Middle of calf just distal to knee joint  Active Range of Motion   Left Ankle/Foot   Dorsiflexion (ke): 8 degrees   Plantar flexion: 40 degrees     Passive Range of Motion   Left Knee   Extension: -15 degrees     Strength/Myotome Testing     Left Ankle/Foot   Dorsiflexion: 4-  Plantar flexion: 2+  Inversion: 4-  Eversion: 4-    General Comments: Ankle/Foot Comments   No swelling noted               Precautions: None      Manuals 5/19            LC  x10 minutes                                                   Neuro Re-Ed                                                                                                        Ther Ex             Gastroc stretch off step x5            Soleus stretch standing x5            Long sitting hamstring stretch x5            SLR flexion 2 x10                                                                Ther Activity                                       Gait Training                                       Modalities

## 2021-05-25 ENCOUNTER — OFFICE VISIT (OUTPATIENT)
Dept: PHYSICAL THERAPY | Age: 81
End: 2021-05-25
Payer: MEDICARE

## 2021-05-25 DIAGNOSIS — M79.662 PAIN OF LEFT CALF: Primary | ICD-10-CM

## 2021-05-25 PROCEDURE — 97110 THERAPEUTIC EXERCISES: CPT | Performed by: PHYSICAL THERAPIST

## 2021-05-25 NOTE — PROGRESS NOTES
Daily Note     Today's date: 2021  Patient name: Priscila Hdz  :   MRN: [id number]  Referring provider: Brady Telles DO  Dx:   Encounter Diagnosis     ICD-10-CM    1  Pain of left calf  M79 662                   Subjective: Feeling better      Objective: See treatment diary below  Knee extension still lacking  Assessment: Tolerated treatment well  Patient would benefit from continued PT      Plan: Continue per plan of care        Precautions: None      Manuals            LC  x10 minutes x10 minutes                                                  Neuro Re-Ed                                                                                                        Ther Ex             Gastroc stretch off step x5 x5           Soleus stretch standing x5 x5           Long sitting hamstring stretch x5 x5           SLR flexion 2 x10 2x15                        Cybex single leg press  50# - 3x10           Cybex double heel raise  50# - 3x10                        1/2 roll DF/PF  3x10                                                                                         Ther Activity                                       Gait Training                                       Modalities

## 2021-05-27 ENCOUNTER — OFFICE VISIT (OUTPATIENT)
Dept: PHYSICAL THERAPY | Age: 81
End: 2021-05-27
Payer: MEDICARE

## 2021-05-27 DIAGNOSIS — M79.662 PAIN OF LEFT CALF: Primary | ICD-10-CM

## 2021-05-27 PROCEDURE — 97110 THERAPEUTIC EXERCISES: CPT | Performed by: PHYSICAL THERAPIST

## 2021-05-27 NOTE — PROGRESS NOTES
Daily Note     Today's date: 2021  Patient name: Anusha Benítez  : [birth year]  MRN: [id number]  Referring provider: Diallo Mann DO  Dx:   Encounter Diagnosis     ICD-10-CM    1  Pain of left calf  M79 662                   Subjective: Patient progressing      Objective: See treatment diary below      Assessment: Tolerated treatment well  Patient would benefit from continued PT      Plan: Continue per plan of care        Precautions: None      Manuals           LC  x10 minutes x10 minutes x10 minutes                                                 Neuro Re-Ed                                                                                                        Ther Ex             Gastroc stretch off step x5 x5 x5          Soleus stretch standing x5 x5 x5          Long sitting hamstring stretch x5 x5 x5          SLR flexion 2 x10 2x15 2x15                       Cybex single leg press  50# - 3x10 50#          Cybex double heel raise  50# - 3x10 50#                       1/2 roll DF/PF  3x10 3x10                                                                                        Ther Activity                                       Gait Training                                       Modalities

## 2021-06-01 ENCOUNTER — OFFICE VISIT (OUTPATIENT)
Dept: PHYSICAL THERAPY | Age: 81
End: 2021-06-01
Payer: MEDICARE

## 2021-06-01 DIAGNOSIS — M79.662 PAIN OF LEFT CALF: Primary | ICD-10-CM

## 2021-06-01 PROCEDURE — 97110 THERAPEUTIC EXERCISES: CPT | Performed by: PHYSICAL THERAPIST

## 2021-06-01 NOTE — PROGRESS NOTES
Daily Note     Today's date: 2021  Patient name: Akira Hair  : [birth year]  MRN: [id number]  Referring provider: Bailey Fu DO  Dx:   Encounter Diagnosis     ICD-10-CM    1  Pain of left calf  M79 662                   Subjective: Patient reports walking around the mall this weekend - no difficulty with the calf noted      Objective: See treatment diary below      Assessment: Tolerated treatment well  Patient would benefit from continued PT      Plan: Continue per plan of care        Precautions: None      Manuals          LC  x10 minutes x10 minutes x10 minutes x10 minutes                                                Neuro Re-Ed                                                                                                        Ther Ex             Gastroc stretch off step x5 x5 x5 x5         Soleus stretch standing x5 x5 x5 x5         Long sitting hamstring stretch x5 x5 x5 x5         SLR flexion 2 x10 2x15 2x15 2x15 - 2 5#         TKE    5# - 3x10         Cybex single leg press  50# - 3x10 50# 50#         Cybex double heel raise  50# - 3x10 50# 50#                      1/2 roll DF/PF  3x10 3x10 3x10                                                                                       Ther Activity                                       Gait Training                                       Modalities

## 2021-06-03 ENCOUNTER — OFFICE VISIT (OUTPATIENT)
Dept: PHYSICAL THERAPY | Age: 81
End: 2021-06-03
Payer: MEDICARE

## 2021-06-03 DIAGNOSIS — M79.662 PAIN OF LEFT CALF: Primary | ICD-10-CM

## 2021-06-03 PROCEDURE — 97110 THERAPEUTIC EXERCISES: CPT | Performed by: PHYSICAL THERAPIST

## 2021-06-03 NOTE — PROGRESS NOTES
Daily Note     Today's date: 6/3/2021  Patient name: Lavonne Aquino  :   MRN: [id number]  Referring provider: Kyle Dubin, DO  Dx:   Encounter Diagnosis     ICD-10-CM    1  Pain of left calf  M79 662                   Subjective: Patient feeling good  Objective: See treatment diary below      Assessment: Tolerated treatment well  Patient would benefit from continued PT      Plan: Probable D/C to HEP next week       Precautions: None      Manuals 5/19 5/25 5/27 6/1 6/3        LC  x10 minutes x10 minutes x10 minutes x10 minutes x10 minutes                                               Neuro Re-Ed                                                                                                        Ther Ex             Gastroc stretch off step x5 x5 x5 x5 x5        Soleus stretch standing x5 x5 x5 x5 x5        Long sitting hamstring stretch x5 x5 x5 x5 x5        SLR flexion 2 x10 2x15 2x15 2x15 - 2 5# 2 5#        TKE    5# - 3x10 5#        Cybex single leg press  50# - 3x10 50# 50# 70#        Cybex double heel raise  50# - 3x10 50# 50# 70#                     1/2 roll DF/PF  3x10 3x10 3x10 3x10                                                                                      Ther Activity                                       Gait Training                                       Modalities

## 2021-06-07 ENCOUNTER — OFFICE VISIT (OUTPATIENT)
Dept: PHYSICAL THERAPY | Age: 81
End: 2021-06-07
Payer: MEDICARE

## 2021-06-07 DIAGNOSIS — M79.662 PAIN OF LEFT CALF: Primary | ICD-10-CM

## 2021-06-07 PROCEDURE — 97110 THERAPEUTIC EXERCISES: CPT | Performed by: PHYSICAL THERAPIST

## 2021-06-07 NOTE — PROGRESS NOTES
Daily Note     Today's date: 2021  Patient name: Omar Yen  :   MRN: [id number]  Referring provider: Saravanan Carrera DO  Dx:   Encounter Diagnosis     ICD-10-CM    1  Pain of left calf  M79 662                   Subjective: Some increased ant/lat shin pain noted  Objective: See treatment diary below      Assessment: Tolerated treatment well  Patient would benefit from continued PT      Plan: Continue per plan of care        Precautions: None      Manuals 5/19 5/25 5/27 6/1 6/3 6/7       LC  x10 minutes x10 minutes x10 minutes x10 minutes x10 minutes x10 minutes                                              Neuro Re-Ed                                                                                                        Ther Ex             Gastroc stretch off step x5 x5 x5 x5 x5 x5       Soleus stretch standing x5 x5 x5 x5 x5 x5       Long sitting hamstring stretch x5 x5 x5 x5 x5 x5       SLR flexion 2 x10 2x15 2x15 2x15 - 2 5# 2 5# 3#       TKE    5# - 3x10 5# 7 5#       Cybex single leg press  50# - 3x10 50# 50# 70# 3 sets       Cybex double heel raise  50# - 3x10 50# 50# 70# 3 sets                    1/2 roll DF/PF  3x10 3x10 3x10 3x10 3x12                                                                                     Ther Activity                                       Gait Training                                       Modalities

## 2021-06-09 ENCOUNTER — OFFICE VISIT (OUTPATIENT)
Dept: PHYSICAL THERAPY | Age: 81
End: 2021-06-09
Payer: MEDICARE

## 2021-06-09 DIAGNOSIS — M79.662 PAIN OF LEFT CALF: Primary | ICD-10-CM

## 2021-06-09 PROCEDURE — 97110 THERAPEUTIC EXERCISES: CPT | Performed by: PHYSICAL THERAPIST

## 2021-06-09 NOTE — PROGRESS NOTES
Daily Note     Today's date: 2021  Patient name: Rao Miller  :   MRN: [id number]  Referring provider: Diallo Mann DO  Dx:   Encounter Diagnosis     ICD-10-CM    1  Pain of left calf  M79 662                   Subjective: Hamstring soreness noted after last treatment  Objective: See treatment diary below      Assessment: Tolerated treatment well  Patient would benefit from continued PT      Plan: Continue per plan of care        Precautions: None      Manuals 5/19 5/25 5/27 6/1 6/3 6/7 6/9      LC  x10 minutes x10 minutes x10 minutes x10 minutes x10 minutes x10 minutes x10 minutes                                             Neuro Re-Ed                                                                                                        Ther Ex             Gastroc stretch off step x5 x5 x5 x5 x5 x5 x5      Soleus stretch standing x5 x5 x5 x5 x5 x5 x5      Long sitting hamstring stretch x5 x5 x5 x5 x5 x5 x5      SLR flexion 2 x10 2x15 2x15 2x15 - 2 5# 2 5# 3# 3#      TKE    5# - 3x10 5# 7 5# 7 5#      Cybex single leg press  50# - 3x10 50# 50# 70# 3 sets 3 sets      Cybex double heel raise  50# - 3x10 50# 50# 70# 3 sets 3 sets                   1/2 roll DF/PF  3x10 3x10 3x10 3x10 3x12 3x12                   Cybex hamstring       50# - 3 sets                                                          Ther Activity                                       Gait Training                                       Modalities

## 2021-06-16 ENCOUNTER — OFFICE VISIT (OUTPATIENT)
Dept: PHYSICAL THERAPY | Age: 81
End: 2021-06-16
Payer: MEDICARE

## 2021-06-16 DIAGNOSIS — M79.662 PAIN OF LEFT CALF: Primary | ICD-10-CM

## 2021-06-16 PROCEDURE — 97110 THERAPEUTIC EXERCISES: CPT | Performed by: PHYSICAL THERAPIST

## 2021-06-16 NOTE — PROGRESS NOTES
Daily Note     Today's date: 2021  Patient name: Lavonne Aquino  :   MRN: [id number]  Referring provider: Kyle Dubin, DO  Dx:   Encounter Diagnosis     ICD-10-CM    1  Pain of left calf  M79 662                   Subjective: Patient feeling good  Objective: See treatment diary below      Assessment: Tolerated treatment well  Patient would benefit from continued PT      Plan: Probable D/C to [facility] on Friday       Precautions: None      Manuals  6/3 6     LC  x10 minutes x10 minutes x10 minutes x10 minutes x10 minutes x10 minutes x10 minutes x10 minutes                                            Neuro Re-Ed                                                                                                        Ther Ex             Gastroc stretch off step x5 x5 x5 x5 x5 x5 x5 x5     Soleus stretch standing x5 x5 x5 x5 x5 x5 x5 x5     Long sitting hamstring stretch x5 x5 x5 x5 x5 x5 x5 x5     SLR flexion 2 x10 2x15 2x15 2x15 - 2 5# 2 5# 3# 3# 3#     TKE    5# - 3x10 5# 7 5# 7 5# 7 5#     Cybex single leg press  50# - 3x10 50# 50# 70# 3 sets 3 sets 3 sets     Cybex double heel raise  50# - 3x10 50# 50# 70# 3 sets 3 sets 3 sets                  1/2 roll DF/PF  3x10 3x10 3x10 3x10 3x12 3x12 3x12                  Cybex hamstring       50# - 3 sets 50# - 3 sets                                                         Ther Activity                                       Gait Training                                       Modalities

## 2021-06-18 ENCOUNTER — OFFICE VISIT (OUTPATIENT)
Dept: PHYSICAL THERAPY | Age: 81
End: 2021-06-18
Payer: MEDICARE

## 2021-06-18 DIAGNOSIS — M79.662 PAIN OF LEFT CALF: Primary | ICD-10-CM

## 2021-06-18 PROCEDURE — 97110 THERAPEUTIC EXERCISES: CPT | Performed by: PHYSICAL THERAPIST

## 2021-06-18 NOTE — PROGRESS NOTES
Daily Note     Today's date: 2021  Patient name: Shikha Mcelroy  :   MRN: [id number]  Referring provider: Tatiana Myles DO  Dx:   Encounter Diagnosis     ICD-10-CM    1  Pain of left calf  M79 662                   Subjective: Patient feeling good  Objective: See treatment diary below      Assessment: Tolerated treatment well  Patient would benefit from continued PT      Plan: D/C to [facility] - goals achieved       Precautions: None      Manuals  6/3 6 6    LC  x10 minutes x10 minutes x10 minutes x10 minutes x10 minutes x10 minutes x10 minutes x10 minutes x10 minutes                                           Neuro Re-Ed                                                                                                        Ther Ex             Gastroc stretch off step x5 x5 x5 x5 x5 x5 x5 x5 x5    Soleus stretch standing x5 x5 x5 x5 x5 x5 x5 x5 x5    Long sitting hamstring stretch x5 x5 x5 x5 x5 x5 x5 x5 x5    SLR flexion 2 x10 2x15 2x15 2x15 - 2 5# 2 5# 3# 3# 3# 3#    TKE    5# - 3x10 5# 7 5# 7 5# 7 5# 7 5#    Cybex single leg press  50# - 3x10 50# 50# 70# 3 sets 3 sets 3 sets 3 sets    Cybex double heel raise  50# - 3x10 50# 50# 70# 3 sets 3 sets 3 sets 3 sets                 1/2 roll DF/PF  3x10 3x10 3x10 3x10 3x12 3x12 3x12 3x12                 Cybex hamstring       50# - 3 sets 50# - 3 sets 50# - 3 sets                                                        Ther Activity                                       Gait Training                                       Modalities

## 2021-09-03 ENCOUNTER — LAB REQUISITION (OUTPATIENT)
Dept: LAB | Facility: HOSPITAL | Age: 81
End: 2021-09-03
Payer: MEDICARE

## 2021-09-03 DIAGNOSIS — K63.5 POLYP OF COLON: ICD-10-CM

## 2021-09-03 DIAGNOSIS — Z86.010 PERSONAL HISTORY OF COLONIC POLYPS: ICD-10-CM

## 2021-09-03 DIAGNOSIS — K57.30 DIVERTICULOSIS OF LARGE INTESTINE WITHOUT PERFORATION OR ABSCESS WITHOUT BLEEDING: ICD-10-CM

## 2021-09-03 PROCEDURE — 88305 TISSUE EXAM BY PATHOLOGIST: CPT | Performed by: PATHOLOGY

## 2021-09-07 ENCOUNTER — TELEPHONE (OUTPATIENT)
Dept: INTERNAL MEDICINE CLINIC | Facility: CLINIC | Age: 81
End: 2021-09-07

## 2021-09-07 DIAGNOSIS — I10 ESSENTIAL HYPERTENSION: Primary | ICD-10-CM

## 2021-09-07 DIAGNOSIS — D64.9 ANEMIA, UNSPECIFIED TYPE: ICD-10-CM

## 2021-09-07 DIAGNOSIS — Z13.6 SCREENING FOR CARDIOVASCULAR CONDITION: ICD-10-CM

## 2021-09-07 DIAGNOSIS — Z12.5 SCREENING FOR PROSTATE CANCER: ICD-10-CM

## 2021-09-07 RX ORDER — LISINOPRIL 10 MG/1
10 TABLET ORAL DAILY
Qty: 90 TABLET | Refills: 1 | Status: SHIPPED | OUTPATIENT
Start: 2021-09-07 | End: 2022-01-19 | Stop reason: SDUPTHER

## 2021-09-07 NOTE — TELEPHONE ENCOUNTER
Orders placed for all those labs except TSH, it could not be linked to a diagnosis and since I dont have the patient's full diagnoses, I cant link it to an approved diagnosis

## 2021-09-07 NOTE — TELEPHONE ENCOUNTER
Patient is requesting new lab orders to be placed in the [facility] system  Patient has old orders that were placed from dr CRISTOPHER Flores office  CBC w autho differential, tsh free t4, lipid, cmp, psa, and urinalysis  Please add orders, I can notify patient once in chart

## 2021-09-07 NOTE — TELEPHONE ENCOUNTER
TSH+Free T4 diagnosis from old system lab order was D64 9, I10, E78 2, R35 0, E03 9, and N42 89    Not sure if this information can be of any help

## 2021-09-13 ENCOUNTER — APPOINTMENT (OUTPATIENT)
Dept: LAB | Age: 81
End: 2021-09-13
Payer: MEDICARE

## 2021-09-13 DIAGNOSIS — Z13.6 SCREENING FOR CARDIOVASCULAR CONDITION: ICD-10-CM

## 2021-09-13 DIAGNOSIS — D64.9 ANEMIA, UNSPECIFIED TYPE: ICD-10-CM

## 2021-09-13 DIAGNOSIS — Z12.5 SCREENING FOR PROSTATE CANCER: ICD-10-CM

## 2021-09-13 DIAGNOSIS — I10 ESSENTIAL HYPERTENSION: ICD-10-CM

## 2021-09-13 LAB
ALBUMIN SERPL BCP-MCNC: 3.4 G/DL (ref 3.5–5)
ALP SERPL-CCNC: 48 U/L (ref 46–116)
ALT SERPL W P-5'-P-CCNC: 23 U/L (ref 12–78)
ANION GAP SERPL CALCULATED.3IONS-SCNC: 4 MMOL/L (ref 4–13)
AST SERPL W P-5'-P-CCNC: 18 U/L (ref 5–45)
BASOPHILS # BLD AUTO: 0.07 THOUSANDS/ΜL (ref 0–0.1)
BASOPHILS NFR BLD AUTO: 1 % (ref 0–1)
BILIRUB SERPL-MCNC: 0.9 MG/DL (ref 0.2–1)
BILIRUB UR QL STRIP: NEGATIVE
BUN SERPL-MCNC: 14 MG/DL (ref 5–25)
CALCIUM ALBUM COR SERPL-MCNC: 8.9 MG/DL (ref 8.3–10.1)
CALCIUM SERPL-MCNC: 8.4 MG/DL (ref 8.3–10.1)
CHLORIDE SERPL-SCNC: 106 MMOL/L (ref 100–108)
CHOLEST SERPL-MCNC: 188 MG/DL (ref 50–200)
CLARITY UR: CLEAR
CO2 SERPL-SCNC: 27 MMOL/L (ref 21–32)
COLOR UR: YELLOW
CREAT SERPL-MCNC: 0.86 MG/DL (ref 0.6–1.3)
EOSINOPHIL # BLD AUTO: 0.25 THOUSAND/ΜL (ref 0–0.61)
EOSINOPHIL NFR BLD AUTO: 4 % (ref 0–6)
ERYTHROCYTE [DISTWIDTH] IN BLOOD BY AUTOMATED COUNT: 12.8 % (ref 11.6–15.1)
GFR SERPL CREATININE-BSD FRML MDRD: 82 ML/MIN/1.73SQ M
GLUCOSE P FAST SERPL-MCNC: 95 MG/DL (ref 65–99)
GLUCOSE UR STRIP-MCNC: NEGATIVE MG/DL
HCT VFR BLD AUTO: 42.6 % (ref 36.5–49.3)
HDLC SERPL-MCNC: 57 MG/DL
HGB BLD-MCNC: 14.4 G/DL (ref 12–17)
HGB UR QL STRIP.AUTO: NEGATIVE
IMM GRANULOCYTES # BLD AUTO: 0.02 THOUSAND/UL (ref 0–0.2)
IMM GRANULOCYTES NFR BLD AUTO: 0 % (ref 0–2)
KETONES UR STRIP-MCNC: NEGATIVE MG/DL
LDLC SERPL CALC-MCNC: 118 MG/DL (ref 0–100)
LEUKOCYTE ESTERASE UR QL STRIP: NEGATIVE
LYMPHOCYTES # BLD AUTO: 1.39 THOUSANDS/ΜL (ref 0.6–4.47)
LYMPHOCYTES NFR BLD AUTO: 24 % (ref 14–44)
MCH RBC QN AUTO: 33.6 PG (ref 26.8–34.3)
MCHC RBC AUTO-ENTMCNC: 33.8 G/DL (ref 31.4–37.4)
MCV RBC AUTO: 99 FL (ref 82–98)
MONOCYTES # BLD AUTO: 0.58 THOUSAND/ΜL (ref 0.17–1.22)
MONOCYTES NFR BLD AUTO: 10 % (ref 4–12)
NEUTROPHILS # BLD AUTO: 3.6 THOUSANDS/ΜL (ref 1.85–7.62)
NEUTS SEG NFR BLD AUTO: 61 % (ref 43–75)
NITRITE UR QL STRIP: NEGATIVE
NRBC BLD AUTO-RTO: 0 /100 WBCS
PH UR STRIP.AUTO: 7 [PH]
PMV BLD AUTO: 13.2 FL (ref 8.9–12.7)
POTASSIUM SERPL-SCNC: 4.5 MMOL/L (ref 3.5–5.3)
PROT SERPL-MCNC: 6.9 G/DL (ref 6.4–8.2)
PROT UR STRIP-MCNC: NEGATIVE MG/DL
PSA SERPL-MCNC: 0.8 NG/ML (ref 0–4)
RBC # BLD AUTO: 4.29 MILLION/UL (ref 3.88–5.62)
SODIUM SERPL-SCNC: 137 MMOL/L (ref 136–145)
SP GR UR STRIP.AUTO: 1.01 (ref 1–1.03)
TRIGL SERPL-MCNC: 63 MG/DL
TSH SERPL DL<=0.05 MIU/L-ACNC: 1.29 UIU/ML (ref 0.36–3.74)
UROBILINOGEN UR QL STRIP.AUTO: 1 E.U./DL
WBC # BLD AUTO: 5.91 THOUSAND/UL (ref 4.31–10.16)

## 2021-09-13 PROCEDURE — 84443 ASSAY THYROID STIM HORMONE: CPT

## 2021-09-13 PROCEDURE — 36415 COLL VENOUS BLD VENIPUNCTURE: CPT

## 2021-09-13 PROCEDURE — 80061 LIPID PANEL: CPT

## 2021-09-13 PROCEDURE — G0103 PSA SCREENING: HCPCS

## 2021-09-13 PROCEDURE — 85025 COMPLETE CBC W/AUTO DIFF WBC: CPT

## 2021-09-13 PROCEDURE — 81003 URINALYSIS AUTO W/O SCOPE: CPT

## 2021-09-13 PROCEDURE — 80053 COMPREHEN METABOLIC PANEL: CPT

## 2021-09-20 ENCOUNTER — OFFICE VISIT (OUTPATIENT)
Dept: INTERNAL MEDICINE CLINIC | Facility: CLINIC | Age: 81
End: 2021-09-20
Payer: MEDICARE

## 2021-09-20 VITALS
WEIGHT: 238.6 LBS | BODY MASS INDEX: 30.62 KG/M2 | HEART RATE: 78 BPM | SYSTOLIC BLOOD PRESSURE: 132 MMHG | OXYGEN SATURATION: 96 % | DIASTOLIC BLOOD PRESSURE: 80 MMHG | TEMPERATURE: 96.8 F | HEIGHT: 74 IN

## 2021-09-20 DIAGNOSIS — M15.9 PRIMARY OSTEOARTHRITIS INVOLVING MULTIPLE JOINTS: ICD-10-CM

## 2021-09-20 DIAGNOSIS — E66.09 CLASS 1 OBESITY DUE TO EXCESS CALORIES WITHOUT SERIOUS COMORBIDITY WITH BODY MASS INDEX (BMI) OF 31.0 TO 31.9 IN ADULT: ICD-10-CM

## 2021-09-20 DIAGNOSIS — I10 ESSENTIAL HYPERTENSION: Primary | ICD-10-CM

## 2021-09-20 DIAGNOSIS — E78.2 MIXED HYPERLIPIDEMIA: ICD-10-CM

## 2021-09-20 PROBLEM — M15.0 PRIMARY OSTEOARTHRITIS INVOLVING MULTIPLE JOINTS: Status: ACTIVE | Noted: 2021-09-20

## 2021-09-20 PROBLEM — E66.811 CLASS 1 OBESITY DUE TO EXCESS CALORIES WITHOUT SERIOUS COMORBIDITY WITH BODY MASS INDEX (BMI) OF 31.0 TO 31.9 IN ADULT: Status: ACTIVE | Noted: 2021-09-20

## 2021-09-20 PROCEDURE — 99214 OFFICE O/P EST MOD 30 MIN: CPT | Performed by: NURSE PRACTITIONER

## 2021-09-20 NOTE — PROGRESS NOTES
Assessment/Plan:  BMI Counseling: Body mass index is 30 95 kg/m²  The BMI is above normal  Nutrition recommendations include decreasing portion sizes, encouraging healthy choices of fruits and vegetables, consuming healthier snacks, moderation in carbohydrate intake, increasing intake of lean protein and reducing intake of cholesterol  Exercise recommendations include vigorous physical activity 75 minutes/week, exercising 3-5 times per week and strength training exercises  No pharmacotherapy was ordered  Rationale for BMI follow-up plan is due to patient being overweight or obese  Depression Screening and Follow-up Plan:   Patient was screened for depression during today's encounter  They screened negative with a PHQ-2 score of 0  Essential hypertension  BP adequately controlled on current regimen  would recommend continued weight mgmt, avoiding high sodium foods  Continue to check BP periodically in the outpatient setting  Call if SBP remains >150      Mixed hyperlipidemia  Takes fish oil   stable    Class 1 obesity due to excess calories without serious comorbidity with body mass index (BMI) of 31 0 to 31 9 in adult  Recommend decrease in calories  Increase exercise    Primary osteoarthritis involving multiple joints  Takes aleve daily  Continue daily exercise         Problem List Items Addressed This Visit        Cardiovascular and Mediastinum    Essential hypertension - Primary     BP adequately controlled on current regimen  would recommend continued weight mgmt, avoiding high sodium foods  Continue to check BP periodically in the outpatient setting  Call if SBP remains >150           Relevant Orders    Comprehensive metabolic panel       Other    Mixed hyperlipidemia     Takes fish oil   stable         Relevant Orders    LDL cholesterol, direct            Subjective:      Patient ID: Jose Antonio Brennan is a 80 y o  male  Leeanne Escobedo is here today for follow up    Has no real complaints other than some generalized arthritis  He does take alleve daily in the a m  and states this is helpful for him to get through the day  HIs latest cmp is normal, he takes it with food  I did also recommend trying tylenol as needed for breakthrough pain  He is up to date with labs, colonoscopy and immunizations      The following portions of the patient's history were reviewed and updated as appropriate: allergies, current medications, past family history, past medical history, past social history, past surgical history and problem list     Review of Systems   Constitutional: Negative for chills and fever  HENT: Negative for ear pain and sore throat  Eyes: Negative for pain and visual disturbance  Respiratory: Negative for cough and shortness of breath  Cardiovascular: Negative for chest pain and palpitations  Gastrointestinal: Negative for abdominal pain and vomiting  Genitourinary: Negative for dysuria and hematuria  Musculoskeletal: Negative for arthralgias and back pain  Skin: Negative for color change and rash  Neurological: Negative for seizures and syncope  All other systems reviewed and are negative  Objective:      /80 (BP Location: Left arm, Patient Position: Sitting, Cuff Size: Large)   Pulse 78   Temp (!) 96 8 °F (36 °C) (Tympanic)   Ht 6' 1 62" (1 87 m)   Wt 108 kg (238 lb 9 6 oz)   SpO2 96% Comment: room air  BMI 30 95 kg/m²          Physical Exam  Vitals and nursing note reviewed  Constitutional:       Appearance: Normal appearance  HENT:      Head: Normocephalic  Nose: Nose normal       Mouth/Throat:      Mouth: Mucous membranes are dry  Eyes:      Extraocular Movements: Extraocular movements intact  Conjunctiva/sclera: Conjunctivae normal       Pupils: Pupils are equal, round, and reactive to light  Cardiovascular:      Rate and Rhythm: Normal rate and regular rhythm  Pulses: Normal pulses  Heart sounds: Normal heart sounds     Pulmonary: Effort: Pulmonary effort is normal       Breath sounds: Normal breath sounds  Abdominal:      General: Abdomen is flat  Bowel sounds are normal       Palpations: Abdomen is soft  Musculoskeletal:         General: Normal range of motion  Cervical back: Normal range of motion  Skin:     General: Skin is warm and dry  Neurological:      General: No focal deficit present  Mental Status: He is alert and oriented to person, place, and time     Psychiatric:         Mood and Affect: Mood normal

## 2021-09-20 NOTE — PATIENT INSTRUCTIONS
Osteoarthritis   AMBULATORY CARE:   Osteoarthritis  occurs when cartilage (tissue that cushions a joint) wears away slowly and causes the bones to rub together  Osteoarthritis (OA) is a long-term condition that often affects the hands, neck, lower back, knees, and hips  OA is also called arthrosis or degenerative joint disease  Common signs and symptoms include the following:   · Joint pain that gets worse when you move the joint     · Joint stiffness that decreases after you move the joint     · Decreased range of movement     · Hard, bony enlargement on your fingers or toes    · A grinding or cracking sound when you move your joint    Call your doctor or specialist if:   · You have severe pain  · You cannot move your joint  · You have a fever  · Your joint is red and tender  · You have questions or concerns about your condition or care  Treatment for osteoarthritis  may include any of the following:  · Acetaminophen  decreases pain and fever  It is available without a doctor's order  Ask how much to take and how often to take it  Follow directions  Read the labels of all other medicines you are using to see if they also contain acetaminophen, or ask your doctor or pharmacist  Acetaminophen can cause liver damage if not taken correctly  Do not use more than 4 grams (4,000 milligrams) total of acetaminophen in one day  · NSAIDs , such as ibuprofen, help decrease swelling, pain, and fever  This medicine is available with or without a doctor's order  NSAIDs can cause stomach bleeding or kidney problems in certain people  If you take blood thinner medicine, always ask your healthcare provider if NSAIDs are safe for you  Always read the medicine label and follow directions  · Capsaicin cream  may help decrease pain in your joint  · Prescription pain medicine  may be given  Ask your healthcare provider how to take this medicine safely  Some prescription pain medicines contain acetaminophen   Do not take other medicines that contain acetaminophen without talking to your healthcare provider  Too much acetaminophen may cause liver damage  Prescription pain medicine may cause constipation  Ask your healthcare provider how to prevent or treat constipation  · A steroid injection  may be given if your symptoms get worse  · Physical therapy  is used to teach you exercises to help improve movement and strength, and to decrease pain  A physical therapist may move an area with his or her hands  For example, he or she may move your leg in certain ways to treat osteoarthritis in your hip  · Ultrasound  may be used to treat osteoarthritis in certain areas, such as your knee  Ultrasound produces heat that can relieve pain  · Surgery  may be needed if other treatments do not work  Manage your symptoms:   · Stay active  Physical activity may reduce your pain and improve your ability to do daily activities  Avoid activities that cause pain  Ask your healthcare provider what type of exercise would be best for you  · Maintain a healthy weight  This helps decrease the strain on the joints in your back, hips, knees, ankles, and feet  Ask your healthcare provider what a healthy weight is for you  He or she can help you create a weight loss plan if you are overweight  · Use heat or ice on your joints as directed  Heat and ice help decrease pain, swelling, and muscle spasms  For heat, use a heating pad on a low setting for 20 minutes, or take a warm bath  For ice, use an ice pack, or put crushed ice in a plastic bag  Cover it with a towel before you place it on your joint  Use ice for 15 minutes every hour  · Massage the muscles around the joint  Massage helps relieve pain and stiffness  Your healthcare provider or a physical therapist can show you how to do this  If you have hip OA, another person may need to help you massage the area  · Use a cane, crutches, or a walker if directed    These help protect and relieve pressure on your ankle, knee, and hip joints  You may also be prescribed shoe inserts to decrease pressure in your joints  · Wear flat or low-heeled shoes  This will help decrease pain and reduce pressure on your ankle, knee, and hip joints  Follow up with your healthcare provider as directed:  Write down your questions so you remember to ask them during your visits  © Copyright [employer] 2021 Information is for End User's use only and may not be sold, redistributed or otherwise used for commercial purposes  All illustrations and images included in CareNotes® are the copyrighted property of [employer]  or [address] [place], [place] [address]giulia Jennings   The above information is an  only  It is not intended as medical advice for individual conditions or treatments  Talk to your doctor, nurse or pharmacist before following any medical regimen to see if it is safe and effective for you

## 2022-01-19 DIAGNOSIS — I10 ESSENTIAL HYPERTENSION: ICD-10-CM

## 2022-01-19 RX ORDER — LISINOPRIL 10 MG/1
10 TABLET ORAL DAILY
Qty: 90 TABLET | Refills: 1 | Status: SHIPPED | OUTPATIENT
Start: 2022-01-19

## 2022-03-15 ENCOUNTER — APPOINTMENT (OUTPATIENT)
Dept: LAB | Age: 82
End: 2022-03-15
Payer: MEDICARE

## 2022-03-15 DIAGNOSIS — I10 ESSENTIAL HYPERTENSION: ICD-10-CM

## 2022-03-15 DIAGNOSIS — E78.2 MIXED HYPERLIPIDEMIA: ICD-10-CM

## 2022-03-15 LAB
ALBUMIN SERPL BCP-MCNC: 3.8 G/DL (ref 3.5–5)
ALP SERPL-CCNC: 46 U/L (ref 46–116)
ALT SERPL W P-5'-P-CCNC: 22 U/L (ref 12–78)
ANION GAP SERPL CALCULATED.3IONS-SCNC: 3 MMOL/L (ref 4–13)
AST SERPL W P-5'-P-CCNC: 16 U/L (ref 5–45)
BILIRUB SERPL-MCNC: 0.98 MG/DL (ref 0.2–1)
BUN SERPL-MCNC: 17 MG/DL (ref 5–25)
CALCIUM SERPL-MCNC: 9.5 MG/DL (ref 8.3–10.1)
CHLORIDE SERPL-SCNC: 106 MMOL/L (ref 100–108)
CO2 SERPL-SCNC: 28 MMOL/L (ref 21–32)
CREAT SERPL-MCNC: 0.94 MG/DL (ref 0.6–1.3)
GFR SERPL CREATININE-BSD FRML MDRD: 76 ML/MIN/1.73SQ M
GLUCOSE P FAST SERPL-MCNC: 93 MG/DL (ref 65–99)
LDLC SERPL DIRECT ASSAY-MCNC: 124 MG/DL (ref 0–100)
POTASSIUM SERPL-SCNC: 4.7 MMOL/L (ref 3.5–5.3)
PROT SERPL-MCNC: 7 G/DL (ref 6.4–8.2)
SODIUM SERPL-SCNC: 137 MMOL/L (ref 136–145)

## 2022-03-15 PROCEDURE — 36415 COLL VENOUS BLD VENIPUNCTURE: CPT

## 2022-03-15 PROCEDURE — 83721 ASSAY OF BLOOD LIPOPROTEIN: CPT

## 2022-03-15 PROCEDURE — 80053 COMPREHEN METABOLIC PANEL: CPT

## 2022-03-22 ENCOUNTER — OFFICE VISIT (OUTPATIENT)
Dept: INTERNAL MEDICINE CLINIC | Facility: CLINIC | Age: 82
End: 2022-03-22
Payer: MEDICARE

## 2022-03-22 VITALS
DIASTOLIC BLOOD PRESSURE: 70 MMHG | SYSTOLIC BLOOD PRESSURE: 108 MMHG | WEIGHT: 233.4 LBS | TEMPERATURE: 96.4 F | HEART RATE: 82 BPM | HEIGHT: 74 IN | OXYGEN SATURATION: 98 % | BODY MASS INDEX: 29.95 KG/M2

## 2022-03-22 DIAGNOSIS — M15.9 PRIMARY OSTEOARTHRITIS INVOLVING MULTIPLE JOINTS: ICD-10-CM

## 2022-03-22 DIAGNOSIS — M25.512 BILATERAL SHOULDER PAIN, UNSPECIFIED CHRONICITY: ICD-10-CM

## 2022-03-22 DIAGNOSIS — E66.09 CLASS 1 OBESITY DUE TO EXCESS CALORIES WITHOUT SERIOUS COMORBIDITY WITH BODY MASS INDEX (BMI) OF 30.0 TO 30.9 IN ADULT: ICD-10-CM

## 2022-03-22 DIAGNOSIS — M25.511 ACUTE PAIN OF BOTH SHOULDERS: Primary | ICD-10-CM

## 2022-03-22 DIAGNOSIS — M25.512 ACUTE PAIN OF BOTH SHOULDERS: Primary | ICD-10-CM

## 2022-03-22 DIAGNOSIS — I10 ESSENTIAL HYPERTENSION: ICD-10-CM

## 2022-03-22 DIAGNOSIS — Z00.00 MEDICARE ANNUAL WELLNESS VISIT, SUBSEQUENT: ICD-10-CM

## 2022-03-22 DIAGNOSIS — Z79.899 HIGH RISK MEDICATION USE: ICD-10-CM

## 2022-03-22 DIAGNOSIS — E78.2 MIXED HYPERLIPIDEMIA: ICD-10-CM

## 2022-03-22 DIAGNOSIS — M25.511 BILATERAL SHOULDER PAIN, UNSPECIFIED CHRONICITY: ICD-10-CM

## 2022-03-22 PROCEDURE — G0439 PPPS, SUBSEQ VISIT: HCPCS | Performed by: INTERNAL MEDICINE

## 2022-03-22 PROCEDURE — 1123F ACP DISCUSS/DSCN MKR DOCD: CPT | Performed by: INTERNAL MEDICINE

## 2022-03-22 PROCEDURE — 99214 OFFICE O/P EST MOD 30 MIN: CPT | Performed by: INTERNAL MEDICINE

## 2022-03-22 RX ORDER — NAPROXEN SODIUM 220 MG
440 TABLET ORAL EVERY 12 HOURS PRN
COMMUNITY

## 2022-03-22 RX ORDER — OMEPRAZOLE 20 MG/1
20 CAPSULE, DELAYED RELEASE ORAL DAILY
Qty: 90 CAPSULE | Refills: 1 | Status: SHIPPED | OUTPATIENT
Start: 2022-03-22

## 2022-03-22 NOTE — ASSESSMENT & PLAN NOTE
No major issues identified  Patient is up-to-date with age-appropriate screenings and immunizations

## 2022-03-22 NOTE — PROGRESS NOTES
Assessment/Plan:    Class 1 obesity due to excess calories without serious comorbidity with body mass index (BMI) of 30 0 to 30 9 in adult  Doing well  Patient has been exercising and slowly reducing his weight  Essential hypertension  Blood pressure is stable at 108/70  No changes needed to lisinopril dosage  Mixed hyperlipidemia  Continues on omega-3 fish oil capsules but no statin medication  Primary osteoarthritis involving multiple joints  Currently attending physical therapy for his shoulders  High risk medication use  Using NSAIDs daily without any GI prophylaxis  Omeprazole 20 mg daily added to regimen       Diagnoses and all orders for this visit:    Acute pain of both shoulders  -     CBC and Platelet; Future  -     Comprehensive metabolic panel; Future    Bilateral shoulder pain, unspecified chronicity  -     Ambulatory Referral to Physical Therapy; Future    Essential hypertension  -     CBC and Platelet; Future  -     Comprehensive metabolic panel; Future    Mixed hyperlipidemia  -     CBC and Platelet; Future  -     Comprehensive metabolic panel; Future  -     Lipid panel; Future    Primary osteoarthritis involving multiple joints  -     omeprazole (PriLOSEC) 20 mg delayed release capsule; Take 1 capsule (20 mg total) by mouth daily  -     CBC and Platelet; Future  -     Comprehensive metabolic panel; Future    Class 1 obesity due to excess calories without serious comorbidity with body mass index (BMI) of 30 0 to 30 9 in adult    High risk medication use  -     omeprazole (PriLOSEC) 20 mg delayed release capsule; Take 1 capsule (20 mg total) by mouth daily    Other orders  -     naproxen sodium (Aleve) 220 MG tablet; Take 220 mg by mouth every 12 (twelve) hours as needed for mild pain          Subjective:      Patient ID: General Rosales is a 80 y o  male  Patient presents to the office for Medicare wellness visit along with a routine follow-up  He is doing well    He remains active physically he walks on a regular basis  He is attending physical therapy for some arthritis in his shoulders which is progressing nicely  Labs are reviewed  LDL cholesterol is good at 124  Metabolic panel revealed no significant abnormalities  Family History   Problem Relation Age of Onset    No Known Problems Mother     No Known Problems Father      Social History     Socioeconomic History    Marital status: /Civil Union     Spouse name: Not on file    Number of children: Not on file    Years of education: Not on file    Highest education level: Not on file   Occupational History    Not on file   Tobacco Use    Smoking status: Never Smoker    Smokeless tobacco: Never Used   Vaping Use    Vaping Use: Never used   Substance and Sexual Activity    Alcohol use:  Yes     Alcohol/week: 2 0 standard drinks     Types: 2 Standard drinks or equivalent per week    Drug use: Never    Sexual activity: Not on file   Other Topics Concern    Not on file   Social History Narrative    Not on file     Social Determinants of Health     Financial Resource Strain: Not on file   Food Insecurity: Not on file   Transportation Needs: Not on file   Physical Activity: Not on file   Stress: Not on file   Social Connections: Not on file   Intimate Partner Violence: Not on file   Housing Stability: Not on file     Past Medical History:   Diagnosis Date    Back ache     Facial basal cell cancer     HLD (hyperlipidemia)     Hypertension     Obesity     Transient cerebral ischemia        Current Outpatient Medications:     aspirin 81 mg chewable tablet, Chew 81 mg daily, Disp: , Rfl:     Calcium Citrate-Vitamin D (CALCIUM CITRATE MAXIMUM/VIT D) 315-250 MG-UNIT TABS, Take by mouth, Disp: , Rfl:     lisinopril (ZESTRIL) 10 mg tablet, Take 1 tablet (10 mg total) by mouth daily, Disp: 90 tablet, Rfl: 1    Lutein 10 MG TABS, Take 10 mg by mouth daily , Disp: , Rfl:     Multiple Vitamin (MULTIVITAMIN) tablet, Take 1 tablet by mouth daily, Disp: , Rfl:     naproxen sodium (Aleve) 220 MG tablet, Take 220 mg by mouth every 12 (twelve) hours as needed for mild pain, Disp: , Rfl:     Omega-3 Fatty Acids (OMEGA-3 FISH OIL) 1200 MG CAPS, Take 2 capsules by mouth 2 (two) times a day , Disp: , Rfl:     TURMERIC PO, Take 1 tablet by mouth daily , Disp: , Rfl:     omeprazole (PriLOSEC) 20 mg delayed release capsule, Take 1 capsule (20 mg total) by mouth daily, Disp: 90 capsule, Rfl: 1  No Known Allergies  Past Surgical History:   Procedure Laterality Date    COLONOSCOPY  2018         Review of Systems   Constitutional: Negative  Negative for activity change, appetite change, chills, diaphoresis, fatigue, fever and unexpected weight change  HENT: Negative  Eyes: Negative  Respiratory: Negative  Cardiovascular: Negative  Gastrointestinal: Negative  Endocrine: Negative  Genitourinary: Negative  Musculoskeletal: Positive for arthralgias (Bilateral shoulders)  Skin: Negative  Neurological: Negative  Hematological: Negative  Psychiatric/Behavioral: The patient is not nervous/anxious  Objective:      /70 (BP Location: Left arm, Patient Position: Sitting, Cuff Size: Large)   Pulse 82   Temp (!) 96 4 °F (35 8 °C) (Tympanic)   Ht 6' 1 82" (1 875 m)   Wt 106 kg (233 lb 6 4 oz)   SpO2 98%   BMI 30 11 kg/m²          Physical Exam  Vitals reviewed  Constitutional:       General: He is not in acute distress  Appearance: Normal appearance  He is normal weight  He is not ill-appearing, toxic-appearing or diaphoretic  HENT:      Head: Normocephalic and atraumatic  Right Ear: External ear normal       Left Ear: External ear normal       Nose: Nose normal    Eyes:      General: No scleral icterus  Conjunctiva/sclera: Conjunctivae normal       Pupils: Pupils are equal, round, and reactive to light  Neck:      Vascular: No carotid bruit or JVD        Trachea: No tracheal deviation  Cardiovascular:      Rate and Rhythm: Normal rate and regular rhythm  Pulses: Normal pulses  Heart sounds: Normal heart sounds  No murmur heard  Pulmonary:      Effort: Pulmonary effort is normal  No respiratory distress  Breath sounds: Normal breath sounds  No rales  Abdominal:      General: Abdomen is flat  There is no distension  Musculoskeletal:         General: No swelling  Cervical back: Neck supple  Right lower leg: No edema  Left lower leg: No edema  Skin:     General: Skin is warm  Coloration: Skin is not jaundiced  Findings: No bruising, erythema or rash  Neurological:      General: No focal deficit present  Mental Status: He is alert and oriented to person, place, and time  Mental status is at baseline     Psychiatric:         Mood and Affect: Mood normal          Behavior: Behavior normal

## 2022-03-22 NOTE — PROGRESS NOTES
Assessment and Plan:     Problem List Items Addressed This Visit     None           Preventive health issues were discussed with patient, and age appropriate screening tests were ordered as noted in patient's After Visit Summary  Personalized health advice and appropriate referrals for health education or preventive services given if needed, as noted in patient's After Visit Summary  History of Present Illness:     Patient presents for Medicare Annual Wellness visit    Patient Care Team:  Karie Martinez as PCP - General     Problem List:     Patient Active Problem List   Diagnosis    Essential hypertension    Mixed hyperlipidemia    Class 1 obesity due to excess calories without serious comorbidity with body mass index (BMI) of 31 0 to 31 9 in adult    Primary osteoarthritis involving multiple joints      Past Medical and Surgical History:     Past Medical History:   Diagnosis Date    Back ache     Facial basal cell cancer     HLD (hyperlipidemia)     Hypertension     Obesity     Transient cerebral ischemia      Past Surgical History:   Procedure Laterality Date    COLONOSCOPY  2018      Family History:     No family history on file  Social History:     Social History     Socioeconomic History    Marital status: /Civil Union     Spouse name: Not on file    Number of children: Not on file    Years of education: Not on file    Highest education level: Not on file   Occupational History    Not on file   Tobacco Use    Smoking status: Never Smoker    Smokeless tobacco: Never Used   Vaping Use    Vaping Use: Never used   Substance and Sexual Activity    Alcohol use:  Yes     Alcohol/week: 4 0 standard drinks     Types: 4 Standard drinks or equivalent per week    Drug use: Never    Sexual activity: Not on file   Other Topics Concern    Not on file   Social History Narrative    Not on file     Social Determinants of Health     Financial Resource Strain: Not on file   Food Insecurity: Not on file   Transportation Needs: Not on file   Physical Activity: Not on file   Stress: Not on file   Social Connections: Not on file   Intimate Partner Violence: Not on file   Housing Stability: Not on file      Medications and Allergies:     Current Outpatient Medications   Medication Sig Dispense Refill    aspirin 81 mg chewable tablet Chew 81 mg daily      Calcium Citrate-Vitamin D (CALCIUM CITRATE MAXIMUM/VIT D) 315-250 MG-UNIT TABS Take by mouth      lisinopril (ZESTRIL) 10 mg tablet Take 1 tablet (10 mg total) by mouth daily 90 tablet 1    Lutein 10 MG TABS Take 10 mg by mouth daily       Multiple Vitamin (MULTIVITAMIN) tablet Take 1 tablet by mouth daily      Omega-3 Fatty Acids (OMEGA-3 FISH OIL) 1200 MG CAPS Take 2 capsules by mouth 2 (two) times a day       TURMERIC PO Take 1 tablet by mouth daily        No current facility-administered medications for this visit  No Known Allergies   Immunizations:     Immunization History   Administered Date(s) Administered    COVID-19 PFIZER VACCINE 0 3 ML IM 01/16/2021, 02/06/2021    INFLUENZA 09/13/2018, 10/15/2019, 08/31/2020    Influenza, high dose seasonal 0 7 mL 08/31/2020    MMR 06/06/2017    Pneumococcal Conjugate 13-Valent 09/07/2016    Pneumococcal Polysaccharide PPV23 01/09/2015    Td (adult), Unspecified 06/06/2017    Td (adult), adsorbed 06/06/2017    Zoster 06/06/2017    Zoster Vaccine Recombinant 05/19/2019, 07/19/2019      Health Maintenance:         Topic Date Due    Hepatitis C Screening  Never done         Topic Date Due    DTaP,Tdap,and Td Vaccines (1 - Tdap) 06/07/2017    COVID-19 Vaccine (3 - Booster for Pfizer series) 07/06/2021    Influenza Vaccine (1) 09/01/2021      Medicare Health Risk Assessment:     There were no vitals taken for this visit  [facility] is here for his Subsequent Wellness visit  Health Risk Assessment:   Patient rates overall health as good   Patient feels that their physical health rating is same  Patient is satisfied with their life  Eyesight was rated as same  Hearing was rated as same  Patient feels that their emotional and mental health rating is same  Patients states they are sometimes angry  Patient states they are sometimes unusually tired/fatigued  Pain experienced in the last 7 days has been some  Patient's pain rating has been 2/10  Patient states that he has experienced no weight loss or gain in last 6 months  Depression Screening:   PHQ-2 Score: 0      Fall Risk Screening: In the past year, patient has experienced: no history of falling in past year      Home Safety:  Patient does not have trouble with stairs inside or outside of their home  Patient has working smoke alarms and has working carbon monoxide detector  Home safety hazards include: none  Nutrition:   Current diet is Regular  Medications:   Patient is currently taking over-the-counter supplements  OTC medications include: see medication list  Patient is able to manage medications  Activities of Daily Living (ADLs)/Instrumental Activities of Daily Living (IADLs):   Walk and transfer into and out of bed and chair?: Yes  Dress and groom yourself?: Yes    Bathe or shower yourself?: Yes    Feed yourself? Yes  Do your laundry/housekeeping?: Yes  Manage your money, pay your bills and track your expenses?: Yes  Make your own meals?: Yes    Do your own shopping?: Yes    Durable Medical Equipment Suppliers  no    Previous Hospitalizations:   Any hospitalizations or ED visits within the last 12 months?: No      Advance Care Planning:   Living will: Yes    Durable POA for healthcare:  Yes    Advanced directive: Yes    Advanced directive counseling given: Yes    Five wishes given: Yes    Patient declined ACP directive: Yes    End of Life Decisions reviewed with patient: Yes    Provider agrees with end of life decisions: Yes      Cognitive Screening:   Provider or family/friend/caregiver concerned regarding cognition?: No    PREVENTIVE SCREENINGS      Cardiovascular Screening:    General: Screening Not Indicated and History Lipid Disorder      Diabetes Screening:     General: Screening Current      Colorectal Cancer Screening:     General: Screening Current      Prostate Cancer Screening:    General: Screening Not Indicated and Screening Current      Osteoporosis Screening:    General: Screening Not Indicated      Abdominal Aortic Aneurysm (AAA) Screening:        General: Screening Not Indicated      Lung Cancer Screening:     General: Screening Not Indicated      Hepatitis C Screening:    General: Screening Not Indicated    Screening, Brief Intervention, and Referral to Treatment (SBIRT)    Screening  Typical number of drinks in a day: 0  Typical number of drinks in a week: 2  Interpretation: Low risk drinking behavior      Single Item Drug Screening:  How often have you used an illegal drug (including marijuana) or a prescription medication for non-medical reasons in the past year? never    Single Item Drug Screen Score: 0  Interpretation: Negative screen for possible drug use disorder      Susy Yoon MD

## 2022-03-24 ENCOUNTER — EVALUATION (OUTPATIENT)
Dept: PHYSICAL THERAPY | Age: 82
End: 2022-03-24
Payer: MEDICARE

## 2022-03-24 DIAGNOSIS — M25.511 BILATERAL SHOULDER PAIN, UNSPECIFIED CHRONICITY: Primary | ICD-10-CM

## 2022-03-24 DIAGNOSIS — M25.512 BILATERAL SHOULDER PAIN, UNSPECIFIED CHRONICITY: Primary | ICD-10-CM

## 2022-03-24 PROCEDURE — 97161 PT EVAL LOW COMPLEX 20 MIN: CPT | Performed by: PHYSICAL THERAPIST

## 2022-03-24 PROCEDURE — 97110 THERAPEUTIC EXERCISES: CPT | Performed by: PHYSICAL THERAPIST

## 2022-03-24 NOTE — PROGRESS NOTES
PT Evaluation     Today's date: 3/24/2022  Patient name: Venkatesh Washington  :   MRN: [id number]  Referring provider: Donalda Councilman*  Dx:   Encounter Diagnosis     ICD-10-CM    1  Bilateral shoulder pain, unspecified chronicity  M25 511 Ambulatory Referral to Physical Therapy    M25 512                   Assessment  Assessment details: Patient presents with b/l RTC tendonitis - decreased strength noted  Patient is an excellent candidate for PT intervention  Impairments: abnormal or restricted ROM, impaired physical strength, lacks appropriate home exercise program and pain with function  Understanding of Dx/Px/POC: good   Prognosis: good  Prognosis details: Short Term goals - 4 weeks  1  Patient will be independent HEP  2   Patient will report a 50% decrease in pain complaints  3   Increase strength 1/2 grade  4   Increase ROM 5-10 degrees  Long Term goals - 8 weeks  1  Patient will report elimination of pain complaints  2   Patient will return to all work related activities without restriction  3   Patient will return to all recreational activities without restriction  4   ROM WFL  5   Strength 5/5  Short Term goals - 4 weeks  1  Patient will be independent HEP  2   Patient will report a 50% decrease in pain complaints  3   Increase strength 1/2 grade  4   Increase ROM 5-10 degrees  Long Term goals - 8 weeks  1  Patient will report elimination of pain complaints  2   Patient will return to all work related activities without restriction  3   Patient will return to all recreational activities without restriction  4   ROM WFL  5   Strength 5/5  Plan  Planned therapy interventions: manual therapy, joint mobilization, strengthening, stretching and therapeutic exercise  Frequency: 2x week  Duration in weeks: 4        Subjective Evaluation    History of Present Illness  Mechanism of injury: Patient reports a couple weeks of increasing b/l shoulder pain - no BAY   L>R    Neg radiating sx's  Neg hand subjective sx's  Neg cervical pain  No diagnostic testing was performed  Sx's aggravated by lifting, elevations, and reaching in back seat of car  Quality of life: excellent    Pain  Current pain ratin  At best pain ratin  At worst pain ratin  Quality: sharp, dull ache and throbbing  Progression: no change    Patient Goals  Patient goals for therapy: decreased pain, increased motion, increased strength and independence with ADLs/IADLs          Objective     Passive Range of Motion   Left Shoulder   Normal passive range of motion    Right Shoulder   Normal passive range of motion    Strength/Myotome Testing     Left Shoulder     Planes of Motion   Flexion: 4   Extension: 4   Abduction: 4   External rotation at 0°: 3+   Internal rotation at 0°: 4     Right Shoulder     Planes of Motion   Flexion: 4-   Extension: 3+   Abduction: 3+   External rotation at 0°: 4-   Internal rotation at 0°: 3+     Tests     Left Shoulder   Positive empty can, full can, Hawkin's, Neer's and painful arc  Negative drop arm, external rotation lag sign, horn blower, internal rotation lag sign, ULTT1, ULTT2, ULTT3 and ULTT4  Right Shoulder   Positive painful arc  Negative empty can, external rotation lag sign, full can, ULTT1, ULTT2, ULTT3 and ULTT4                Precautions: None      Manuals             UBE             MRE's IR/ER             PROM/stretching all motions                          Neuro Re-Ed                                                                                                        Ther Ex             Sidelying ER             Prone b/l shld abd                                                                 Tami IR                          Ther Activity                                       Gait Training                                       Modalities

## 2022-03-30 ENCOUNTER — OFFICE VISIT (OUTPATIENT)
Dept: PHYSICAL THERAPY | Age: 82
End: 2022-03-30
Payer: MEDICARE

## 2022-03-30 DIAGNOSIS — M25.512 BILATERAL SHOULDER PAIN, UNSPECIFIED CHRONICITY: Primary | ICD-10-CM

## 2022-03-30 DIAGNOSIS — M25.511 BILATERAL SHOULDER PAIN, UNSPECIFIED CHRONICITY: Primary | ICD-10-CM

## 2022-03-30 PROCEDURE — 97140 MANUAL THERAPY 1/> REGIONS: CPT | Performed by: PHYSICAL THERAPIST

## 2022-03-30 PROCEDURE — 97110 THERAPEUTIC EXERCISES: CPT | Performed by: PHYSICAL THERAPIST

## 2022-03-30 NOTE — PROGRESS NOTES
Daily Note     Today's date: 3/30/2022  Patient name: Mellisa Aguilera  :   MRN: [id number]  Referring provider: Verner Hover*  Dx:   Encounter Diagnosis     ICD-10-CM    1  Bilateral shoulder pain, unspecified chronicity  M25 511     M25 512                   Subjective: Patient reports modest gains at this point  Objective: See treatment diary below      Assessment: Tolerated treatment well  Patient would benefit from continued PT      Plan: Continue per plan of care        Precautions: None      Manuals 3/30            UBE 8 minutes            MRE's IR/ER 2x12            PROM/stretching all motions completed - minimal restriction noted - IR primarily                         Neuro Re-Ed                                                                                                        Ther Ex             Sidelying ER 2x12 - #            Prone b/l shld abd 2x10                                                                Tami IR 2x12                         Ther Activity                                       Gait Training                                       Modalities

## 2022-04-04 ENCOUNTER — APPOINTMENT (OUTPATIENT)
Dept: PHYSICAL THERAPY | Age: 82
End: 2022-04-04
Payer: MEDICARE

## 2022-04-06 ENCOUNTER — OFFICE VISIT (OUTPATIENT)
Dept: PHYSICAL THERAPY | Age: 82
End: 2022-04-06
Payer: MEDICARE

## 2022-04-06 DIAGNOSIS — M25.512 BILATERAL SHOULDER PAIN, UNSPECIFIED CHRONICITY: Primary | ICD-10-CM

## 2022-04-06 DIAGNOSIS — M25.511 BILATERAL SHOULDER PAIN, UNSPECIFIED CHRONICITY: Primary | ICD-10-CM

## 2022-04-06 PROCEDURE — 97140 MANUAL THERAPY 1/> REGIONS: CPT | Performed by: PHYSICAL THERAPIST

## 2022-04-06 PROCEDURE — 97110 THERAPEUTIC EXERCISES: CPT | Performed by: PHYSICAL THERAPIST

## 2022-04-06 NOTE — PROGRESS NOTES
Daily Note     Today's date: 2022  Patient name: Melida Jasso  :   MRN: [id number]  Referring provider: Mer Mohr*  Dx:   Encounter Diagnosis     ICD-10-CM    1  Bilateral shoulder pain, unspecified chronicity  M25 511     M25 512                   Subjective: Patient reports improved function and decreased symptoms  Objective: See treatment diary below      Assessment: Tolerated treatment well  Patient would benefit from continued PT      Plan: Continue per plan of care        Precautions: None      Manuals 3/30 4/6           UBE 8 minutes x8 minutes           MRE's IR/ER 2x12 2x12           PROM/stretching all motions completed - minimal restriction noted - IR primarily completed                        Neuro Re-Ed                                                                                                        Ther Ex             Sidelying ER 2x12 - # 2# - 2x12           Prone b/l shld abd 2x10 0# - 2x10                                                               Tami IR 2x12 2x12                        Ther Activity                                       Gait Training                                       Modalities

## 2022-04-08 ENCOUNTER — OFFICE VISIT (OUTPATIENT)
Dept: PHYSICAL THERAPY | Age: 82
End: 2022-04-08
Payer: MEDICARE

## 2022-04-08 DIAGNOSIS — M25.511 BILATERAL SHOULDER PAIN, UNSPECIFIED CHRONICITY: Primary | ICD-10-CM

## 2022-04-08 DIAGNOSIS — M25.512 BILATERAL SHOULDER PAIN, UNSPECIFIED CHRONICITY: Primary | ICD-10-CM

## 2022-04-08 PROCEDURE — 97140 MANUAL THERAPY 1/> REGIONS: CPT | Performed by: PHYSICAL THERAPIST

## 2022-04-08 PROCEDURE — 97110 THERAPEUTIC EXERCISES: CPT | Performed by: PHYSICAL THERAPIST

## 2022-04-08 NOTE — PROGRESS NOTES
Daily Note     Today's date: 2022  Patient name: Mason Boone  :   MRN: [id number]  Referring provider: Cyn Britt  Dx:   Encounter Diagnosis     ICD-10-CM    1  Bilateral shoulder pain, unspecified chronicity  M25 511     M25 512                   Subjective: Feeling stronger  Objective: See treatment diary below      Assessment: Tolerated treatment well  Patient would benefit from continued PT      Plan: Continue per plan of care        Precautions: None      Manuals 3/30 4/6 4/8          UBE 8 minutes x8 minutes x8 minutes          MRE's IR/ER 2x12 2x12 2x12          PROM/stretching all motions completed - minimal restriction noted - IR primarily completed completed                       Neuro Re-Ed                                                                                                        Ther Ex             Sidelying ER 2x12 - # 2# - 2x12 3#          Prone b/l shld abd 2x10 0# - 2x10 0#                                                              [place] IR 2x12 2x12 2x12          [place] rows   3x10          Ther Activity                                       Gait Training                                       Modalities

## 2022-04-12 ENCOUNTER — OFFICE VISIT (OUTPATIENT)
Dept: PHYSICAL THERAPY | Age: 82
End: 2022-04-12
Payer: MEDICARE

## 2022-04-12 DIAGNOSIS — M25.511 BILATERAL SHOULDER PAIN, UNSPECIFIED CHRONICITY: Primary | ICD-10-CM

## 2022-04-12 DIAGNOSIS — M25.512 BILATERAL SHOULDER PAIN, UNSPECIFIED CHRONICITY: Primary | ICD-10-CM

## 2022-04-12 PROCEDURE — 97110 THERAPEUTIC EXERCISES: CPT | Performed by: PHYSICAL THERAPIST

## 2022-04-12 PROCEDURE — 97140 MANUAL THERAPY 1/> REGIONS: CPT | Performed by: PHYSICAL THERAPIST

## 2022-04-13 NOTE — PROGRESS NOTES
Daily Note     Today's date: 2022  Patient name: Melida Jasso  :   MRN: [id number]  Referring provider: Mer Mohr*  Dx:   Encounter Diagnosis     ICD-10-CM    1  Bilateral shoulder pain, unspecified chronicity  M25 511     M25 512                   Subjective: Patient presents with decreasing sx's - some discomfort with R shoulder resisted IR noted  Objective: See treatment diary below      Assessment: Tolerated treatment well  Patient would benefit from continued PT      Plan: Continue per plan of care        Precautions: None      Manuals 3/30 4/6 4/8 4/12         UBE 8 minutes x8 minutes x8 minutes x10 minutes         MRE's IR/ER 2x12 2x12 2x12 2x12         PROM/stretching all motions completed - minimal restriction noted - IR primarily completed completed completed                      Neuro Re-Ed                                                                                                        Ther Ex             Sidelying ER 2x12 - # 2# - 2x12 3# 2# - 3x10         Prone b/l shld abd 2x10 0# - 2x10 0# 0# - 3x10                                                             Tami IR 2x12 2x12 2x12 3x10         Tami rows   3x10 3x10         Ther Activity                                       Gait Training                                       Modalities

## 2022-04-14 ENCOUNTER — OFFICE VISIT (OUTPATIENT)
Dept: PHYSICAL THERAPY | Age: 82
End: 2022-04-14
Payer: MEDICARE

## 2022-04-14 DIAGNOSIS — M25.511 BILATERAL SHOULDER PAIN, UNSPECIFIED CHRONICITY: Primary | ICD-10-CM

## 2022-04-14 DIAGNOSIS — M25.512 BILATERAL SHOULDER PAIN, UNSPECIFIED CHRONICITY: Primary | ICD-10-CM

## 2022-04-14 PROCEDURE — 97110 THERAPEUTIC EXERCISES: CPT | Performed by: PHYSICAL THERAPIST

## 2022-04-14 PROCEDURE — 97140 MANUAL THERAPY 1/> REGIONS: CPT | Performed by: PHYSICAL THERAPIST

## 2022-04-14 NOTE — PROGRESS NOTES
Daily Note     Today's date: 2022  Patient name: Navarro Kaur  :   MRN: [id number]  Referring provider: New Tristan*  Dx:   Encounter Diagnosis     ICD-10-CM    1  Bilateral shoulder pain, unspecified chronicity  M25 511     M25 512                   Subjective: Patient reports no new sx's - was able to work in yard since weather has been nicer  Objective: See treatment diary below      Assessment: Tolerated treatment well  Patient exhibited good technique with therapeutic exercises and would benefit from continued PT      Plan: Continue per plan of care        Precautions: None      Manuals 3/30 4/6 4/8 4/12 4/14        UBE 8 minutes x8 minutes x8 minutes x10 minutes x10 minutes        MRE's IR/ER 2x12 2x12 2x12 2x12 2x12        PROM/stretching all motions completed - minimal restriction noted - IR primarily completed completed completed completed                     Neuro Re-Ed                                                                                                        Ther Ex             Sidelying ER 2x12 - # 2# - 2x12 3# 2# - 3x10 2#        Prone b/l shld abd 2x10 0# - 2x10 0# 0# - 3x10 0#                                                            Tami IR 2x12 2x12 2x12 3x10 3x10        Tami rows   3x10 3x10 3x10        Ther Activity                                       Gait Training                                       Modalities

## 2022-04-20 ENCOUNTER — OFFICE VISIT (OUTPATIENT)
Dept: PHYSICAL THERAPY | Age: 82
End: 2022-04-20
Payer: MEDICARE

## 2022-04-20 DIAGNOSIS — M25.511 BILATERAL SHOULDER PAIN, UNSPECIFIED CHRONICITY: Primary | ICD-10-CM

## 2022-04-20 DIAGNOSIS — M25.512 BILATERAL SHOULDER PAIN, UNSPECIFIED CHRONICITY: Primary | ICD-10-CM

## 2022-04-20 PROCEDURE — 97110 THERAPEUTIC EXERCISES: CPT | Performed by: PHYSICAL THERAPIST

## 2022-04-20 PROCEDURE — 97140 MANUAL THERAPY 1/> REGIONS: CPT | Performed by: PHYSICAL THERAPIST

## 2022-04-20 NOTE — PROGRESS NOTES
Daily Note     Today's date: 2022  Patient name: Ernie Chavira  :   MRN: [id number]  Referring provider: Aguila Rios*  Dx:   Encounter Diagnosis     ICD-10-CM    1  Bilateral shoulder pain, unspecified chronicity  M25 511     M25 512                   Subjective: Patient notes strength and functional gains  Objective: See treatment diary below      Assessment: Tolerated treatment well  Patient would benefit from continued PT      Plan: Continue per plan of care        Precautions: None      Manuals 3/30 4/6 4/8 4/12 4/14 4/20       UBE 8 minutes x8 minutes x8 minutes x10 minutes x10 minutes x10 minutes       MRE's IR/ER 2x12 2x12 2x12 2x12 2x12 2x12       PROM/stretching all motions completed - minimal restriction noted - IR primarily completed completed completed completed completed                    Neuro Re-Ed                                                                                                        Ther Ex             Sidelying ER 2x12 - # 2# - 2x12 3# 2# - 3x10 2# 2#       Prone b/l shld abd 2x10 0# - 2x10 0# 0# - 3x10 0# 0#                                                           Tami IR 2x12 2x12 2x12 3x10 3x10 3x10       [place] rows   3x10 3x10 3x10 3x10       Ther Activity                                       Gait Training                                       Modalities

## 2022-04-22 ENCOUNTER — OFFICE VISIT (OUTPATIENT)
Dept: PHYSICAL THERAPY | Age: 82
End: 2022-04-22
Payer: MEDICARE

## 2022-04-22 DIAGNOSIS — M25.512 BILATERAL SHOULDER PAIN, UNSPECIFIED CHRONICITY: Primary | ICD-10-CM

## 2022-04-22 DIAGNOSIS — M25.511 BILATERAL SHOULDER PAIN, UNSPECIFIED CHRONICITY: Primary | ICD-10-CM

## 2022-04-22 PROCEDURE — 97110 THERAPEUTIC EXERCISES: CPT | Performed by: PHYSICAL THERAPIST

## 2022-04-22 PROCEDURE — 97140 MANUAL THERAPY 1/> REGIONS: CPT | Performed by: PHYSICAL THERAPIST

## 2022-04-22 NOTE — PROGRESS NOTES
Daily Note     Today's date: 2022  Patient name: Navarro Kaur  :   MRN: [id number]  Referring provider: New Tristan*  Dx:   Encounter Diagnosis     ICD-10-CM    1  Bilateral shoulder pain, unspecified chronicity  M25 511     M25 512                   Subjective: Feeling good  Objective: See treatment diary below      Assessment: Tolerated treatment well  Patient would benefit from continued PT      Plan: Continue per plan of care        Precautions: None      Manuals 3/30 4/6 4/8 4/12 4/14 4/20 4/22      UBE 8 minutes x8 minutes x8 minutes x10 minutes x10 minutes x10 minutes x10 minutes      MRE's IR/ER 2x12 2x12 2x12 2x12 2x12 2x12 2x12      PROM/stretching all motions completed - minimal restriction noted - IR primarily completed completed completed completed completed completed                   Neuro Re-Ed                                                                                                        Ther Ex             Sidelying ER 2x12 - # 2# - 2x12 3# 2# - 3x10 2# 2# 2#      Prone b/l shld abd 2x10 0# - 2x10 0# 0# - 3x10 0# 0# 0#                                                          [place] IR 2x12 2x12 2x12 3x10 3x10 3x10 35# - 3x10      Tami rows   3x10 3x10 3x10 3x10 3x10      Ther Activity                                       Gait Training                                       Modalities

## 2022-04-25 ENCOUNTER — APPOINTMENT (OUTPATIENT)
Dept: PHYSICAL THERAPY | Age: 82
End: 2022-04-25
Payer: MEDICARE

## 2022-04-27 ENCOUNTER — APPOINTMENT (OUTPATIENT)
Dept: PHYSICAL THERAPY | Age: 82
End: 2022-04-27
Payer: MEDICARE

## 2022-05-02 ENCOUNTER — OFFICE VISIT (OUTPATIENT)
Dept: PHYSICAL THERAPY | Age: 82
End: 2022-05-02
Payer: MEDICARE

## 2022-05-02 DIAGNOSIS — M25.512 BILATERAL SHOULDER PAIN, UNSPECIFIED CHRONICITY: Primary | ICD-10-CM

## 2022-05-02 DIAGNOSIS — M25.511 BILATERAL SHOULDER PAIN, UNSPECIFIED CHRONICITY: Primary | ICD-10-CM

## 2022-05-02 PROCEDURE — 97110 THERAPEUTIC EXERCISES: CPT | Performed by: PHYSICAL THERAPIST

## 2022-05-02 PROCEDURE — 97140 MANUAL THERAPY 1/> REGIONS: CPT | Performed by: PHYSICAL THERAPIST

## 2022-05-03 NOTE — PROGRESS NOTES
Daily Note     Today's date: 2022  Patient name: Ubaldo Chu  : [id number]  MRN: [id number]  Referring provider: Dipika Ayala*  Dx:   Encounter Diagnosis     ICD-10-CM    1  Bilateral shoulder pain, unspecified chronicity  M25 511     M25 512                   Subjective: No complaints offered  Objective: See treatment diary below      Assessment: Tolerated treatment well  Patient would benefit from continued PT      Plan: Continue per plan of care        Precautions: None      Manuals 3/30 4/6 4/8 4/12 4/14 4/20 4/22 5/2     UBE 8 minutes x8 minutes x8 minutes x10 minutes x10 minutes x10 minutes x10 minutes x10 minutes     MRE's IR/ER 2x12 2x12 2x12 2x12 2x12 2x12 2x12 2x12     PROM/stretching all motions completed - minimal restriction noted - IR primarily completed completed completed completed completed completed completed                  Neuro Re-Ed                                                                                                        Ther Ex             Sidelying ER 2x12 - # 2# - 2x12 3# 2# - 3x10 2# 2# 2# 2#     Prone b/l shld abd 2x10 0# - 2x10 0# 0# - 3x10 0# 0# 0# 0#                                                         [place] IR 2x12 2x12 2x12 3x10 3x10 3x10 35# - 3x10 35# - 3x10     [place] rows   3x10 3x10 3x10 3x10 3x10 3x10     Ther Activity                                       Gait Training                                       Modalities

## 2022-05-05 ENCOUNTER — OFFICE VISIT (OUTPATIENT)
Dept: PHYSICAL THERAPY | Age: 82
End: 2022-05-05
Payer: MEDICARE

## 2022-05-05 DIAGNOSIS — M25.512 BILATERAL SHOULDER PAIN, UNSPECIFIED CHRONICITY: Primary | ICD-10-CM

## 2022-05-05 DIAGNOSIS — M25.511 BILATERAL SHOULDER PAIN, UNSPECIFIED CHRONICITY: Primary | ICD-10-CM

## 2022-05-05 PROCEDURE — 97110 THERAPEUTIC EXERCISES: CPT | Performed by: PHYSICAL THERAPIST

## 2022-05-05 PROCEDURE — 97140 MANUAL THERAPY 1/> REGIONS: CPT | Performed by: PHYSICAL THERAPIST

## 2022-05-05 NOTE — PROGRESS NOTES
Daily Note     Today's date: 2022  Patient name: Jasmin Palacio  : [birth year]  MRN: [id number]  Referring provider: Wang Ramirez*  Dx:   Encounter Diagnosis     ICD-10-CM    1  Bilateral shoulder pain, unspecified chronicity  M25 511     M25 512                   Subjective: Patient feeling good  Objective: See treatment diary below      Assessment: Tolerated treatment well  Patient would benefit from continued PT      Plan: D/C to HEP  Goals achieved       Precautions: None      Manuals 3/30 4/6 4/8 4/12 4/14 4/20 4/22 5/2 5/5    UBE 8 minutes x8 minutes x8 minutes x10 minutes x10 minutes x10 minutes x10 minutes x10 minutes x10 minutes    MRE's IR/ER 2x12 2x12 2x12 2x12 2x12 2x12 2x12 2x12 2x12    PROM/stretching all motions completed - minimal restriction noted - IR primarily completed completed completed completed completed completed completed completed                 Neuro Re-Ed                                                                                                        Ther Ex             Sidelying ER 2x12 - # 2# - 2x12 3# 2# - 3x10 2# 2# 2# 2# 2#    Prone b/l shld abd 2x10 0# - 2x10 0# 0# - 3x10 0# 0# 0# 0# 2#                                                        [place] IR 2x12 2x12 2x12 3x10 3x10 3x10 35# - 3x10 35# - 3x10 35#    Tami rows   3x10 3x10 3x10 3x10 3x10 3x10 3x10    Ther Activity                                       Gait Training                                       Modalities

## 2022-05-10 ENCOUNTER — OFFICE VISIT (OUTPATIENT)
Dept: INTERNAL MEDICINE CLINIC | Facility: CLINIC | Age: 82
End: 2022-05-10
Payer: MEDICARE

## 2022-05-10 VITALS
HEART RATE: 80 BPM | OXYGEN SATURATION: 97 % | HEIGHT: 74 IN | TEMPERATURE: 96.7 F | BODY MASS INDEX: 30.54 KG/M2 | SYSTOLIC BLOOD PRESSURE: 108 MMHG | DIASTOLIC BLOOD PRESSURE: 68 MMHG | WEIGHT: 238 LBS

## 2022-05-10 DIAGNOSIS — R09.82 POST-NASAL DRIP: Primary | ICD-10-CM

## 2022-05-10 PROCEDURE — 99213 OFFICE O/P EST LOW 20 MIN: CPT | Performed by: INTERNAL MEDICINE

## 2022-05-10 RX ORDER — FEXOFENADINE HYDROCHLORIDE 60 MG/1
60 TABLET, FILM COATED ORAL DAILY
Qty: 30 TABLET | Refills: 0 | Status: SHIPPED | OUTPATIENT
Start: 2022-05-10 | End: 2022-06-09

## 2022-05-10 RX ORDER — FLUTICASONE PROPIONATE 50 MCG
1 SPRAY, SUSPENSION (ML) NASAL DAILY
Qty: 15.8 ML | Refills: 0 | Status: SHIPPED | OUTPATIENT
Start: 2022-05-10 | End: 2022-05-17

## 2022-05-10 NOTE — ASSESSMENT & PLAN NOTE
Pt presenting for 2-3 weeks mild cough, worse at night, occasionally productive of clear sputum, not associated with fever, chills, SOB, congestion, rhinorrhea, changes in bowel or bladder habits  No known sick contacts  Endorses post-nasal drip and has had watery eyes per wife, but pt denies known history of seasonal allergies  PE unremarkable  Suspect cough due to PND, most likely caused by allergic rhinnitis  No features in history or on exam to suggest GERD, viral URI, pneumonia, CHF as etiology of cough  -Start Aung Gonzalez, recommend taking daily at bedtime for first week to assess efficacy  -Start Flonase 1 spray each nostril for 7 days  -Call office if symptoms have not improved after 7-10 days--would consider adding Singulair if symptoms are unimproved   Otherwise keep scheduled f/u visit in August

## 2022-05-10 NOTE — PROGRESS NOTES
Assessment/Plan:    Post-nasal drip  Pt presenting for 2-3 weeks mild cough, worse at night, occasionally productive of clear sputum, not associated with fever, chills, SOB, congestion, rhinorrhea, changes in bowel or bladder habits  No known sick contacts  Endorses post-nasal drip and has had watery eyes per wife, but pt denies known history of seasonal allergies  PE unremarkable  Suspect cough due to PND, most likely caused by allergic rhinnitis  No features in history or on exam to suggest GERD, viral URI, pneumonia, CHF as etiology of cough  -Start Jenna Menezes, recommend taking daily at bedtime for first week to assess efficacy  -Start Flonase 1 spray each nostril for 7 days  -Call office if symptoms have not improved after 7-10 days--would consider adding Singulair if symptoms are unimproved  Otherwise keep scheduled f/u visit in August Diagnoses and all orders for this visit:    Post-nasal drip  -     fexofenadine (ALLEGRA) 60 MG tablet; Take 1 tablet (60 mg total) by mouth daily  -     fluticasone (FLONASE) 50 mcg/act nasal spray; 1 spray into each nostril daily for 7 days    Other orders  -     Misc Natural Products (PROSTATE SUPPORT PO); Take 1 tablet by mouth in the morning  -     Multiple Vitamins-Minerals (EYE SUPPORT PO); Take by mouth Ultimate Eye Support 12 mg Lutein, 6 mg Zerxanthin, 4 mg  Astaxathin  2 tabs daily  -     Green Tea, Joy sinensis, (GREEN TEA EXTRACT PO); Take by mouth 1 dropper daily  -     Misc Natural Products (LEG VEIN & CIRCULATION PO); Take by mouth Circulation Vein Support 2 tabs daily  -     Misc Natural Products (CHOLESTEROL SUPPORT PO); Take by mouth Cholestacare 800 mg  1 tab every other day          Subjective:      Patient ID: Navarro Kaur is a 80 y o  male  Mr Jacqui Espinoza is seen in clinic today for evaluation of cough  Pt reports that cough started 2-3 weeks ago, worse at night, occasionally productive of clear sputum  Pt reports that symptoms are mild  Denies history of seasonal allergies, denies congestion or rhinorrhea, but does endorse post-nasal drip  Wife reports that she has noticed he has watery eyes, though pt denies watery or itchy eyes  Denies history of GERD or burning pain in the chest  Denies SOB, fevers, chills, CP, pleurisy, Abd pain, changes in bowel or bladder habits  Pt feels that symptoms are mild and minimally disruptive  The following portions of the patient's history were reviewed and updated as appropriate: allergies, current medications, past family history, past medical history, past social history, past surgical history and problem list     Review of Systems   Constitutional: Negative  HENT: Positive for postnasal drip  Negative for congestion, ear discharge, ear pain, rhinorrhea, sinus pressure, sinus pain and sneezing  Eyes: Negative for pain, itching and visual disturbance  Respiratory: Positive for cough  Negative for shortness of breath  Cardiovascular: Negative for chest pain and leg swelling  Gastrointestinal: Negative for abdominal pain, diarrhea, nausea and vomiting  Endocrine: Negative  Genitourinary: Negative  Musculoskeletal: Negative  Skin: Negative  Allergic/Immunologic: Negative for environmental allergies  Neurological: Negative  Hematological: Negative  Psychiatric/Behavioral: Negative  Objective:      /68 (BP Location: Left arm, Patient Position: Sitting, Cuff Size: Large)   Pulse 80   Temp (!) 96 7 °F (35 9 °C) (Tympanic)   Ht 6' 1 7" (1 872 m)   Wt 108 kg (238 lb)   SpO2 97%   BMI 30 81 kg/m²          Physical Exam  Vitals reviewed  Constitutional:       General: He is not in acute distress  Appearance: Normal appearance  He is obese  He is not ill-appearing, toxic-appearing or diaphoretic  HENT:      Head: Normocephalic and atraumatic  Right Ear: Tympanic membrane, ear canal and external ear normal  There is no impacted cerumen        Left Ear: Tympanic membrane, ear canal and external ear normal  There is no impacted cerumen  Nose: No congestion or rhinorrhea  Comments: Erythematous nasal mucosa      Mouth/Throat:      Mouth: Mucous membranes are moist       Pharynx: Oropharynx is clear  No oropharyngeal exudate or posterior oropharyngeal erythema  Comments: Large tongue and narrow oropharynx limit visualization of the posterior oropharynx  Eyes:      General: No scleral icterus  Conjunctiva/sclera: Conjunctivae normal    Cardiovascular:      Rate and Rhythm: Normal rate and regular rhythm  Pulses: Normal pulses  Heart sounds: Normal heart sounds  No murmur heard  No friction rub  No gallop  Pulmonary:      Effort: Pulmonary effort is normal  No respiratory distress  Breath sounds: Normal breath sounds  No stridor  No wheezing, rhonchi or rales  Chest:      Chest wall: No tenderness  Abdominal:      General: Bowel sounds are normal  There is no distension  Palpations: Abdomen is soft  Tenderness: There is no abdominal tenderness  There is no guarding or rebound  Musculoskeletal:      Right lower leg: No edema  Left lower leg: No edema  Skin:     General: Skin is warm and dry  Coloration: Skin is not jaundiced or pale  Neurological:      Mental Status: He is alert     Psychiatric:         Mood and Affect: Mood normal          Behavior: Behavior normal

## 2022-06-15 ENCOUNTER — OFFICE VISIT (OUTPATIENT)
Dept: INTERNAL MEDICINE CLINIC | Facility: CLINIC | Age: 82
End: 2022-06-15
Payer: MEDICARE

## 2022-06-15 VITALS
TEMPERATURE: 98.2 F | SYSTOLIC BLOOD PRESSURE: 120 MMHG | HEIGHT: 73 IN | WEIGHT: 239 LBS | DIASTOLIC BLOOD PRESSURE: 78 MMHG | OXYGEN SATURATION: 95 % | BODY MASS INDEX: 31.68 KG/M2 | HEART RATE: 88 BPM

## 2022-06-15 DIAGNOSIS — M25.551 RIGHT HIP PAIN: Primary | ICD-10-CM

## 2022-06-15 PROCEDURE — 99213 OFFICE O/P EST LOW 20 MIN: CPT | Performed by: NURSE PRACTITIONER

## 2022-06-15 NOTE — PROGRESS NOTES
Assessment/Plan:    Problem List Items Addressed This Visit        Other    Right hip pain - Primary     - suspect bursitis  - increase naproxen to 440mg bid  - start physical therapy  - if symptoms persist can update right hip XR, XR from 2020 showed mild OA              Relevant Orders    Ambulatory Referral to Physical Therapy             M*Modal software was used to dictate this note  It may contain errors with dictating incorrect words or incorrect spelling  Please contact the provider directly with any questions  Subjective:      Patient ID: Kathya Dsouza is a 80 y o  male  HPI    Patient presents today with concern for acute right upper thigh pain  Pain started gradually in the right upper thigh, below his right hip  He thinks it may be from sitting on his lazy boy chair  The right spring was broken and he was trying to fix it with towels under it but thinks he threw the alignment off  Pain is intermittent  He is still able to walk, mow grass, power wash and do his activities with no issues  He denies any falls  No bruising or swelling  He would like to start physical therapy  The following portions of the patient's history were reviewed and updated as appropriate: allergies, current medications, past family history, past medical history, past social history, past surgical history and problem list     Review of Systems   Constitutional: Negative for chills and fever  Musculoskeletal: Positive for arthralgias  Negative for gait problem           Past Medical History:   Diagnosis Date    Back ache     Facial basal cell cancer     HLD (hyperlipidemia)     Hypertension     Obesity     Transient cerebral ischemia          Current Outpatient Medications:     aspirin 81 mg chewable tablet, Chew 81 mg daily, Disp: , Rfl:     Calcium Citrate-Vitamin D 315-250 MG-UNIT TABS, Take by mouth, Disp: , Rfl:     Green Tea, Joy sinensis, (GREEN TEA EXTRACT PO), Take by mouth 1 dropper daily, Disp: , Rfl:     lisinopril (ZESTRIL) 10 mg tablet, Take 1 tablet (10 mg total) by mouth daily, Disp: 90 tablet, Rfl: 1    Lutein 10 MG TABS, Take 10 mg by mouth daily , Disp: , Rfl:     Misc Natural Products (CHOLESTEROL SUPPORT PO), Take by mouth Cholestacare 800 mg  1 tab every other day, Disp: , Rfl:     Misc Natural Products (LEG VEIN & CIRCULATION PO), Take by mouth Circulation Vein Support 2 tabs daily, Disp: , Rfl:     Misc Natural Products (PROSTATE SUPPORT PO), Take 1 tablet by mouth in the morning, Disp: , Rfl:     Multiple Vitamin (MULTIVITAMIN) tablet, Take 1 tablet by mouth daily, Disp: , Rfl:     Multiple Vitamins-Minerals (EYE SUPPORT PO), Take by mouth Ultimate Eye Support 12 mg Lutein, 6 mg Zerxanthin, 4 mg  Astaxathin  2 tabs daily, Disp: , Rfl:     naproxen sodium (ALEVE) 220 MG tablet, Take 440 mg by mouth every 12 (twelve) hours as needed for mild pain  , Disp: , Rfl:     Omega-3 Fatty Acids (OMEGA-3 FISH OIL) 1200 MG CAPS, Take 2 capsules by mouth 2 (two) times a day , Disp: , Rfl:     omeprazole (PriLOSEC) 20 mg delayed release capsule, Take 1 capsule (20 mg total) by mouth daily, Disp: 90 capsule, Rfl: 1    TURMERIC PO, Take 1 tablet by mouth daily , Disp: , Rfl:     fexofenadine (ALLEGRA) 60 MG tablet, Take 1 tablet (60 mg total) by mouth daily (Patient not taking: Reported on 6/15/2022), Disp: 30 tablet, Rfl: 0    fluticasone (FLONASE) 50 mcg/act nasal spray, 1 spray into each nostril daily for 7 days (Patient not taking: Reported on 6/15/2022), Disp: 15 8 mL, Rfl: 0    No Known Allergies    Social History   Past Surgical History:   Procedure Laterality Date    COLONOSCOPY  2018     Family History   Problem Relation Age of Onset    No Known Problems Mother     No Known Problems Father        Objective:  /78 (BP Location: Left arm, Patient Position: Sitting, Cuff Size: Large)   Pulse 88   Temp 98 2 °F (36 8 °C) (Temporal)   Ht 6' 1 31" (1 862 m)   Wt 108 kg (239 lb)   SpO2 95%   BMI 31 27 kg/m²      Physical Exam  Vitals reviewed  Constitutional:       General: He is not in acute distress  Appearance: Normal appearance  He is not diaphoretic  HENT:      Head: Normocephalic and atraumatic  Eyes:      Extraocular Movements: Extraocular movements intact  Conjunctiva/sclera: Conjunctivae normal       Pupils: Pupils are equal, round, and reactive to light  Cardiovascular:      Rate and Rhythm: Normal rate and regular rhythm  Pulmonary:      Effort: Pulmonary effort is normal  No respiratory distress  Breath sounds: Normal breath sounds  No wheezing, rhonchi or rales  Musculoskeletal:      Right hip: Bony tenderness (greater trochanter ) present  Normal range of motion (reproducible pain with flexion and adduction)  Neurological:      Mental Status: He is alert

## 2022-06-15 NOTE — ASSESSMENT & PLAN NOTE
- suspect bursitis  - increase naproxen to 440mg bid  - start physical therapy  - if symptoms persist can update right hip XR, XR from 2020 showed mild OA

## 2022-06-21 ENCOUNTER — EVALUATION (OUTPATIENT)
Dept: PHYSICAL THERAPY | Age: 82
End: 2022-06-21
Payer: MEDICARE

## 2022-06-21 DIAGNOSIS — M25.551 RIGHT HIP PAIN: Primary | ICD-10-CM

## 2022-06-21 PROCEDURE — 97161 PT EVAL LOW COMPLEX 20 MIN: CPT | Performed by: PHYSICAL THERAPIST

## 2022-06-21 PROCEDURE — 97110 THERAPEUTIC EXERCISES: CPT | Performed by: PHYSICAL THERAPIST

## 2022-06-21 NOTE — PROGRESS NOTES
PT Evaluation     Today's date: 2022  Patient name: Hari Crawford  :   MRN: [id number]  Referring provider: Shahnaz Owens PT  Dx:   Encounter Diagnosis     ICD-10-CM    1  Right hip pain  M25 551                   Assessment  Assessment details: Patient presents with R greater troch bursitis - presents with decreased AROM, PROM, weakness, and pain  Patient is an excellent candidate for PT intervention  Impairments: abnormal or restricted ROM, impaired physical strength, lacks appropriate home exercise program and pain with function  Understanding of Dx/Px/POC: good   Prognosis: good    Goals  Short Term goals - 4 weeks  1  Patient will be independent HEP  2   Patient will report a 50% decrease in pain complaints  3   Increase strength 1/2 grade  4   Increase ROM 5-10 degrees  Long Term goals - 8 weeks  1  Patient will report elimination of pain complaints  2   Patient will return to all work related activities without restriction  3   Patient will return to all recreational activities without restriction  4   ROM WFL  5   Strength 5/5  Plan  Planned therapy interventions: joint mobilization, manual therapy, strengthening and stretching  Frequency: 2x week  Duration in weeks: 4        Subjective Evaluation    History of Present Illness  Mechanism of injury: Patient reports insidious onset of R lateral hip pain approx 4 weeks ago  Current sx's - R lateral hip pain/tightness/soreness  Aggravated by prolonged sitting, ambulation, and lateral hip movement  Neg radiating sx's  Neg sleeping difficulty      Quality of life: excellent    Pain  Current pain ratin  At best pain ratin  At worst pain ratin  Quality: throbbing, dull ache, tight and radiating  Aggravating factors: standing, walking, sitting and stair climbing  Progression: no change    Patient Goals  Patient goals for therapy: decreased pain, increased motion, increased strength and independence with ADLs/IADLs          Objective     Passive Range of Motion     Right Hip   Flexion: 95 degrees   Extension: 15 degrees   External rotation (prone): 10 degrees   Internal rotation (prone): 10 degrees     Strength/Myotome Testing     Right Hip   Planes of Motion   Flexion: 4-  Abduction: 3+  External rotation: 4-  Internal rotation: 4-    Tests     Right Hip   Positive NOEL and FADIR                Precautions: None      Manuals             LC             Piriformis stretch             ITB stretch             Prone quad stretch                                                                                                        Neuro Re-Ed                                                                                                        Ther Ex             SLR flexion             TB hip abd             Bridging             cybex hip abd                                                                 Ther Activity                                       Gait Training                                       Modalities

## 2022-06-24 ENCOUNTER — OFFICE VISIT (OUTPATIENT)
Dept: PHYSICAL THERAPY | Age: 82
End: 2022-06-24
Payer: MEDICARE

## 2022-06-24 DIAGNOSIS — M25.551 RIGHT HIP PAIN: Primary | ICD-10-CM

## 2022-06-24 PROCEDURE — 97110 THERAPEUTIC EXERCISES: CPT | Performed by: PHYSICAL THERAPIST

## 2022-06-24 PROCEDURE — 97140 MANUAL THERAPY 1/> REGIONS: CPT | Performed by: PHYSICAL THERAPIST

## 2022-06-24 NOTE — PROGRESS NOTES
Daily Note     Today's date: 2022  Patient name: Donna Brody  :   MRN: [id number]  Referring provider: Von Kurtz PT  Dx:   Encounter Diagnosis     ICD-10-CM    1  Right hip pain  M25 551                   Subjective: Patient without new complaints  Objective: See treatment diary below      Assessment: Tolerated treatment well  Patient would benefit from continued PT      Plan: Continue per plan of care        Precautions: None      Manuals             LC x10 minutes            Piriformis stretch x6            ITB stretch nt            Prone quad stretch x6                                                                                                       Neuro Re-Ed                                                                                                        Ther Ex             SLR flexion 3 sets            TB hip abd Black - 3x10            Bridging 3x10            cybex hip abd 3x10 - 4 plates                                                                Ther Activity                                       Gait Training                                       Modalities

## 2022-06-29 ENCOUNTER — OFFICE VISIT (OUTPATIENT)
Dept: PHYSICAL THERAPY | Age: 82
End: 2022-06-29
Payer: MEDICARE

## 2022-06-29 DIAGNOSIS — M25.551 RIGHT HIP PAIN: Primary | ICD-10-CM

## 2022-06-29 PROCEDURE — 97140 MANUAL THERAPY 1/> REGIONS: CPT | Performed by: PHYSICAL THERAPIST

## 2022-06-29 PROCEDURE — 97110 THERAPEUTIC EXERCISES: CPT | Performed by: PHYSICAL THERAPIST

## 2022-06-29 NOTE — PROGRESS NOTES
Daily Note     Today's date: 2022  Patient name: Hari Crawford  :   MRN: [id number]  Referring provider: Shahnaz Owens, PT  Dx:   Encounter Diagnosis     ICD-10-CM    1  Right hip pain  M25 551                   Subjective: Modest gains noted  Objective: See treatment diary below      Assessment: Tolerated treatment well  Patient would benefit from continued PT      Plan: Continue per plan of care        Precautions: None      Manuals            LC x10 minutes x10 minutes           Piriformis stretch x6 x6           ITB stretch nt            Prone quad stretch x6 x6                                                                                                      Neuro Re-Ed                                                                                                        Ther Ex             SLR flexion 3 sets 3 sets           TB hip abd Black - 3x10 3x10           Bridging 3x10 3x10           cybex hip abd 3x10 - 4 plates [id number]           Prone knee flexion with hip ext  3x10           Prone knee flexion with hip IR/ER  3x10                                     Ther Activity                                       Gait Training                                       Modalities

## 2022-07-01 ENCOUNTER — OFFICE VISIT (OUTPATIENT)
Dept: PHYSICAL THERAPY | Age: 82
End: 2022-07-01
Payer: MEDICARE

## 2022-07-01 DIAGNOSIS — M25.551 RIGHT HIP PAIN: Primary | ICD-10-CM

## 2022-07-01 PROCEDURE — 97140 MANUAL THERAPY 1/> REGIONS: CPT | Performed by: PHYSICAL THERAPIST

## 2022-07-01 PROCEDURE — 97110 THERAPEUTIC EXERCISES: CPT | Performed by: PHYSICAL THERAPIST

## 2022-07-01 NOTE — PROGRESS NOTES
Daily Note     Today's date: 2022  Patient name: Buzz Tovar  :   MRN: [id number]  Referring provider: Nisa Limon PT  Dx: No diagnosis found  Subjective: Patient without new complaints  Objective: See treatment diary below  Improved hip motion noted today  Assessment: Tolerated treatment well  Patient would benefit from continued PT      Plan: Continue per plan of care        Precautions: None      Manuals           LC x10 minutes x10 minutes x10 minutes          Piriformis stretch x6 x6 x6          ITB stretch nt            Prone quad stretch x6 x6 x6                                                                                                     Neuro Re-Ed                                                                                                        Ther Ex             SLR flexion 3 sets 3 sets 3 sets          TB hip abd Black - 3x10 3x10 3x10          Bridging 3x10 3x10 3x10          cybex hip abd 3x10 - 4 plates 4U66 5K09          Prone knee flexion with hip ext  3x10 3x10          Prone knee flexion with hip IR/ER  3x10 3x10                                    Ther Activity                                       Gait Training                                       Modalities

## 2022-07-05 ENCOUNTER — OFFICE VISIT (OUTPATIENT)
Dept: PHYSICAL THERAPY | Age: 82
End: 2022-07-05
Payer: MEDICARE

## 2022-07-05 DIAGNOSIS — M25.551 RIGHT HIP PAIN: Primary | ICD-10-CM

## 2022-07-05 PROCEDURE — 97140 MANUAL THERAPY 1/> REGIONS: CPT | Performed by: PHYSICAL THERAPIST

## 2022-07-05 PROCEDURE — 97110 THERAPEUTIC EXERCISES: CPT | Performed by: PHYSICAL THERAPIST

## 2022-07-05 NOTE — PROGRESS NOTES
Daily Note     Today's date: 2022  Patient name: Akira Hair  :   MRN: [id number]  Referring provider: Argyle Severin, PT  Dx:   Encounter Diagnosis     ICD-10-CM    1  Right hip pain  M25 551                   Subjective: Patient without new complaints  Objective: See treatment diary below      Assessment: Tolerated treatment well  Patient would benefit from continued PT      Plan: Continue per plan of care        Precautions: None      Manuals          LC x10 minutes x10 minutes x10 minutes x10 minutes         Piriformis stretch x6 x6 x6 x6         ITB stretch nt            Prone quad stretch x6 x6 x6 x6                                                                                                    Neuro Re-Ed                                                                                                        Ther Ex             SLR flexion 3 sets 3 sets 3 sets 3 sets         TB hip abd Black - 3x10 3x10 3x10 3x10         Bridging 3x10 3x10 3x10 3x10         cybex hip abd 3x10 - 4 plates 9T24 5C15 6C85         Prone knee flexion with hip ext  3x10 3x10 3x10         Prone knee flexion with hip IR/ER  3x10 3x10 3x10                                   Ther Activity                                       Gait Training                                       Modalities

## 2022-07-07 ENCOUNTER — OFFICE VISIT (OUTPATIENT)
Dept: PHYSICAL THERAPY | Age: 82
End: 2022-07-07
Payer: MEDICARE

## 2022-07-07 DIAGNOSIS — M25.551 RIGHT HIP PAIN: Primary | ICD-10-CM

## 2022-07-07 PROCEDURE — 97110 THERAPEUTIC EXERCISES: CPT | Performed by: PHYSICAL THERAPIST

## 2022-07-07 PROCEDURE — 97140 MANUAL THERAPY 1/> REGIONS: CPT | Performed by: PHYSICAL THERAPIST

## 2022-07-08 NOTE — PROGRESS NOTES
Daily Note     Today's date: 2022  Patient name: Janny Mahoney  :   MRN: [id number]  Referring provider: Cresencio Patel, PT  Dx:   Encounter Diagnosis     ICD-10-CM    1  Right hip pain  M25 551                   Subjective: No complaints offered      Objective: See treatment diary below  R hip PROM is slow to improve at this point, flexibility of LE is progressing  Assessment: Tolerated treatment well  Patient would benefit from continued PT      Plan: Continue with PT, slower progress than except, add hip mobs as able       Precautions: None      Manuals         LC x10 minutes x10 minutes x10 minutes x10 minutes x10         Piriformis stretch x6 x6 x6 x6 x6 gentle        ITB stretch nt    nt        Prone quad stretch x6 x6 x6 x6 x6                     Hip distraction and P-A mobs      nv                                                                        Neuro Re-Ed                                                                                                        Ther Ex             SLR flexion 3 sets 3 sets 3 sets 3 sets 3x10        TB hip abd Black - 3x10 3x10 3x10 3x10 standing monster walks - blue        Bridging 3x10 3x10 3x10 3x10 3x10        cybex hip abd 3x10 - 4 plates 1J89 3D92 0D45 3x10        Prone knee flexion with hip ext  3x10 3x10 3x10 3x10        Prone knee flexion with hip IR/ER  3x10 3x10 3x10 3x10                                  Ther Activity                                       Gait Training                                       Modalities

## 2022-07-11 ENCOUNTER — OFFICE VISIT (OUTPATIENT)
Dept: PHYSICAL THERAPY | Age: 82
End: 2022-07-11
Payer: MEDICARE

## 2022-07-11 DIAGNOSIS — M25.551 RIGHT HIP PAIN: Primary | ICD-10-CM

## 2022-07-11 PROCEDURE — 97140 MANUAL THERAPY 1/> REGIONS: CPT | Performed by: PHYSICAL THERAPIST

## 2022-07-11 PROCEDURE — 97110 THERAPEUTIC EXERCISES: CPT | Performed by: PHYSICAL THERAPIST

## 2022-07-11 NOTE — PROGRESS NOTES
Daily Note     Today's date: 2022  Patient name: Emily Goldberg  :   MRN: [id number]  Referring provider: Og Eisenberg PT  Dx:   Encounter Diagnosis     ICD-10-CM    1  Right hip pain  M25 551                   Subjective: Patient stated no significant pain prior to treatment session  Objective: See treatment diary below  Assessment: Patient performed exercises without c/o pain; positive response to LAD  Plan: Continue as per POC         Precautions: None      Manuals        LC x10 minutes x10 minutes x10 minutes x10 minutes x10  KK       Piriformis stretch x6 x6 x6 x6 x6 gentle KK       ITB stretch nt    nt        Prone quad stretch x6 x6 x6 x6 x6 KK                    Hip distraction and P-A mobs      LAD - KK                                                                        Neuro Re-Ed                                                                                                        Ther Ex             SLR flexion 3 sets 3 sets 3 sets 3 sets 3x10 3x10       TB hip abd Black - 3x10 3x10 3x10 3x10 standing monster walks - blue standing monster walks - blue       Bridging 3x10 3x10 3x10 3x10 3x10 3x10       cybex hip abd 3x10 - 4 plates 0I53 4T15 6T86 3x10 5 plates 6S62       Prone knee flexion with hip ext  3x10 3x10 3x10 3x10 3x10       Prone knee flexion with hip IR/ER  3x10 3x10 3x10 3x10 3x10                                 Ther Activity                                       Gait Training                                       Modalities

## 2022-07-14 ENCOUNTER — OFFICE VISIT (OUTPATIENT)
Dept: PHYSICAL THERAPY | Age: 82
End: 2022-07-14
Payer: MEDICARE

## 2022-07-14 DIAGNOSIS — M25.551 RIGHT HIP PAIN: Primary | ICD-10-CM

## 2022-07-14 PROCEDURE — 97110 THERAPEUTIC EXERCISES: CPT | Performed by: PHYSICAL THERAPIST

## 2022-07-14 PROCEDURE — 97140 MANUAL THERAPY 1/> REGIONS: CPT | Performed by: PHYSICAL THERAPIST

## 2022-07-14 NOTE — PROGRESS NOTES
Daily Note     Today's date: 2022  Patient name: Narda Phipps  :   MRN: [id number]  Referring provider: Severiano Most, PT  Dx:   Encounter Diagnosis     ICD-10-CM    1  Right hip pain  M25 551                   Subjective: Patient without c/o prior to treatment session  Objective: See treatment diary below  Assessment: Patient performed exercises without c/o pain; positive response to manual intervention  Plan: Continue as per POC         Precautions: None      Manuals       LC x10 minutes x10 minutes x10 minutes x10 minutes x10  10 min 10 min      Piriformis stretch x6 x6 x6 x6 x6 gentle KK KK      ITB stretch nt    nt        Prone quad stretch x6 x6 x6 x6 x6 KK KK                   Hip distraction and P-A mobs      LAD - KK LAD - KK                                                                       Neuro Re-Ed                                                                                                        Ther Ex             SLR flexion 3 sets 3 sets 3 sets 3 sets 3x10 3x10 3x10      TB hip abd Black - 3x10 3x10 3x10 3x10 standing monster walks - blue standing monster walks - blue standing monster walks - blue      Bridging 3x10 3x10 3x10 3x10 3x10 3x10 3x10      cybex hip abd 3x10 - 4 plates 0G42 4Z19 1K50 3x10 5 plates 8T05 5 plates 7Y39      Prone knee flexion with hip ext  3x10 3x10 3x10 3x10 3x10 3x10      Prone knee flexion with hip IR/ER  3x10 3x10 3x10 3x10 3x10 3x10                                Ther Activity                                       Gait Training                                       Modalities

## 2022-07-18 ENCOUNTER — OFFICE VISIT (OUTPATIENT)
Dept: PHYSICAL THERAPY | Age: 82
End: 2022-07-18
Payer: MEDICARE

## 2022-07-18 DIAGNOSIS — M25.551 RIGHT HIP PAIN: Primary | ICD-10-CM

## 2022-07-18 PROCEDURE — 97140 MANUAL THERAPY 1/> REGIONS: CPT | Performed by: PHYSICAL THERAPIST

## 2022-07-18 PROCEDURE — 97110 THERAPEUTIC EXERCISES: CPT | Performed by: PHYSICAL THERAPIST

## 2022-07-18 NOTE — PROGRESS NOTES
Daily Note     Today's date: 2022  Patient name: Ana Mancera  :   MRN: [id number]  Referring provider: Nathanael Bernardo, PT  Dx:   Encounter Diagnosis     ICD-10-CM    1  Right hip pain  M25 551                   Subjective: Patient feeling slowly better  Objective: See treatment diary below  Hip PROM and AROM are improving  Intermittent sharper sx's persist       Assessment: Tolerated treatment well  Patient would benefit from continued PT      Plan: Continue per plan of care        Precautions: None      Manuals      LC x10 minutes x10 minutes x10 minutes x10 minutes x10  10 min 10 min x10 minutes     Piriformis stretch x6 x6 x6 x6 x6 gentle KK KK JH     ITB stretch nt    nt   [facility] ZACK     Prone quad stretch x6 x6 x6 x6 x6 KK KK                   Hip distraction and P-A mobs      LAD - KK LAD - KK [facility] ZACK                                                                      Neuro Re-Ed                                                                                                        Ther Ex             SLR flexion 3 sets 3 sets 3 sets 3 sets 3x10 3x10 3x10 3x10     TB hip abd Black - 3x10 3x10 3x10 3x10 standing monster walks - blue standing monster walks - blue standing monster walks - blue 3x10     Bridging 3x10 3x10 3x10 3x10 3x10 3x10 3x10 3x10     cybex hip abd 3x10 - 4 plates 8E31 1C83 6V26 3x10 5 plates 4R82 5 plates 2Q09 completed     Prone knee flexion with hip ext  3x10 3x10 3x10 3x10 3x10 3x10 completed     Prone knee flexion with hip IR/ER  3x10 3x10 3x10 3x10 3x10 3x10 completed                               Ther Activity                                       Gait Training                                       Modalities

## 2022-07-21 ENCOUNTER — OFFICE VISIT (OUTPATIENT)
Dept: PHYSICAL THERAPY | Age: 82
End: 2022-07-21
Payer: MEDICARE

## 2022-07-21 DIAGNOSIS — M25.551 RIGHT HIP PAIN: Primary | ICD-10-CM

## 2022-07-21 PROCEDURE — 97140 MANUAL THERAPY 1/> REGIONS: CPT | Performed by: PHYSICAL THERAPIST

## 2022-07-21 NOTE — PROGRESS NOTES
Daily Note     Today's date: 2022  Patient name: Thad Puente  : [birth year]  MRN: [id number]  Referring provider: Spencer Rand, PT  Dx:   Encounter Diagnosis     ICD-10-CM    1  Right hip pain  M25 551                   Subjective: Overall feeling good, back working out at gym  Objective: See treatment diary below      Assessment: Tolerated treatment well  Patient would benefit from continued PT      Plan: Continue per plan of care        Precautions: None      Manuals     LC x10 minutes x10 minutes x10 minutes x10 minutes x10  10 min 10 min x10 minutes x10 minutes    Piriformis stretch x6 x6 x6 x6 x6 gentle KK KK JH JH    ITB stretch nt    nt   [facility] JH    Prone quad stretch x6 x6 x6 x6 x6 KK KK                   Hip distraction and P-A mobs      LAD - KK LAD - KK [facility]                                                                     Neuro Re-Ed                                                                                                        Ther Ex             SLR flexion 3 sets 3 sets 3 sets 3 sets 3x10 3x10 3x10 3x10 3x10    TB hip abd Black - 3x10 3x10 3x10 3x10 standing monster walks - blue standing monster walks - blue standing monster walks - blue 3x10 3x10    Bridging 3x10 3x10 3x10 3x10 3x10 3x10 3x10 3x10 3x10    cybex hip abd 3x10 - 4 plates 3Q03 4D08 3C85 3x10 5 plates 5H95 5 plates 2N11 completed 3x10    Prone knee flexion with hip ext  3x10 3x10 3x10 3x10 3x10 3x10 completed 3x10    Prone knee flexion with hip IR/ER  3x10 3x10 3x10 3x10 3x10 3x10 completed 3x10                              Ther Activity                                       Gait Training                                       Modalities

## 2022-08-30 ENCOUNTER — TELEPHONE (OUTPATIENT)
Dept: INTERNAL MEDICINE CLINIC | Facility: CLINIC | Age: 82
End: 2022-08-30

## 2022-08-30 NOTE — TELEPHONE ENCOUNTER
Patient's wife called regarding a flu shot for patient  Stated that Sanya Jacob has recently lost about 18lbs due to working out, eating less sugar, salt, and carbs  However, there was a drop in his blood pressure and he has not been taking his medication  Wife called asking if it was a good idea for him to get a flu shot

## 2022-09-13 ENCOUNTER — APPOINTMENT (OUTPATIENT)
Dept: LAB | Age: 82
End: 2022-09-13
Payer: MEDICARE

## 2022-09-13 DIAGNOSIS — M25.512 ACUTE PAIN OF BOTH SHOULDERS: ICD-10-CM

## 2022-09-13 DIAGNOSIS — E78.2 MIXED HYPERLIPIDEMIA: ICD-10-CM

## 2022-09-13 DIAGNOSIS — I10 ESSENTIAL HYPERTENSION: ICD-10-CM

## 2022-09-13 DIAGNOSIS — M15.9 PRIMARY OSTEOARTHRITIS INVOLVING MULTIPLE JOINTS: ICD-10-CM

## 2022-09-13 DIAGNOSIS — M25.511 ACUTE PAIN OF BOTH SHOULDERS: ICD-10-CM

## 2022-09-13 LAB
ALBUMIN SERPL BCP-MCNC: 3.7 G/DL (ref 3.5–5)
ALP SERPL-CCNC: 44 U/L (ref 46–116)
ALT SERPL W P-5'-P-CCNC: 22 U/L (ref 12–78)
ANION GAP SERPL CALCULATED.3IONS-SCNC: 7 MMOL/L (ref 4–13)
AST SERPL W P-5'-P-CCNC: 17 U/L (ref 5–45)
BILIRUB SERPL-MCNC: 1.13 MG/DL (ref 0.2–1)
BUN SERPL-MCNC: 14 MG/DL (ref 5–25)
CALCIUM SERPL-MCNC: 8.9 MG/DL (ref 8.3–10.1)
CHLORIDE SERPL-SCNC: 106 MMOL/L (ref 96–108)
CHOLEST SERPL-MCNC: 191 MG/DL
CO2 SERPL-SCNC: 24 MMOL/L (ref 21–32)
CREAT SERPL-MCNC: 0.91 MG/DL (ref 0.6–1.3)
ERYTHROCYTE [DISTWIDTH] IN BLOOD BY AUTOMATED COUNT: 13.3 % (ref 11.6–15.1)
GFR SERPL CREATININE-BSD FRML MDRD: 79 ML/MIN/1.73SQ M
GLUCOSE P FAST SERPL-MCNC: 92 MG/DL (ref 65–99)
HCT VFR BLD AUTO: 42.1 % (ref 36.5–49.3)
HDLC SERPL-MCNC: 67 MG/DL
HGB BLD-MCNC: 14.2 G/DL (ref 12–17)
LDLC SERPL CALC-MCNC: 110 MG/DL (ref 0–100)
MCH RBC QN AUTO: 33.7 PG (ref 26.8–34.3)
MCHC RBC AUTO-ENTMCNC: 33.7 G/DL (ref 31.4–37.4)
MCV RBC AUTO: 100 FL (ref 82–98)
NONHDLC SERPL-MCNC: 124 MG/DL
POTASSIUM SERPL-SCNC: 4.4 MMOL/L (ref 3.5–5.3)
PROT SERPL-MCNC: 7 G/DL (ref 6.4–8.4)
RBC # BLD AUTO: 4.21 MILLION/UL (ref 3.88–5.62)
SODIUM SERPL-SCNC: 137 MMOL/L (ref 135–147)
TRIGL SERPL-MCNC: 69 MG/DL
WBC # BLD AUTO: 5.42 THOUSAND/UL (ref 4.31–10.16)

## 2022-09-13 PROCEDURE — 80061 LIPID PANEL: CPT

## 2022-09-13 PROCEDURE — 85027 COMPLETE CBC AUTOMATED: CPT

## 2022-09-13 PROCEDURE — 80053 COMPREHEN METABOLIC PANEL: CPT

## 2022-09-13 PROCEDURE — 36415 COLL VENOUS BLD VENIPUNCTURE: CPT

## 2022-09-21 ENCOUNTER — OFFICE VISIT (OUTPATIENT)
Dept: INTERNAL MEDICINE CLINIC | Facility: CLINIC | Age: 82
End: 2022-09-21
Payer: MEDICARE

## 2022-09-21 VITALS
DIASTOLIC BLOOD PRESSURE: 70 MMHG | WEIGHT: 223.4 LBS | BODY MASS INDEX: 28.67 KG/M2 | TEMPERATURE: 97 F | OXYGEN SATURATION: 98 % | SYSTOLIC BLOOD PRESSURE: 120 MMHG | HEIGHT: 74 IN | RESPIRATION RATE: 14 BRPM | HEART RATE: 72 BPM

## 2022-09-21 DIAGNOSIS — E78.2 MIXED HYPERLIPIDEMIA: ICD-10-CM

## 2022-09-21 DIAGNOSIS — Z79.899 HIGH RISK MEDICATION USE: Primary | ICD-10-CM

## 2022-09-21 DIAGNOSIS — K21.9 GASTROESOPHAGEAL REFLUX DISEASE WITHOUT ESOPHAGITIS: ICD-10-CM

## 2022-09-21 DIAGNOSIS — I10 ESSENTIAL HYPERTENSION: ICD-10-CM

## 2022-09-21 PROBLEM — R09.82 POST-NASAL DRIP: Status: RESOLVED | Noted: 2022-05-10 | Resolved: 2022-09-21

## 2022-09-21 PROCEDURE — 99214 OFFICE O/P EST MOD 30 MIN: CPT | Performed by: INTERNAL MEDICINE

## 2022-09-21 RX ORDER — LISINOPRIL 5 MG/1
5 TABLET ORAL DAILY
Qty: 90 TABLET | Refills: 1 | Status: SHIPPED | OUTPATIENT
Start: 2022-09-21 | End: 2022-09-22 | Stop reason: SDUPTHER

## 2022-09-21 NOTE — ASSESSMENT & PLAN NOTE
History of GERD with continued use of NSAIDs despite recommendations to the contrary    Patient continues on omeprazole 20 mg daily prophylactically

## 2022-09-21 NOTE — ASSESSMENT & PLAN NOTE
Continued use of Aleve with a history of GERD  Continue omeprazole 20 mg daily    Recommended discontinuation

## 2022-09-21 NOTE — PROGRESS NOTES
Name: William Guzman      :       MRN: [id number]  Encounter Provider: Kristin Reeder MD  Encounter Date: 2022   Encounter department: [address] [place] [address]     1  High risk medication use  Assessment & Plan:  Continued use of Aleve with a history of GERD  Continue omeprazole 20 mg daily  Recommended discontinuation    Orders:  -     CBC and differential; Future; Expected date: 2023  -     Comprehensive metabolic panel; Future; Expected date: 2023    2  Essential hypertension  Assessment & Plan:  Blood pressure readings reviewed  His blood pressure readings generally have been low at home initial blood pressure reading in the office was mildly elevated on recheck his systolic blood pressure is 120  A late chart entry did not take noted the proper time of blood pressure measurement which was not 5:15 it was 2:15    Orders:  -     lisinopril (ZESTRIL) 5 mg tablet; Take 1 tablet (5 mg total) by mouth daily  -     CBC and differential; Future; Expected date: 2023  -     Comprehensive metabolic panel; Future; Expected date: 2023    3  Mixed hyperlipidemia  Assessment & Plan:  Lipids are near goal levels  LDL cholesterol is 110    Orders:  -     CBC and differential; Future; Expected date: 2023  -     Comprehensive metabolic panel; Future; Expected date: 2023  -     Lipid panel; Future; Expected date: 2023    4  Gastroesophageal reflux disease without esophagitis  Assessment & Plan:  History of GERD with continued use of NSAIDs despite recommendations to the contrary  Patient continues on omeprazole 20 mg daily prophylactically    Orders:  -     CBC and differential; Future; Expected date: 2023  -     Comprehensive metabolic panel; Future; Expected date: 2023         Subjective      Patient presents to the office for follow-up visit  Review of Systems   Constitutional: Negative    Negative for activity change, appetite change, chills, diaphoresis, fatigue, fever and unexpected weight change  HENT: Negative  Eyes: Negative  Respiratory: Negative  Cardiovascular: Negative  Gastrointestinal: Negative  Endocrine: Negative  Genitourinary: Negative  Musculoskeletal: Negative  Skin: Negative  Neurological: Negative  Hematological: Negative  Psychiatric/Behavioral: The patient is not nervous/anxious          Current Outpatient Medications on File Prior to Visit   Medication Sig    aspirin 81 mg chewable tablet Chew 81 mg daily    Calcium Citrate-Vitamin D 315-250 MG-UNIT TABS Take by mouth    Green Tea, Joy sinensis, (GREEN TEA EXTRACT PO) Take by mouth 1 dropper daily    Lutein 10 MG TABS Take 10 mg by mouth daily     Misc Natural Products (CHOLESTEROL SUPPORT PO) Take by mouth Ch[facility] 800 mg  1 tab every other day    Misc Natural Products (LEG VEIN & CIRCULATION PO) Take by mouth Circulation Vein Support 2 tabs daily    Misc Natural Products (PROSTATE SUPPORT PO) Take 1 tablet by mouth in the morning    Multiple Vitamin (MULTIVITAMIN) tablet Take 1 tablet by mouth daily    Multiple Vitamins-Minerals (EYE SUPPORT PO) Take by mouth Ultimate Eye Support 12 mg Lutein, 6 mg Zerxanthin, 4 mg  Astaxathin  2 tabs daily    naproxen sodium (ALEVE) 220 MG tablet Take 440 mg by mouth every 12 (twelve) hours as needed for mild pain      Omega-3 Fatty Acids (OMEGA-3 FISH OIL) 1200 MG CAPS Take 2 capsules by mouth 2 (two) times a day     omeprazole (PriLOSEC) 20 mg delayed release capsule Take 1 capsule (20 mg total) by mouth daily    TURMERIC PO Take 1 tablet by mouth daily     [DISCONTINUED] lisinopril (ZESTRIL) 10 mg tablet Take 1 tablet (10 mg total) by mouth daily    [DISCONTINUED] fexofenadine (ALLEGRA) 60 MG tablet Take 1 tablet (60 mg total) by mouth daily (Patient not taking: Reported on 6/15/2022)    [DISCONTINUED] fluticasone (FLONASE) 50 mcg/act nasal spray 1 spray into each nostril daily for 7 days (Patient not taking: Reported on 6/15/2022)       Objective     /70 (BP Location: Left arm, Patient Position: Sitting, Cuff Size: Standard)   Pulse 72   Temp (!) 97 °F (36 1 °C)   Resp 14   Ht 6' 2" (1 88 m)   Wt 101 kg (223 lb 6 4 oz)   SpO2 98%   BMI 28 68 kg/m²     Physical Exam  Vitals reviewed  Constitutional:       General: He is not in acute distress  Appearance: Normal appearance  He is normal weight  He is not ill-appearing, toxic-appearing or diaphoretic  HENT:      Head: Normocephalic and atraumatic  Right Ear: External ear normal       Left Ear: External ear normal       Nose: Nose normal    Eyes:      General: No scleral icterus  Conjunctiva/sclera: Conjunctivae normal       Pupils: Pupils are equal, round, and reactive to light  Neck:      Vascular: No carotid bruit or JVD  Trachea: No tracheal deviation  Cardiovascular:      Rate and Rhythm: Normal rate and regular rhythm  Pulses: Normal pulses  Heart sounds: Normal heart sounds  No murmur heard  Pulmonary:      Effort: Pulmonary effort is normal  No respiratory distress  Breath sounds: Normal breath sounds  No rales  Abdominal:      General: Abdomen is flat  There is no distension  Musculoskeletal:         General: No swelling  Cervical back: Neck supple  Right lower leg: No edema  Left lower leg: No edema  Skin:     General: Skin is warm  Coloration: Skin is not jaundiced  Findings: No bruising, erythema or rash  Neurological:      General: No focal deficit present  Mental Status: He is alert and oriented to person, place, and time  Mental status is at baseline     Psychiatric:         Mood and Affect: Mood normal          Behavior: Behavior normal        Ab Rose MD

## 2022-09-21 NOTE — ASSESSMENT & PLAN NOTE
Blood pressure readings reviewed  His blood pressure readings generally have been low at home initial blood pressure reading in the office was mildly elevated on recheck his systolic blood pressure is 120   A late chart entry did not take noted the proper time of blood pressure measurement which was not 5:15 it was 2:15

## 2022-09-22 ENCOUNTER — TELEPHONE (OUTPATIENT)
Dept: INTERNAL MEDICINE CLINIC | Facility: CLINIC | Age: 82
End: 2022-09-22

## 2022-09-22 DIAGNOSIS — I10 ESSENTIAL HYPERTENSION: ICD-10-CM

## 2022-09-22 RX ORDER — LISINOPRIL 5 MG/1
5 TABLET ORAL DAILY
Qty: 90 TABLET | Refills: 1 | Status: SHIPPED | OUTPATIENT
Start: 2022-09-22

## 2022-09-22 NOTE — TELEPHONE ENCOUNTER
Patient wants Rx lisinopril (ZESTRIL) 5 mg tablet to be sent to [facility]  ([facility]) - Jose Miguel, Harpal Rodríguez , sandy       LOV- 9/21/22 NOV- 3/23/22

## 2022-10-11 PROBLEM — Z00.00 MEDICARE ANNUAL WELLNESS VISIT, SUBSEQUENT: Status: RESOLVED | Noted: 2022-03-22 | Resolved: 2022-10-11

## 2022-10-20 ENCOUNTER — TELEPHONE (OUTPATIENT)
Dept: INTERNAL MEDICINE CLINIC | Facility: CLINIC | Age: 82
End: 2022-10-20

## 2022-10-20 DIAGNOSIS — Z79.899 HIGH RISK MEDICATION USE: ICD-10-CM

## 2022-10-20 DIAGNOSIS — M15.9 PRIMARY OSTEOARTHRITIS INVOLVING MULTIPLE JOINTS: ICD-10-CM

## 2022-10-20 RX ORDER — OMEPRAZOLE 20 MG/1
20 CAPSULE, DELAYED RELEASE ORAL DAILY
Qty: 90 CAPSULE | Refills: 1 | Status: SHIPPED | OUTPATIENT
Start: 2022-10-20

## 2022-11-07 ENCOUNTER — TELEPHONE (OUTPATIENT)
Dept: OTHER | Facility: OTHER | Age: 82
End: 2022-11-07

## 2022-11-07 NOTE — TELEPHONE ENCOUNTER
Patient is requesting a scrip for PT be sent over to [address] [facility] office  He has left hip pain  Please call

## 2022-11-07 NOTE — TELEPHONE ENCOUNTER
Called patient & left message to let him know there was a previous script in for PT       I do see pt is scheduled for PT evaluation 11/14/22

## 2022-11-14 ENCOUNTER — EVALUATION (OUTPATIENT)
Dept: PHYSICAL THERAPY | Age: 82
End: 2022-11-14

## 2022-11-14 DIAGNOSIS — M25.552 LEFT HIP PAIN: Primary | ICD-10-CM

## 2022-11-14 NOTE — PROGRESS NOTES
PT Evaluation     Today's date: 2022  Patient name: Jordan Lim  :   MRN: [id number]  Referring provider: Dom Nelson*  Dx:   Encounter Diagnosis     ICD-10-CM    1  Left hip pain  M25 552                   Assessment  Assessment details: Patient with L greater troch bursitis with underlying OA  Patient with decreased AROM, PROM, weakness, and functional limitations  Patient is a good candidate for PT intervention  Impairments: abnormal or restricted ROM, impaired physical strength, lacks appropriate home exercise program and pain with function    Goals  Short Term goals - 4 weeks  1  Patient will be independent HEP  2   Patient will report a 50% decrease in pain complaints  3   Increase strength 1/2 grade  4   Increase ROM 5-10 degrees  Long Term goals - 8 weeks  1  Patient will report elimination of pain complaints  2   Patient will return to all work related activities without restriction  3   Patient will return to all recreational activities without restriction  4   ROM WFL  5   Strength 5/5  Plan  Planned therapy interventions: manual therapy, strengthening, stretching and therapeutic exercise  Frequency: 2x week  Duration in weeks: 6        Subjective Evaluation    History of Present Illness  Mechanism of injury: Patient reports onset of lateral L hip pain approx 4 weeks ago  No BAY  Current sx's L lateral hip pain - aggravated by standing, walking, and twisting  Neg difficulty with sleeping and stairs  No MEDS or diagnostic testing was performed    Quality of life: excellent    Pain  Current pain rating: 3  At best pain ratin  At worst pain ratin  Quality: throbbing, tight, sharp and dull ache  Aggravating factors: standing, walking and lifting  Progression: worsening    Patient Goals  Patient goals for therapy: decreased pain, increased motion, increased strength and independence with ADLs/IADLs          Objective     Passive Range of Motion Left Hip   Flexion: 110 degrees   Extension: 0 degrees   Abduction: 15 degrees   External rotation (prone): 15 degrees   Internal rotation (prone): 15 degrees     Strength/Myotome Testing     Left Hip   Planes of Motion   Flexion: 3+  Abduction: 3+  External rotation: 3+  Internal rotation: 3+    Tests     Left Hip   Positive NOELTERRIIR and scour  Negative long sit, piriformis, SI compression and SI distraction  Nikita: Positive                Precautions: None      Manuals             L hip stretching             Gentle MRE"s prone IR/ER                                       Neuro Re-Ed                                                                                                        Ther Ex             SLR flexion             SLR abd             Isometric hip add             Cybex hip abd             Bridging                                                    Ther Activity                                       Gait Training                                       Modalities

## 2022-11-18 ENCOUNTER — OFFICE VISIT (OUTPATIENT)
Dept: PHYSICAL THERAPY | Age: 82
End: 2022-11-18

## 2022-11-18 DIAGNOSIS — M25.552 LEFT HIP PAIN: Primary | ICD-10-CM

## 2022-11-18 NOTE — PROGRESS NOTES
Daily Note     Today's date: 2022  Patient name: Zhane Bonner  :   MRN: [id number]  Referring provider: Josiah Leal*  Dx:   Encounter Diagnosis     ICD-10-CM    1  Left hip pain  M25 552                      Subjective: Patient without new complaints  Objective: See treatment diary below      Assessment: Tolerated treatment well  Patient would benefit from continued PT      Plan: Continue per plan of care  Precautions: None      Manuals             L hip stretching piriformis stretch            Gentle MRE"s prone IR/ER nt                                      Neuro Re-Ed                                                                                                        Ther Ex             SLR flexion 2x10            SLR abd 2x10            Isometric hip add 2x10 hold 5 sec            Cybex hip abd 5#            Bridging 2x10                         SLS X10 reps hold 10 sec                           Ther Activity                                       Gait Training                                       Modalities

## 2022-11-21 ENCOUNTER — OFFICE VISIT (OUTPATIENT)
Dept: PHYSICAL THERAPY | Age: 82
End: 2022-11-21

## 2022-11-21 DIAGNOSIS — M25.552 LEFT HIP PAIN: Primary | ICD-10-CM

## 2022-11-21 NOTE — PROGRESS NOTES
Daily Note     Today's date: 2022  Patient name: Irish Egan  :   MRN: [id number]  Referring provider: Divine Christensen*  Dx:   Encounter Diagnosis     ICD-10-CM    1  Left hip pain  M25 552           Start Time: 0800  Stop Time: 0845  Total time in clinic (min): 45 minutes    Subjective: Patient reports no adverse effects following LV  Objective: See treatment diary below      Assessment: Patient continues to tolerate treatment well and denies any aggravation of sx reported post session  Patient demonstrates increased quad lag with fatigue during SLR and would benefit from continued strengthening, as able  Plan: Continue per plan of care        Precautions: None      Manuals            L hip stretching piriformis stretch            Gentle MRE"s prone IR/ER nt                                      Neuro Re-Ed                                                                                                        Ther Ex             SLR flexion 2x10 2x10           SLR abd 2x10 2x10           Isometric hip add 2x10 hold 5 sec 2x10 hold 5 sec           Cybex hip abd 50# 60#           Bridging 2x10 2x10           SKTC  10x           SLS X10 reps hold 10 sec             bike  10'           Ther Activity                                       Gait Training                                       Modalities
[FreeTextEntry1] : Romina Yu DO \par [address] [address]\par [phone]\par \par Dear Dr. Yu, \par \par Reason for Visit: Bladder cancer. BPH. Left renal stones s/p left ureteroscopy. \par \par This is a 73 year-old Cantonese-speaking gentleman with a history of bladder cancer s/p TURBT and BPH presenting with left renal stone s/p left ureteroscopy. His CT scan from 2015 demonstrated a 6 mm left renal stone. His cystoscopy demonstrated trilobar hypertrophy obstructing the urinary outlet and no evidence of recurrent bladder cancer. His CT of abdomen and pelvis in October 2021 demonstrated left renal calculi, the largest measuring 11 mm. The patient underwent uneventful left ureteroscopy, laser lithotripsy, stone extraction, and stent placement on February 9. His stone analysis indicated a calcium oxalate stone. The patient returns today for renal ultrasound.  Since he was last seen, the patient's kidney stone urine test was unremarkable. He is currently on Flomax and Proscar for his BPH. He denies any side effects or difficulties with medications. He notes stable urinary symptoms on medical therapy. He denies any hematuria or urinary incontinence. He reports no pain. All other review of systems are negative. Past medical history, family history and social history were inquired and were noncontributory to current condition. Medications and allergies were reviewed. He has no known allergies to medication. \par \par On examination, the patient is a healthy-appearing gentleman in no acute distress. He is alert and oriented follows commands. He has normal mood and affect. He is normocephalic. Neck is supple. Oral no thrush Respirations are unlabored. His abdomen is soft and nontender. Bladder is nonpalpable. No CVA tenderness. Neurologically he is grossly intact. No peripheral edema. Skin without gross abnormality. He has normal male external genitalia. Normal meatus. Bilateral testes are descended intrascrotally and normal to palpation. On rectal examination, there is normal sphincter tone. The prostate is clinically benign without focal induration or nodularity.\par \par His BMP demonstrated normal renal functions, creatinine 0.54. His PSA was 1.02, which is within normal limits. His urinalysis was unremarkable. His urine cytology was negative for high grade urothelial carcinoma.\par  \par His kidney stone urine test was unremarkable. \par \par I personally reviewed ultrasound images with the patient today and images demonstrated a 4 mm echogenic focus with twinkle in the right kidney lower pole. Both kidneys appear normal in size and echogenicity. No stones, solid masses or hydronephrosis visualized.\par \par ASSESSMENT: Bladder cancer. BPH. Left renal stones s/p left ureteroscopy. Small stone fragment.  \par \par I counseled the patient. He is doing well. In terms of his left renal stones, I reassured the patient as his renal ultrasound today demonstrated a 4 mm left stone fragment. His kidney stone urine test was unremarkable. I encouraged patient to maintain a low-protein low-sodium diet. I also encouraged hydration to prevent stone formation.  I recommended the patient follow up in 1 year for renal ultrasound. In terms of his BPH, the patient has stable urinary symptoms on medical therapy. I recommended he continue taking Flomax and Proscar regularly as directed. Risks and alternatives were discussed. I answered the patient questions. The patient will follow-up as directed and will contact me with any questions or concerns. Thank you for the opportunity to participate in the care of Mr. VALADEZ. I will keep you updated on his progress. \par \par Plan: Continue Flomax and Proscar. Stone diet. Hydration. Follow up in 1 year for renal ultrasound.

## 2022-11-28 ENCOUNTER — OFFICE VISIT (OUTPATIENT)
Dept: PHYSICAL THERAPY | Age: 82
End: 2022-11-28

## 2022-11-28 DIAGNOSIS — M25.552 LEFT HIP PAIN: Primary | ICD-10-CM

## 2022-11-28 NOTE — PROGRESS NOTES
Daily Note     Today's date: 2022  Patient name: Devin Remy  :   MRN: [id number]  Referring provider: Anais Mercado*  Dx: No diagnosis found  Subjective: Patient without new complaints      Objective: See treatment diary below      Assessment: Tolerated treatment well  Patient would benefit from continued PT      Plan: Continue per plan of care  Precautions: None      Manuals           L hip stretching piriformis stretch  completed          Gentle MRE"s prone IR/ER nt  2x12                                    Neuro Re-Ed                                                                                                        Ther Ex             SLR flexion 2x10 2x10 3x10          SLR abd 2x10 2x10 3x10          Isometric hip add 2x10 hold 5 sec 2x10 hold 5 sec 3x10          Cybex hip abd 50# 60# 60# - 3x10          Bridging 2x10 2x10 3x10          SKTC  10x x10 reps          SLS X10 reps hold 10 sec    x10 reps          bike  10' x10 minutes          Ther Activity                                       Gait Training                                       Modalities

## 2022-12-02 ENCOUNTER — OFFICE VISIT (OUTPATIENT)
Dept: PHYSICAL THERAPY | Age: 82
End: 2022-12-02

## 2022-12-02 DIAGNOSIS — M25.552 LEFT HIP PAIN: Primary | ICD-10-CM

## 2022-12-02 NOTE — PROGRESS NOTES
Daily Note     Today's date: 2022  Patient name: Mason Boone  : [birth year]  MRN: [id number]  Referring provider: Cyn Britt  Dx:   Encounter Diagnosis     ICD-10-CM    1  Left hip pain  M25 552                      Subjective: Patient notes improvement - increased flexibility noted with ambulation  Objective: See treatment diary below      Assessment: Tolerated treatment well  Patient would benefit from continued PT      Plan: Continue per plan of care  Precautions: None      Manuals  12         L hip stretching piriformis stretch  completed completed         Gentle MRE"s prone IR/ER nt  2x12 2x12                                   Neuro Re-Ed                                                                                                        Ther Ex             SLR flexion 2x10 2x10 3x10 3x10         SLR abd 2x10 2x10 3x10 3x10         Isometric hip add 2x10 hold 5 sec 2x10 hold 5 sec 3x10 3x10         Cybex hip abd 50# 60# 60# - 3x10 60# - 3 sets         Bridging 2x10 2x10 3x10 3x10         SKTC  10x x10 reps nt         SLS X10 reps hold 10 sec    x10 reps x10 reps         bike  10' x10 minutes x10 minutes         Ther Activity                                       Gait Training                                       Modalities

## 2022-12-05 ENCOUNTER — OFFICE VISIT (OUTPATIENT)
Dept: PHYSICAL THERAPY | Age: 82
End: 2022-12-05

## 2022-12-05 DIAGNOSIS — M25.552 LEFT HIP PAIN: Primary | ICD-10-CM

## 2022-12-05 NOTE — PROGRESS NOTES
Daily Note     Today's date: 2022  Patient name: Amanda Sawyer  :   MRN: [id number]  Referring provider: Lisa Dockery*  Dx: No diagnosis found  Subjective: Patient doing better - increased flexibility  Objective: See treatment diary below      Assessment: Tolerated treatment well  Patient would benefit from continued PT      Plan: Continue per plan of care  Precautions: None      Manuals         L hip stretching piriformis stretch  completed completed completed        Gentle MRE"s prone IR/ER nt  2x12 2x12 2x12                                  Neuro Re-Ed                                                                                                        Ther Ex             SLR flexion 2x10 2x10 3x10 3x10 3x10        SLR abd 2x10 2x10 3x10 3x10 3x10        Isometric hip add 2x10 hold 5 sec 2x10 hold 5 sec 3x10 3x10 3x10        Cybex hip abd 50# 60# 60# - 3x10 60# - 3 sets 60#        Bridging 2x10 2x10 3x10 3x10 3x10        SKTC  10x x10 reps nt nt        SLS X10 reps hold 10 sec    x10 reps x10 reps x10 reps        bike  10' x10 minutes x10 minutes x10 minutes        Ther Activity                                       Gait Training                                       Modalities

## 2022-12-08 ENCOUNTER — OFFICE VISIT (OUTPATIENT)
Dept: PHYSICAL THERAPY | Age: 82
End: 2022-12-08

## 2022-12-08 DIAGNOSIS — M25.552 LEFT HIP PAIN: Primary | ICD-10-CM

## 2022-12-08 NOTE — PROGRESS NOTES
Daily Note     Today's date: 2022  Patient name: Carolyn Garcia  : [id number]  MRN: [id number]  Referring provider: Zhang Stroud  Dx:   Encounter Diagnosis     ICD-10-CM    1  Left hip pain  M25 552                      Subjective: Patient without new complaints  Objective: See treatment diary below      Assessment: Tolerated treatment well  Patient would benefit from continued PT      Plan: Continue per plan of care  Precautions: None      Manuals        L hip stretching piriformis stretch  completed completed completed completed       Gentle MRE"s prone IR/ER nt  2x12 2x12 2x12 2x12                                 Neuro Re-Ed                                                                                                        Ther Ex             SLR flexion 2x10 2x10 3x10 3x10 3x10 3x10       SLR abd 2x10 2x10 3x10 3x10 3x10 3x10       Isometric hip add 2x10 hold 5 sec 2x10 hold 5 sec 3x10 3x10 3x10 3x10       Cybex hip abd 50# 60# 60# - 3x10 60# - 3 sets 60# 60#       Bridging 2x10 2x10 3x10 3x10 3x10 3x10       SKTC  10x x10 reps nt nt nt       SLS X10 reps hold 10 sec    x10 reps x10 reps x10 reps x10 reps       bike  10' x10 minutes x10 minutes x10 minutes x10 minutes       Ther Activity                                       Gait Training                                       Modalities

## 2022-12-12 ENCOUNTER — APPOINTMENT (OUTPATIENT)
Dept: PHYSICAL THERAPY | Age: 82
End: 2022-12-12

## 2022-12-13 ENCOUNTER — OFFICE VISIT (OUTPATIENT)
Dept: PHYSICAL THERAPY | Age: 82
End: 2022-12-13

## 2022-12-13 DIAGNOSIS — M25.552 LEFT HIP PAIN: Primary | ICD-10-CM

## 2022-12-13 NOTE — PROGRESS NOTES
Daily Note     Today's date: 2022  Patient name: Akira Hair  : [birth year]  MRN: [id number]  Referring provider: Edd Dow*  Dx:   Encounter Diagnosis     ICD-10-CM    1  Left hip pain  M25 552                      Subjective: Patient without new complaints  Objective: See treatment diary below      Assessment: Tolerated treatment well  Patient would benefit from continued PT      Plan: Continue per plan of care  Precautions: None      Manuals       L hip stretching piriformis stretch  completed completed completed completed completed      Gentle MRE"s prone IR/ER nt  2x12 2x12 2x12 2x12 2x12                                Neuro Re-Ed                                                                                                        Ther Ex             SLR flexion 2x10 2x10 3x10 3x10 3x10 3x10 3x10      SLR abd 2x10 2x10 3x10 3x10 3x10 3x10 3x10      Isometric hip add 2x10 hold 5 sec 2x10 hold 5 sec 3x10 3x10 3x10 3x10 3x10      Cybex hip abd 50# 60# 60# - 3x10 60# - 3 sets 60# 60# 60#      Bridging 2x10 2x10 3x10 3x10 3x10 3x10 3x10      SKTC  10x x10 reps nt nt nt nt      SLS X10 reps hold 10 sec    x10 reps x10 reps x10 reps x10 reps x10 reps      bike  10' x10 minutes x10 minutes x10 minutes x10 minutes x10 minutes      Ther Activity                                       Gait Training                                       Modalities

## 2022-12-15 ENCOUNTER — OFFICE VISIT (OUTPATIENT)
Dept: PHYSICAL THERAPY | Age: 82
End: 2022-12-15

## 2022-12-15 DIAGNOSIS — M25.552 LEFT HIP PAIN: Primary | ICD-10-CM

## 2022-12-15 NOTE — PROGRESS NOTES
Daily Note     Today's date: 12/15/2022  Patient name: Priscila Hdz  :   MRN: [id number]  Referring provider: Elisa Valladares*  Dx:   Encounter Diagnosis     ICD-10-CM    1  Left hip pain  M25 552                      Subjective: Patient notes continued improvement - most functional movement patterns do not cause pain  Objective: See treatment diary below      Assessment: Tolerated treatment well  Patient would benefit from continued PT      Plan: Continue per plan of care  Precautions: None      Manuals 11/18 11/21 11/28 12/2 12/5 12/8 12/13 12/15     L hip stretching piriformis stretch  completed completed completed completed completed completed     Gentle MRE"s prone IR/ER nt  2x12 2x12 2x12 2x12 2x12 2x12                               Neuro Re-Ed                                                                                                        Ther Ex             SLR flexion 2x10 2x10 3x10 3x10 3x10 3x10 3x10 3x10     SLR abd 2x10 2x10 3x10 3x10 3x10 3x10 3x10 3x10     Isometric hip add 2x10 hold 5 sec 2x10 hold 5 sec 3x10 3x10 3x10 3x10 3x10 3x10     Cybex hip abd 50# 60# 60# - 3x10 60# - 3 sets 60# 60# 60# 60#     Bridging 2x10 2x10 3x10 3x10 3x10 3x10 3x10 3x10     SKTC  10x x10 reps nt nt nt nt nt     SLS X10 reps hold 10 sec    x10 reps x10 reps x10 reps x10 reps x10 reps x10 reps     bike  10' x10 minutes x10 minutes x10 minutes x10 minutes x10 minutes x10 minutes     Ther Activity                                       Gait Training                                       Modalities

## 2022-12-20 ENCOUNTER — OFFICE VISIT (OUTPATIENT)
Dept: PHYSICAL THERAPY | Age: 82
End: 2022-12-20

## 2022-12-20 DIAGNOSIS — M25.552 LEFT HIP PAIN: Primary | ICD-10-CM

## 2022-12-21 NOTE — PROGRESS NOTES
Daily Note     Today's date: 2022  Patient name: Riccardo Merchant  : [id number]  MRN: [id number]  Referring provider: Doyle Hashimoto*  Dx:   Encounter Diagnosis     ICD-10-CM    1  Left hip pain  M25 552                      Subjective: Patient with decreasing pain and improved ambulation  Objective: See treatment diary below      Assessment: Tolerated treatment well  Patient would benefit from continued PT      Plan: Continue per plan of care  Precautions: None      Manuals 11/18 11/21 11/28 12/2 12/5 12/8 12/13 12/15 12/21    L hip stretching piriformis stretch  completed completed completed completed completed completed completed    Gentle MRE"s prone IR/ER nt  2x12 2x12 2x12 2x12 2x12 2x12 2x12                              Neuro Re-Ed                                                                                                        Ther Ex             SLR flexion 2x10 2x10 3x10 3x10 3x10 3x10 3x10 3x10 3x10    SLR abd 2x10 2x10 3x10 3x10 3x10 3x10 3x10 3x10 3x10    Isometric hip add 2x10 hold 5 sec 2x10 hold 5 sec 3x10 3x10 3x10 3x10 3x10 3x10 3x10    Cybex hip abd 50# 60# 60# - 3x10 60# - 3 sets 60# 60# 60# 60# 60#    Bridging 2x10 2x10 3x10 3x10 3x10 3x10 3x10 3x10 3x10    SKTC  10x x10 reps nt nt nt nt nt nt    SLS X10 reps hold 10 sec    x10 reps x10 reps x10 reps x10 reps x10 reps x10 reps x10 reps    bike  10' x10 minutes x10 minutes x10 minutes x10 minutes x10 minutes x10 minutes x10 minutes    Ther Activity                                       Gait Training                                       Modalities

## 2022-12-22 ENCOUNTER — OFFICE VISIT (OUTPATIENT)
Dept: PHYSICAL THERAPY | Age: 82
End: 2022-12-22

## 2022-12-22 DIAGNOSIS — M25.552 LEFT HIP PAIN: Primary | ICD-10-CM

## 2022-12-22 NOTE — PROGRESS NOTES
Daily Note     Today's date: 2022  Patient name: Francie Fitch  : [birth year]  MRN: [id number]  Referring provider: Arnaldo Pratt*  Dx:   Encounter Diagnosis     ICD-10-CM    1  Left hip pain  M25 552                      Subjective: Patient feeling good  Objective: See treatment diary below      Assessment: Tolerated treatment well  Plan: D/C to [facility]  Precautions: None      Manuals 11/18 11/21 11/28 12/2 12/5 12/8 12/13 12/15 12/22    L hip stretching piriformis stretch  completed completed completed completed completed completed completed    Gentle MRE"s prone IR/ER nt  2x12 2x12 2x12 2x12 2x12 2x12 2x12                              Neuro Re-Ed                                                                                                        Ther Ex             SLR flexion 2x10 2x10 3x10 3x10 3x10 3x10 3x10 3x10 3x10    SLR abd 2x10 2x10 3x10 3x10 3x10 3x10 3x10 3x10 3x10    Isometric hip add 2x10 hold 5 sec 2x10 hold 5 sec 3x10 3x10 3x10 3x10 3x10 3x10 3x10    Cybex hip abd 50# 60# 60# - 3x10 60# - 3 sets 60# 60# 60# 60# 60#    Bridging 2x10 2x10 3x10 3x10 3x10 3x10 3x10 3x10 3x10    SKTC  10x x10 reps nt nt nt nt nt nt    SLS X10 reps hold 10 sec    x10 reps x10 reps x10 reps x10 reps x10 reps x10 reps x10 reps    bike  10' x10 minutes x10 minutes x10 minutes x10 minutes x10 minutes x10 minutes x10 minutes    Ther Activity                                       Gait Training                                       Modalities

## 2023-03-14 ENCOUNTER — APPOINTMENT (OUTPATIENT)
Dept: LAB | Age: 83
End: 2023-03-14

## 2023-03-14 DIAGNOSIS — Z79.899 HIGH RISK MEDICATION USE: ICD-10-CM

## 2023-03-14 DIAGNOSIS — E78.2 MIXED HYPERLIPIDEMIA: ICD-10-CM

## 2023-03-14 DIAGNOSIS — I10 ESSENTIAL HYPERTENSION: ICD-10-CM

## 2023-03-14 DIAGNOSIS — K21.9 GASTROESOPHAGEAL REFLUX DISEASE WITHOUT ESOPHAGITIS: ICD-10-CM

## 2023-03-14 LAB
ALBUMIN SERPL BCP-MCNC: 3.8 G/DL (ref 3.5–5)
ALP SERPL-CCNC: 42 U/L (ref 46–116)
ALT SERPL W P-5'-P-CCNC: 25 U/L (ref 12–78)
ANION GAP SERPL CALCULATED.3IONS-SCNC: 1 MMOL/L (ref 4–13)
AST SERPL W P-5'-P-CCNC: 22 U/L (ref 5–45)
BASOPHILS # BLD AUTO: 0.06 THOUSANDS/ÂΜL (ref 0–0.1)
BASOPHILS NFR BLD AUTO: 1 % (ref 0–1)
BILIRUB SERPL-MCNC: 0.83 MG/DL (ref 0.2–1)
BUN SERPL-MCNC: 12 MG/DL (ref 5–25)
CALCIUM SERPL-MCNC: 9.3 MG/DL (ref 8.3–10.1)
CHLORIDE SERPL-SCNC: 106 MMOL/L (ref 96–108)
CHOLEST SERPL-MCNC: 198 MG/DL
CO2 SERPL-SCNC: 29 MMOL/L (ref 21–32)
CREAT SERPL-MCNC: 0.8 MG/DL (ref 0.6–1.3)
EOSINOPHIL # BLD AUTO: 0.16 THOUSAND/ÂΜL (ref 0–0.61)
EOSINOPHIL NFR BLD AUTO: 4 % (ref 0–6)
ERYTHROCYTE [DISTWIDTH] IN BLOOD BY AUTOMATED COUNT: 13.2 % (ref 11.6–15.1)
GFR SERPL CREATININE-BSD FRML MDRD: 84 ML/MIN/1.73SQ M
GLUCOSE P FAST SERPL-MCNC: 88 MG/DL (ref 65–99)
HCT VFR BLD AUTO: 43 % (ref 36.5–49.3)
HDLC SERPL-MCNC: 65 MG/DL
HGB BLD-MCNC: 14.4 G/DL (ref 12–17)
IMM GRANULOCYTES # BLD AUTO: 0.01 THOUSAND/UL (ref 0–0.2)
IMM GRANULOCYTES NFR BLD AUTO: 0 % (ref 0–2)
LDLC SERPL CALC-MCNC: 120 MG/DL (ref 0–100)
LYMPHOCYTES # BLD AUTO: 1.45 THOUSANDS/ÂΜL (ref 0.6–4.47)
LYMPHOCYTES NFR BLD AUTO: 32 % (ref 14–44)
MCH RBC QN AUTO: 33.6 PG (ref 26.8–34.3)
MCHC RBC AUTO-ENTMCNC: 33.5 G/DL (ref 31.4–37.4)
MCV RBC AUTO: 100 FL (ref 82–98)
MONOCYTES # BLD AUTO: 0.49 THOUSAND/ÂΜL (ref 0.17–1.22)
MONOCYTES NFR BLD AUTO: 11 % (ref 4–12)
NEUTROPHILS # BLD AUTO: 2.4 THOUSANDS/ÂΜL (ref 1.85–7.62)
NEUTS SEG NFR BLD AUTO: 52 % (ref 43–75)
NONHDLC SERPL-MCNC: 133 MG/DL
NRBC BLD AUTO-RTO: 0 /100 WBCS
PMV BLD AUTO: 10.5 FL (ref 8.9–12.7)
POTASSIUM SERPL-SCNC: 4.3 MMOL/L (ref 3.5–5.3)
PROT SERPL-MCNC: 6.9 G/DL (ref 6.4–8.4)
RBC # BLD AUTO: 4.29 MILLION/UL (ref 3.88–5.62)
SODIUM SERPL-SCNC: 136 MMOL/L (ref 135–147)
TRIGL SERPL-MCNC: 63 MG/DL
WBC # BLD AUTO: 4.57 THOUSAND/UL (ref 4.31–10.16)

## 2023-03-17 ENCOUNTER — RA CDI HCC (OUTPATIENT)
Dept: OTHER | Facility: HOSPITAL | Age: 83
End: 2023-03-17

## 2023-03-20 DIAGNOSIS — I10 ESSENTIAL HYPERTENSION: ICD-10-CM

## 2023-03-20 RX ORDER — LISINOPRIL 5 MG/1
5 TABLET ORAL DAILY
Qty: 90 TABLET | Refills: 1 | Status: SHIPPED | OUTPATIENT
Start: 2023-03-20

## 2023-04-06 ENCOUNTER — OFFICE VISIT (OUTPATIENT)
Dept: INTERNAL MEDICINE CLINIC | Facility: CLINIC | Age: 83
End: 2023-04-06

## 2023-04-06 VITALS
SYSTOLIC BLOOD PRESSURE: 130 MMHG | OXYGEN SATURATION: 98 % | HEART RATE: 75 BPM | BODY MASS INDEX: 29.16 KG/M2 | WEIGHT: 220 LBS | HEIGHT: 73 IN | TEMPERATURE: 97 F | DIASTOLIC BLOOD PRESSURE: 76 MMHG

## 2023-04-06 DIAGNOSIS — I10 ESSENTIAL HYPERTENSION: ICD-10-CM

## 2023-04-06 DIAGNOSIS — K21.9 GASTROESOPHAGEAL REFLUX DISEASE WITHOUT ESOPHAGITIS: ICD-10-CM

## 2023-04-06 DIAGNOSIS — E78.2 MIXED HYPERLIPIDEMIA: ICD-10-CM

## 2023-04-06 DIAGNOSIS — Z00.00 MEDICARE ANNUAL WELLNESS VISIT, SUBSEQUENT: Primary | ICD-10-CM

## 2023-04-06 DIAGNOSIS — M15.9 PRIMARY OSTEOARTHRITIS INVOLVING MULTIPLE JOINTS: ICD-10-CM

## 2023-04-06 DIAGNOSIS — E66.3 OVERWEIGHT WITH BODY MASS INDEX (BMI) OF 28 TO 28.9 IN ADULT: ICD-10-CM

## 2023-04-06 PROBLEM — Z79.899 HIGH RISK MEDICATION USE: Status: RESOLVED | Noted: 2022-03-22 | Resolved: 2023-04-06

## 2023-04-06 RX ORDER — SENNOSIDES 8.6 MG
1300 CAPSULE ORAL 2 TIMES DAILY
COMMUNITY

## 2023-04-06 NOTE — ASSESSMENT & PLAN NOTE
Patient has been following a weight loss program has been successful weight loss of approximately 18 pounds over the past 18 months

## 2023-04-06 NOTE — PROGRESS NOTES
Assessment and Plan:     Problem List Items Addressed This Visit        Digestive    Gastroesophageal reflux disease without esophagitis     Patient has discontinued NSAIDs using only Tylenol for any arthritic pain  Symptoms of GERD have resolved for the most part         Relevant Orders    CBC and differential    Comprehensive metabolic panel       Cardiovascular and Mediastinum    Essential hypertension     Blood pressure is nicely controlled on current medications  Relevant Orders    CBC and differential    Comprehensive metabolic panel    Lipid panel       Musculoskeletal and Integument    Primary osteoarthritis involving multiple joints - Primary     No major arthritic disabilities symptoms are usually relieved with extra strength Tylenol         Relevant Orders    CBC and differential    Comprehensive metabolic panel       Other    Mixed hyperlipidemia     Continues on over-the-counter products for cholesterol and triglyceride control and lipid profile is acceptable and near goal          Relevant Orders    Lipid panel    Overweight with body mass index (BMI) of 28 to 28 9 in adult     Patient has been following a weight loss program has been successful weight loss of approximately 18 pounds over the past 18 months         Relevant Orders    CBC and differential    Comprehensive metabolic panel    Medicare annual wellness visit, subsequent     Is up-to-date with age-appropriate screenings and immunizations  Preventive health issues were discussed with patient, and age appropriate screening tests were ordered as noted in patient's After Visit Summary  Personalized health advice and appropriate referrals for health education or preventive services given if needed, as noted in patient's After Visit Summary       History of Present Illness:     Patient presents for a Medicare Wellness Visit    Patient presents to the office for a Medicare wellness visit along with a follow-up medical exam  He feels well and he has no major complaints  He has switched over to utilize Tylenol arthritis formula for his arthritic complaints  As a result, his reflux symptoms have improved to the point where he no longer requires to take PPI medication Labs were reviewed  Lipids are near goal levels with the use of over-the-counter supplements  CBC is within normal limits, CMP is unremarkable  Patient Care Team:  Edvin Rob MD as PCP - General (Internal Medicine)     Review of Systems:     Review of Systems   Constitutional: Negative  Negative for activity change, appetite change, chills, diaphoresis, fatigue, fever and unexpected weight change  HENT: Negative  Eyes: Negative  Respiratory: Negative  Cardiovascular: Negative  Gastrointestinal: Negative  Endocrine: Negative  Genitourinary: Negative  Musculoskeletal: Positive for arthralgias (intermittent, minor)  Skin: Negative  Neurological: Negative  Hematological: Negative  Psychiatric/Behavioral: The patient is not nervous/anxious           Problem List:     Patient Active Problem List   Diagnosis   • Essential hypertension   • Mixed hyperlipidemia   • Overweight with body mass index (BMI) of 28 to 28 9 in adult   • Primary osteoarthritis involving multiple joints   • Medicare annual wellness visit, subsequent   • Right hip pain   • Gastroesophageal reflux disease without esophagitis      Past Medical and Surgical History:     Past Medical History:   Diagnosis Date   • Back ache    • Facial basal cell cancer    • HLD (hyperlipidemia)    • Hypertension    • Obesity    • Post-nasal drip 5/10/2022   • Transient cerebral ischemia      Past Surgical History:   Procedure Laterality Date   • COLONOSCOPY  2018      Family History:     Family History   Problem Relation Age of Onset   • No Known Problems Mother    • No Known Problems Father       Social History:     Social History     Socioeconomic History   • Marital status: /[employer]     Spouse name: None   • Number of children: None   • Years of education: None   • Highest education level: None   Occupational History   • None   Tobacco Use   • Smoking status: Never   • Smokeless tobacco: Never   Vaping Use   • Vaping Use: Never used   Substance and Sexual Activity   • Alcohol use: Yes     Alcohol/week: 3 0 standard drinks     Types: 3 Standard drinks or equivalent per week   • Drug use: Never   • Sexual activity: None   Other Topics Concern   • None   Social History Narrative   • None     Social Determinants of Health     Financial Resource Strain: Low Risk    • Difficulty of Paying Living Expenses: Not hard at all   Food Insecurity: Not on file   Transportation Needs: No Transportation Needs   • Lack of Transportation (Medical): No   • Lack of Transportation (Non-Medical):  No   Physical Activity: Not on file   Stress: Not on file   Social Connections: Not on file   Intimate Partner Violence: Not on file   Housing Stability: Not on file      Medications and Allergies:     Current Outpatient Medications   Medication Sig Dispense Refill   • acetaminophen (TYLENOL) 650 mg CR tablet Take 1,300 mg by mouth 2 (two) times a day     • aspirin 81 mg chewable tablet Chew 81 mg daily     • Calcium Citrate-Vitamin D 315-250 MG-UNIT TABS Take by mouth     • Green Tea, Joy sinensis, (GREEN TEA EXTRACT PO) Take by mouth 1 dropper daily     • lisinopril (ZESTRIL) 5 mg tablet Take 1 tablet (5 mg total) by mouth daily 90 tablet 1   • Lutein 10 MG TABS Take 10 mg by mouth daily      • Misc Natural Products (CHOLESTEROL SUPPORT PO) Take by mouth Cholestacare 800 mg  1 tab every other day     • Misc Natural Products (LEG VEIN & CIRCULATION PO) Take by mouth Circulation Vein Support 2 tabs daily     • Misc Natural Products (PROSTATE SUPPORT PO) Take 1 tablet by mouth in the morning     • Multiple Vitamin (MULTIVITAMIN) tablet Take 1 tablet by mouth daily     • Multiple Vitamins-Minerals (EYE SUPPORT PO) Take by mouth Ultimate Eye Support 12 mg Lutein, 6 mg Zerxanthin, 4 mg  Astaxathin  2 tabs daily     • Omega-3 Fatty Acids (OMEGA-3 FISH OIL) 1200 MG CAPS Take 2 capsules by mouth 2 (two) times a day      • TURMERIC PO Take 1 tablet by mouth daily        No current facility-administered medications for this visit  No Known Allergies   Immunizations:     Immunization History   Administered Date(s) Administered   • COVID-19 PFIZER VACCINE 0 3 ML IM 01/16/2021, 02/06/2021, 11/05/2021   • INFLUENZA 09/13/2018, 10/15/2019, 08/31/2020   • Influenza, high dose seasonal 0 7 mL 08/31/2020   • MMR 06/06/2017   • Pneumococcal Conjugate 13-Valent 09/07/2016   • Pneumococcal Polysaccharide PPV23 01/09/2015   • Td (adult), Unspecified 06/06/2017   • Td (adult), adsorbed 06/06/2017   • Zoster 06/06/2017   • Zoster Vaccine Recombinant 05/19/2019, 07/19/2019      Health Maintenance: There are no preventive care reminders to display for this patient  Topic Date Due   • COVID-19 Vaccine (4 - Booster for Pfizer series) 12/31/2021   • Influenza Vaccine (1) 09/01/2022      Medicare Screening Tests and Risk Assessments:     Laila Chen is here for his Subsequent Wellness visit  Health Risk Assessment:   Patient rates overall health as good  Patient feels that their physical health rating is same  Patient is satisfied with their life  Eyesight was rated as same  Hearing was rated as same  Patient feels that their emotional and mental health rating is same  Patients states they are never, rarely angry  Patient states they are sometimes unusually tired/fatigued  Pain experienced in the last 7 days has been some  Patient's pain rating has been 3/10  Patient states that he has experienced no weight loss or gain in last 6 months  Depression Screening:   PHQ-2 Score: 0      Fall Risk Screening:    In the past year, patient has experienced: no history of falling in past year      Home Safety:  Patient does not have trouble with stairs inside or outside of their home  Patient has working smoke alarms and has working carbon monoxide detector  Home safety hazards include: none  Nutrition:   Current diet is Regular  Medications:   Patient is currently taking over-the-counter supplements  OTC medications include: see medication list  Patient is able to manage medications  Activities of Daily Living (ADLs)/Instrumental Activities of Daily Living (IADLs):   Walk and transfer into and out of bed and chair?: Yes  Dress and groom yourself?: Yes    Bathe or shower yourself?: Yes    Feed yourself? Yes  Do your laundry/housekeeping?: Yes  Manage your money, pay your bills and track your expenses?: Yes  Make your own meals?: Yes    Do your own shopping?: Yes    Durable Medical Equipment Suppliers  no    Previous Hospitalizations:   Any hospitalizations or ED visits within the last 12 months?: No      Advance Care Planning:   Living will: Yes    Durable POA for healthcare:  Yes    Advanced directive: Yes    Advanced directive counseling given: Yes    Five wishes given: No    Patient declined ACP directive: Yes    End of Life Decisions reviewed with patient: Yes    Provider agrees with end of life decisions: Yes      Cognitive Screening:   Provider or family/friend/caregiver concerned regarding cognition?: No    PREVENTIVE SCREENINGS      Cardiovascular Screening:    General: Screening Not Indicated and History Lipid Disorder      Diabetes Screening:     General: Screening Current      Colorectal Cancer Screening:     General: Screening Current    Due for: Colonoscopy - Low Risk      Prostate Cancer Screening:    General: Screening Not Indicated and Risks and Benefits Discussed      Abdominal Aortic Aneurysm (AAA) Screening:        General: Screening Not Indicated      Lung Cancer Screening:     General: Screening Not Indicated      Hepatitis C Screening:    General: Screening Not Indicated    Screening, Brief Intervention, and "Referral to Treatment (SBIRT)    Screening  Typical number of drinks in a day: 0  Typical number of drinks in a week: 3  Interpretation: Low risk drinking behavior  Single Item Drug Screening:  How often have you used an illegal drug (including marijuana) or a prescription medication for non-medical reasons in the past year? never    Single Item Drug Screen Score: 0  Interpretation: Negative screen for possible drug use disorder    No results found  Physical Exam:     /76 (BP Location: Left arm, Patient Position: Sitting, Cuff Size: Standard)   Pulse 75   Temp (!) 97 °F (36 1 °C) (Tympanic)   Ht 6' 1 23\" (1 86 m)   Wt 99 8 kg (220 lb)   SpO2 98%   BMI 28 85 kg/m²     Physical Exam  Vitals reviewed  Constitutional:       Appearance: He is well-developed and normal weight  HENT:      Head: Normocephalic and atraumatic  Right Ear: External ear normal       Left Ear: External ear normal       Nose: Nose normal       Mouth/Throat:      Pharynx: Oropharynx is clear  Eyes:      General: No scleral icterus  Conjunctiva/sclera: Conjunctivae normal       Pupils: Pupils are equal, round, and reactive to light  Neck:      Vascular: No carotid bruit  Cardiovascular:      Rate and Rhythm: Normal rate and regular rhythm  Heart sounds: Normal heart sounds  No murmur heard  Pulmonary:      Effort: Pulmonary effort is normal  No respiratory distress  Breath sounds: Normal breath sounds  Abdominal:      General: Abdomen is flat  Bowel sounds are normal  There is no distension  Palpations: Abdomen is soft  There is no mass  Tenderness: There is no abdominal tenderness  There is no right CVA tenderness, left CVA tenderness, guarding or rebound  Hernia: No hernia is present  Musculoskeletal:         General: No swelling, tenderness or deformity  Normal range of motion  Cervical back: Normal range of motion and neck supple  Right lower leg: No edema        Left " lower leg: No edema  Skin:     General: Skin is warm and dry  Coloration: Skin is not jaundiced  Findings: No bruising, erythema or rash  Neurological:      General: No focal deficit present  Mental Status: He is alert and oriented to person, place, and time  Mental status is at baseline  Psychiatric:         Mood and Affect: Mood normal          Behavior: Behavior normal          Thought Content:  Thought content normal          Judgment: Judgment normal           Rohith Greer MD

## 2023-04-06 NOTE — ASSESSMENT & PLAN NOTE
Patient has discontinued NSAIDs using only Tylenol for any arthritic pain    Symptoms of GERD have resolved for the most part

## 2023-04-06 NOTE — ASSESSMENT & PLAN NOTE
Continues on over-the-counter products for cholesterol and triglyceride control and lipid profile is acceptable and near goal

## 2023-05-12 ENCOUNTER — OFFICE VISIT (OUTPATIENT)
Dept: INTERNAL MEDICINE CLINIC | Age: 83
End: 2023-05-12

## 2023-05-12 ENCOUNTER — APPOINTMENT (OUTPATIENT)
Dept: RADIOLOGY | Age: 83
End: 2023-05-12

## 2023-05-12 VITALS
TEMPERATURE: 97.4 F | HEART RATE: 83 BPM | OXYGEN SATURATION: 97 % | DIASTOLIC BLOOD PRESSURE: 80 MMHG | SYSTOLIC BLOOD PRESSURE: 126 MMHG | WEIGHT: 224 LBS | HEIGHT: 73 IN | BODY MASS INDEX: 29.69 KG/M2

## 2023-05-12 DIAGNOSIS — M25.511 ACUTE PAIN OF RIGHT SHOULDER: ICD-10-CM

## 2023-05-12 DIAGNOSIS — I10 ESSENTIAL HYPERTENSION: ICD-10-CM

## 2023-05-12 DIAGNOSIS — M25.511 ACUTE PAIN OF RIGHT SHOULDER: Primary | ICD-10-CM

## 2023-05-12 NOTE — ASSESSMENT & PLAN NOTE
Will request x-ray right shoulder and referred patient to physical therapy  Can take Tylenol/Voltaren gel topical as needed  If no improvement in 6 weeks will need referral to orthopedic surgeon for possible steroid injection

## 2023-05-12 NOTE — PROGRESS NOTES
Assessment/Plan:    Acute pain of right shoulder  Will request x-ray right shoulder and referred patient to physical therapy  Can take Tylenol/Voltaren gel topical as needed  If no improvement in 6 weeks will need referral to orthopedic surgeon for possible steroid injection  Essential hypertension  Continue with present regimen  Continue with low-salt diet       Diagnoses and all orders for this visit:    Acute pain of right shoulder  -     XR shoulder 2+ vw right; Future  -     Ambulatory Referral to Physical Therapy; Future    Essential hypertension               Subjective:          Patient ID: Fernanda Ashley is a [de-identified] y o  male  Patient came to office with a complaint of her right shoulder pain/discomfort and limitation of movement especially lifting up  No trauma no fall      The following portions of the patient's history were reviewed and updated as appropriate: allergies, current medications, past family history, past medical history, past social history, past surgical history and problem list     Review of Systems   Constitutional: Negative for fatigue and fever  HENT: Negative for congestion, ear discharge, ear pain, postnasal drip, sinus pressure, sore throat, tinnitus and trouble swallowing  Eyes: Negative for discharge, itching and visual disturbance  Respiratory: Negative for cough and shortness of breath  Cardiovascular: Negative for chest pain and palpitations  Gastrointestinal: Negative for abdominal pain, diarrhea, nausea and vomiting  Endocrine: Negative for cold intolerance and polyuria  Genitourinary: Negative for difficulty urinating, dysuria and urgency  Musculoskeletal: Positive for arthralgias  Negative for neck pain  Skin: Negative for rash  Allergic/Immunologic: Negative for environmental allergies  Neurological: Negative for dizziness, weakness and headaches  Psychiatric/Behavioral: The patient is not nervous/anxious            Past Medical History: "  Diagnosis Date   • Back ache    • Facial basal cell cancer    • HLD (hyperlipidemia)    • Hypertension    • Obesity    • Post-nasal drip 5/10/2022   • Transient cerebral ischemia          Current Outpatient Medications:   •  acetaminophen (TYLENOL) 650 mg CR tablet, Take 1,300 mg by mouth 2 (two) times a day, Disp: , Rfl:   •  aspirin 81 mg chewable tablet, Chew 81 mg daily, Disp: , Rfl:   •  Calcium Citrate-Vitamin D 315-250 MG-UNIT TABS, Take by mouth, Disp: , Rfl:   •  Green Tea, Joy sinensis, (GREEN TEA EXTRACT PO), Take by mouth 1 dropper daily, Disp: , Rfl:   •  lisinopril (ZESTRIL) 5 mg tablet, Take 1 tablet (5 mg total) by mouth daily, Disp: 90 tablet, Rfl: 1  •  Lutein 10 MG TABS, Take 10 mg by mouth daily , Disp: , Rfl:   •  Misc Natural Products (CHOLESTEROL SUPPORT PO), Take by mouth Chol[facility]ac[facility] 800 mg  1 tab every other day, Disp: , Rfl:   •  Misc Natural Products (LEG VEIN & CIRCULATION PO), Take by mouth Circulation Vein Support 2 tabs daily, Disp: , Rfl:   •  Misc Natural Products (PROSTATE SUPPORT PO), Take 1 tablet by mouth in the morning, Disp: , Rfl:   •  Multiple Vitamin (MULTIVITAMIN) tablet, Take 1 tablet by mouth daily, Disp: , Rfl:   •  Multiple Vitamins-Minerals (EYE SUPPORT PO), Take by mouth Ultimate Eye Support 12 mg Lutein, 6 mg Zerxanthin, 4 mg  Astaxathin  2 tabs daily, Disp: , Rfl:   •  Omega-3 Fatty Acids (OMEGA-3 FISH OIL) 1200 MG CAPS, Take 2 capsules by mouth 2 (two) times a day , Disp: , Rfl:   •  TURMERIC PO, Take 1 tablet by mouth daily , Disp: , Rfl:     No Known Allergies    Social History   Past Surgical History:   Procedure Laterality Date   • COLONOSCOPY  2018     Family History   Problem Relation Age of Onset   • No Known Problems Mother    • No Known Problems Father        Objective:  /80 (BP Location: Left arm, Patient Position: Sitting, Cuff Size: Standard)   Pulse 83   Temp (!) 97 4 °F (36 3 °C) (Temporal)   Ht 6' 1 23\" (1 86 m)   Wt 102 kg (224 lb) " SpO2 97%   BMI 29 37 kg/m²   Body mass index is 29 37 kg/m²  Physical Exam  Constitutional:       Appearance: He is well-developed  He is not ill-appearing or diaphoretic  HENT:      Head: Normocephalic  Right Ear: External ear normal       Left Ear: External ear normal       Nose: No rhinorrhea  Mouth/Throat:      Pharynx: No posterior oropharyngeal erythema  Eyes:      General: No scleral icterus  Pupils: Pupils are equal, round, and reactive to light  Neck:      Thyroid: No thyromegaly  Trachea: No tracheal deviation  Cardiovascular:      Rate and Rhythm: Normal rate and regular rhythm  Heart sounds: Normal heart sounds  Pulmonary:      Effort: Pulmonary effort is normal  No respiratory distress  Breath sounds: Normal breath sounds  Chest:      Chest wall: No tenderness  Abdominal:      General: Bowel sounds are normal       Palpations: Abdomen is soft  There is no mass  Tenderness: There is no abdominal tenderness  Musculoskeletal:         General: Tenderness present  Cervical back: Normal range of motion and neck supple  Right lower leg: No edema  Left lower leg: No edema  Comments: Mild tenderness over right acromioclavicular junction noted  Also limitation of lifting arm above shoulder level noted  Also mild weakness of right upper extremity as compared to left noted   Lymphadenopathy:      Cervical: No cervical adenopathy  Skin:     General: Skin is warm  Findings: No lesion or rash  Neurological:      Mental Status: He is alert and oriented to person, place, and time  Cranial Nerves: No cranial nerve deficit     Psychiatric:         Mood and Affect: Mood normal          Behavior: Behavior normal

## 2023-05-15 ENCOUNTER — EVALUATION (OUTPATIENT)
Dept: PHYSICAL THERAPY | Age: 83
End: 2023-05-15

## 2023-05-15 DIAGNOSIS — M25.511 ACUTE PAIN OF RIGHT SHOULDER: Primary | ICD-10-CM

## 2023-05-15 NOTE — PROGRESS NOTES
PT Evaluation     Today's date: 5/15/2023  Patient name: India Roche  :   MRN: [id number]  Referring provider: Tiffany Harrell MD  Dx:   Encounter Diagnosis     ICD-10-CM    1  Acute pain of right shoulder  M25 511 Ambulatory Referral to Physical Therapy                     Assessment  Assessment details: Patient presents with R shoulder bursitis and RTC tendonitis  Patient presents with decreased AROM/PROM (IR), weakness, and postural dysfunction  Impairments: abnormal or restricted ROM, impaired physical strength, lacks appropriate home exercise program and pain with function  Understanding of Dx/Px/POC: good   Prognosis: good    Goals  Short Term goals - 4 weeks  1  Patient will be independent HEP  2   Patient will report a 50% decrease in pain complaints  3   Increase strength 1/2 grade  4   Increase ROM 5-10 degrees  Long Term goals - 8 weeks  1  Patient will report elimination of pain complaints  2   Patient will return to all work related activities without restriction  3   Patient will return to all recreational activities without restriction  4   ROM WFL  5   Strength 5/5  Plan  Frequency: 2x week  Duration in weeks: 4        Subjective Evaluation    History of Present Illness  Mechanism of injury: Patient reports onset of R shoulder pain a couple weeks ago after doing a lot of lifting  Current sx's - ant/lat shoulder pain which is aggravated by elevations  X-rays - demonstrate mild OA and calcific deposits  No treatment started      Quality of life: good    Pain  Current pain ratin  At best pain rating: 3  At worst pain ratin  Quality: sharp  Aggravating factors: overhead activity and lifting  Progression: worsening          Objective     Passive Range of Motion   Left Shoulder   Flexion: 170 degrees   External rotation 90°: [facility]  Internal rotation 0°: 65 degrees     Right Shoulder   Flexion: 155 degrees   External rotation 90°: [facility]  Internal rotation 0°: 30 degrees     Strength/Myotome Testing     Right Shoulder     Planes of Motion   Flexion: 4   Abduction: 4-   External rotation at 0°: 4+   Internal rotation at 0°: 4+     Tests     Right Shoulder   Positive full can, Hawkin's and Neer's  Negative apprehension, clunk, crank, drop arm, empty can, external rotation lag sign and                 Precautions: None      Manuals             PROM - stretching, joint mobs             Stretching             MRE's IR/ER                          Neuro Re-Ed                                                                                                        Ther Ex             Sidelying ER             Prone shoulder ext, abd, flexion             Tami IR                                                                              Ther Activity                                       Gait Training                                       Modalities

## 2023-05-18 ENCOUNTER — OFFICE VISIT (OUTPATIENT)
Dept: PHYSICAL THERAPY | Age: 83
End: 2023-05-18

## 2023-05-18 DIAGNOSIS — M25.511 ACUTE PAIN OF RIGHT SHOULDER: Primary | ICD-10-CM

## 2023-05-18 NOTE — PROGRESS NOTES
Daily Note     Today's date: 2023  Patient name: India Roche  :   MRN: [id number]  Referring provider: Tiffany Harrell MD  Dx:   Encounter Diagnosis     ICD-10-CM    1  Acute pain of right shoulder  M25 511                      Subjective:Patient states his R shoulder is feeling sore today but not too bad  Objective: See treatment diary below      Assessment: Tolerated treatment well, much relief from manual stretching  Some limitations with flexion and abduction today,       Plan: Continue with plan       Precautions: None      Manuals             PROM - stretching, joint mobs X 15 min            Stretching X 15 min            MRE's IR/ER                          Neuro Re-Ed                                                                 UBE  X 10 min                                       Ther Ex             Sidelying ER 2  x10             Prone shoulder ext, abd, flexion 2 x 10 each             [place] IR X 20                          Cane flexion  X 20                                                    Ther Activity                                       Gait Training                                       Modalities

## 2023-05-23 ENCOUNTER — OFFICE VISIT (OUTPATIENT)
Dept: PHYSICAL THERAPY | Age: 83
End: 2023-05-23

## 2023-05-23 DIAGNOSIS — M25.511 ACUTE PAIN OF RIGHT SHOULDER: Primary | ICD-10-CM

## 2023-05-23 NOTE — PROGRESS NOTES
Daily Note     Today's date: 2023  Patient name: Breanne Jurado  :   MRN: [id number]  Referring provider: Mercy Sheets MD  Dx:   No diagnosis found  Subjective: Patient offers no new complaints  Objective: See treatment diary below      Assessment: Tolerated treatment well  Showing some improvements  Plan: Continue to progress as tolerated        Precautions: None      Manuals            PROM - stretching, joint mobs X 15 min X 15 min                        MRE's IR/ER X 10 X 10                        Neuro Re-Ed                                                                 UBE  X 15 min  X 15 min                                     Ther Ex             Sidelying ER 2  x10  X 30           Prone shoulder ext, abd, flexion 2 x 10 each  X 30           [place] IR X 20  X 30                        Cane flexion  X 20  X 30            theraband ER/IR rows /ext  blue x 20                                      Ther Activity                                       Gait Training                                       Modalities

## 2023-05-25 ENCOUNTER — OFFICE VISIT (OUTPATIENT)
Dept: PHYSICAL THERAPY | Age: 83
End: 2023-05-25

## 2023-05-25 DIAGNOSIS — M25.511 ACUTE PAIN OF RIGHT SHOULDER: Primary | ICD-10-CM

## 2023-05-25 NOTE — PROGRESS NOTES
Daily Note     Today's date: 2023  Patient name: Adebayo Escoto  :   MRN: [id number]  Referring provider: Foster West MD  Dx:   Encounter Diagnosis     ICD-10-CM    1  Acute pain of right shoulder  M25 511                      Subjective: Patient without new complaints  Objective: See treatment diary below  Better IR noted  Assessment: Tolerated treatment well  Patient would benefit from continued PT      Plan: Continue per plan of care        Precautions: None      Manuals           PROM - stretching, joint mobs X 15 min X 15 min x30 minutes - pamela IR                       MRE's IR/ER X 10 X 10 2x12                       Neuro Re-Ed                                                                 UBE  X 15 min  X 15 min x10 minutes                                    Ther Ex             Sidelying ER 2  x10  X 30 3# - 3x10          Prone shoulder ext, abd, flexion 2 x 10 each  X 30 0# - 3x10          [place] IR X 20  X 30 3x10                       Cane flexion  X 20  X 30  compeleted          theraband ER/IR rows /ext  blue x 20  completed                                    Ther Activity                                       Gait Training                                       Modalities

## 2023-06-01 ENCOUNTER — OFFICE VISIT (OUTPATIENT)
Dept: PHYSICAL THERAPY | Age: 83
End: 2023-06-01

## 2023-06-01 DIAGNOSIS — M25.511 ACUTE PAIN OF RIGHT SHOULDER: Primary | ICD-10-CM

## 2023-06-01 NOTE — PROGRESS NOTES
Daily Note     Today's date: 2023  Patient name: Randee Read  :   MRN: [id number]  Referring provider: Gissell Mallory MD  Dx:   Encounter Diagnosis     ICD-10-CM    1  Acute pain of right shoulder  M25 511                      Subjective: Patient feeling good with R shoulder - some L shoulder discomfort today  Objective: See treatment diary below      Assessment: Tolerated treatment well  Patient would benefit from continued PT      Plan: Continue per plan of care        Precautions: None      Manuals          PROM - stretching, joint mobs X 15 min X 15 min x30 minutes - pamela IR 30 minutes                      MRE's IR/ER X 10 X 10 2x12 2x12                      Neuro Re-Ed                                                                 UBE  X 15 min  X 15 min x10 minutes completed                                   Ther Ex             Sidelying ER 2  x10  X 30 3# - 3x10 3#         Prone shoulder ext, abd, flexion 2 x 10 each  X 30 0# - 3x10 completed         [place] IR X 20  X 30 3x10 completed                      Cane flexion  X 20  X 30  compeleted 3x10         theraband ER/IR rows /ext  blue x 20  completed completed                                   Ther Activity                                       Gait Training                                       Modalities

## 2023-06-02 ENCOUNTER — APPOINTMENT (OUTPATIENT)
Dept: PHYSICAL THERAPY | Age: 83
End: 2023-06-02
Payer: MEDICARE

## 2023-06-05 ENCOUNTER — OFFICE VISIT (OUTPATIENT)
Dept: PHYSICAL THERAPY | Age: 83
End: 2023-06-05
Payer: MEDICARE

## 2023-06-05 DIAGNOSIS — M25.511 ACUTE PAIN OF RIGHT SHOULDER: Primary | ICD-10-CM

## 2023-06-05 PROBLEM — Z00.00 MEDICARE ANNUAL WELLNESS VISIT, SUBSEQUENT: Status: RESOLVED | Noted: 2022-03-22 | Resolved: 2023-06-05

## 2023-06-05 PROCEDURE — 97110 THERAPEUTIC EXERCISES: CPT | Performed by: PHYSICAL THERAPIST

## 2023-06-05 PROCEDURE — 97140 MANUAL THERAPY 1/> REGIONS: CPT | Performed by: PHYSICAL THERAPIST

## 2023-06-05 NOTE — PROGRESS NOTES
Daily Note     Today's date: 2023  Patient name: Mary Mejia  :   MRN: [id number]  Referring provider: Stacia Crump MD  Dx:   Encounter Diagnosis     ICD-10-CM    1  Acute pain of right shoulder  M25 511                      Subjective: Patient notes improvement      Objective: See treatment diary below      Assessment: Tolerated treatment well  Patient would benefit from continued PT      Plan: Continue per plan of care        Precautions: None      Manuals         PROM - stretching, joint mobs X 15 min X 15 min x30 minutes - pamela IR 30 minutes 30 minutes                     MRE's IR/ER X 10 X 10 2x12 2x12 2x12                     Neuro Re-Ed                                                                 UBE  X 15 min  X 15 min x10 minutes completed x10 minutes                                  Ther Ex             Sidelying ER 2  x10  X 30 3# - 3x10 3# 3# - 3x10        Prone shoulder ext, abd, flexion 2 x 10 each  X 30 0# - 3x10 completed 3# - 3x10        [place] IR X 20  X 30 3x10 completed 3x10        [place] rows and shld ext     3x10        Cane flexion  X 20  X 30  compeleted 3x10 3x10        theraband ER/IR rows /ext  blue x 20  completed completed completed                                  Ther Activity                                       Gait Training                                       Modalities

## 2023-06-08 ENCOUNTER — OFFICE VISIT (OUTPATIENT)
Dept: PHYSICAL THERAPY | Age: 83
End: 2023-06-08
Payer: MEDICARE

## 2023-06-08 DIAGNOSIS — M25.511 ACUTE PAIN OF RIGHT SHOULDER: Primary | ICD-10-CM

## 2023-06-08 PROCEDURE — 97110 THERAPEUTIC EXERCISES: CPT | Performed by: PHYSICAL THERAPIST

## 2023-06-08 PROCEDURE — 97140 MANUAL THERAPY 1/> REGIONS: CPT | Performed by: PHYSICAL THERAPIST

## 2023-06-13 ENCOUNTER — OFFICE VISIT (OUTPATIENT)
Dept: PHYSICAL THERAPY | Age: 83
End: 2023-06-13
Payer: MEDICARE

## 2023-06-13 DIAGNOSIS — M25.511 ACUTE PAIN OF RIGHT SHOULDER: Primary | ICD-10-CM

## 2023-06-13 PROCEDURE — 97110 THERAPEUTIC EXERCISES: CPT

## 2023-06-13 PROCEDURE — 97140 MANUAL THERAPY 1/> REGIONS: CPT

## 2023-06-13 NOTE — PROGRESS NOTES
"Daily Note     Today's date: 2023  Patient name: Heather Lui  : [birth year]  MRN: [id number]  Referring provider: Harish Krueger MD  Dx:   Encounter Diagnosis     ICD-10-CM    1  Acute pain of right shoulder  M25 511                      Subjective: Patient states 'I am feeling alright  \"      Objective: See treatment diary below      Assessment: Tolerated treatment fair  Much improvement with ROM, flexion is a bit more sore end range, other motions have improved  Plan: Continue with plan to increased strength and ROM        Precautions: None      Manuals       PROM - stretching, joint mobs X 15 min X 15 min x30 minutes - pamela IR 30 minutes 30 minutes 30 minutes GF                   MRE's IR/ER X 10 X 10 2x12 2x12 2x12 2x12 2 x 12                   Neuro Re-Ed                                                                 UBE  X 15 min  X 15 min x10 minutes completed x10 minutes x10 minutes X 10 min                                Ther Ex             Sidelying ER 2  x10  X 30 3# - 3x10 3# 3# - 3x10 completed 3# 3 x 10       Prone shoulder ext, abd, flexion 2 x 10 each  X 30 0# - 3x10 completed 3# - 3x10 completed 3# 3 x 10       [place] IR X 20  X 30 3x10 completed 3x10 completed 3 x 10       [place] rows and shld ext     3x10 completed 3 x 10       Cane flexion  X 20  X 30  compeleted 3x10 3x10 completed 3 x 10       theraband ER/IR rows /ext  blue x 20  completed completed completed completed 3 x 10                                 Ther Activity                                       Gait Training                                       Modalities                                            "

## 2023-06-16 ENCOUNTER — OFFICE VISIT (OUTPATIENT)
Dept: PHYSICAL THERAPY | Age: 83
End: 2023-06-16
Payer: MEDICARE

## 2023-06-16 DIAGNOSIS — M25.511 ACUTE PAIN OF RIGHT SHOULDER: Primary | ICD-10-CM

## 2023-06-16 PROCEDURE — 97110 THERAPEUTIC EXERCISES: CPT

## 2023-06-16 PROCEDURE — 97140 MANUAL THERAPY 1/> REGIONS: CPT

## 2023-06-16 NOTE — PROGRESS NOTES
Daily Note     Today's date: 2023  Patient name: Matilde Stern  :   MRN: [id number]  Referring provider: Graciela Garner MD  Dx:   Encounter Diagnosis     ICD-10-CM    1  Acute pain of right shoulder  M25 511                      Subjective: Patient states I have a few more weeks to go yet for my shoulder  Just some soreness  Objective: See treatment diary below      Assessment: Tolerated treatment well  Slowly able to increase weight, however L shoulder feeling sore after the weights  Plan: Continue with plan       Precautions: None      Manuals      PROM - stretching, joint mobs X 15 min X 15 min x30 minutes - pamela IR 30 minutes 30 minutes 30 minutes GF                   MRE's IR/ER X 10 X 10 2x12 2x12 2x12 2x12 2 x 12 2 x12                  Neuro Re-Ed                                                                 UBE  X 15 min  X 15 min x10 minutes completed x10 minutes x10 minutes X 10 min X 10 min                               Ther Ex             Sidelying ER 2  x10  X 30 3# - 3x10 3# 3# - 3x10 completed 3# 3 x 10  3# 3 x 10      Prone shoulder ext, abd, flexion 2 x 10 each  X 30 0# - 3x10 completed 3# - 3x10 completed 3# 3 x 10  3# 3 x 10      [place] IR X 20  X 30 3x10 completed 3x10 completed 3 x 10  3 x 10      [place] rows and shld ext     3x10 completed 3 x 10  3 x 10      Cane flexion  X 20  X 30  compeleted 3x10 3x10 completed 3 x 10  3 x 10      theraband ER/IR rows /ext  blue x 20  completed completed completed completed 3 x 10  3 x 10                                Ther Activity                                       Gait Training                                       Modalities

## 2023-06-19 ENCOUNTER — OFFICE VISIT (OUTPATIENT)
Dept: PHYSICAL THERAPY | Age: 83
End: 2023-06-19
Payer: MEDICARE

## 2023-06-19 DIAGNOSIS — M25.511 ACUTE PAIN OF RIGHT SHOULDER: Primary | ICD-10-CM

## 2023-06-19 PROCEDURE — 97140 MANUAL THERAPY 1/> REGIONS: CPT | Performed by: PHYSICAL THERAPIST

## 2023-06-19 PROCEDURE — 97110 THERAPEUTIC EXERCISES: CPT | Performed by: PHYSICAL THERAPIST

## 2023-06-19 NOTE — PROGRESS NOTES
Daily Note     Today's date: 2023  Patient name: Christina Fritz  : [birth year]  MRN: [id number]  Referring provider: Charleen Hodges MD  Dx:   Encounter Diagnosis     ICD-10-CM    1  Acute pain of right shoulder  M25 511                      Subjective: Some increased sx's on L shoulder noted  Objective: See treatment diary below  Assessment: Tolerated treatment well  Patient would benefit from continued PT      Plan: Continue per plan of care        Precautions: None      Manuals     PROM - stretching, joint mobs X 15 min X 15 min x30 minutes - pamela IR 30 minutes 30 minutes 30 minutes GF  To L shoulder today                 MRE's IR/ER X 10 X 10 2x12 2x12 2x12 2x12 2 x 12 2 x12 2x12                 Neuro Re-Ed                                                                 UBE  X 15 min  X 15 min x10 minutes completed x10 minutes x10 minutes X 10 min X 10 min x10 minutes                              Ther Ex             Sidelying ER 2  x10  X 30 3# - 3x10 3# 3# - 3x10 completed 3# 3 x 10  3# 3 x 10  3# - 3x10 - b/l    Prone shoulder ext, abd, flexion 2 x 10 each  X 30 0# - 3x10 completed 3# - 3x10 completed 3# 3 x 10  3# 3 x 10  2# - 2x10 - b/l    [place] IR X 20  X 30 3x10 completed 3x10 completed 3 x 10  3 x 10  3x10  - b/l    [place] rows and shld ext     3x10 completed 3 x 10  3 x 10  3x10    Cane flexion  X 20  X 30  compeleted 3x10 3x10 completed 3 x 10  3 x 10  3x10    theraband ER/IR rows /ext  blue x 20  completed completed completed completed 3 x 10  3 x 10  nt                              Ther Activity                                       Gait Training                                       Modalities

## 2023-06-23 ENCOUNTER — OFFICE VISIT (OUTPATIENT)
Dept: PHYSICAL THERAPY | Age: 83
End: 2023-06-23
Payer: MEDICARE

## 2023-06-23 DIAGNOSIS — M25.511 ACUTE PAIN OF RIGHT SHOULDER: Primary | ICD-10-CM

## 2023-06-23 PROCEDURE — 97140 MANUAL THERAPY 1/> REGIONS: CPT

## 2023-06-23 PROCEDURE — 97110 THERAPEUTIC EXERCISES: CPT

## 2023-06-23 NOTE — PROGRESS NOTES
Daily Note     Today's date: 2023  Patient name: Chapito Norris  :   MRN: [id number]  Referring provider: Torsten Madison MD  Dx:   Encounter Diagnosis     ICD-10-CM    1  Acute pain of right shoulder  M25 511                      Subjective: Patient states he is not having pain , but feeling some limitations especially in L shoulder  Objective: See treatment diary below  Assessment: Tolerated treatment well without increased symptoms  Much relief from PROM to B shoulders today  Plan: Monitor B shoulder next session       Precautions: None      Manuals    PROM - stretching, joint mobs X 15 min X 15 min x30 minutes - pamela IR 30 minutes 30 minutes 30 minutes GF  To L shoulder today B shoulders today                MRE's IR/ER X 10 X 10 2x12 2x12 2x12 2x12 2 x 12 2 x12 2x12 2 x 12 B                Neuro Re-Ed                                                                 UBE  X 15 min  X 15 min x10 minutes completed x10 minutes x10 minutes X 10 min X 10 min x10 minutes X 10 min                              Ther Ex             Sidelying ER 2  x10  X 30 3# - 3x10 3# 3# - 3x10 completed 3# 3 x 10  3# 3 x 10  3# - 3x10 - b/l 3# 3 x 10 B   Prone shoulder ext, abd, flexion 2 x 10 each  X 30 0# - 3x10 completed 3# - 3x10 completed 3# 3 x 10  3# 3 x 10  2# - 2x10 - b/l 2# 3 x 10 B   [place] IR X 20  X 30 3x10 completed 3x10 completed 3 x 10  3 x 10  3x10  - b/l 3 x 10 B   [place] rows and shld ext     3x10 completed 3 x 10  3 x 10  3x10 3 x 10    Cane flexion  X 20  X 30  compeleted 3x10 3x10 completed 3 x 10  3 x 10  3x10 3 x 10   theraband ER/IR rows /ext  blue x 20  completed completed completed completed 3 x 10  3 x 10  nt 3 x 10 Black                             Ther Activity                                       Gait Training                                       Modalities

## 2023-06-28 ENCOUNTER — OFFICE VISIT (OUTPATIENT)
Dept: PHYSICAL THERAPY | Age: 83
End: 2023-06-28
Payer: MEDICARE

## 2023-06-28 DIAGNOSIS — M25.511 ACUTE PAIN OF RIGHT SHOULDER: Primary | ICD-10-CM

## 2023-06-28 PROCEDURE — 97110 THERAPEUTIC EXERCISES: CPT | Performed by: PHYSICAL THERAPIST

## 2023-06-28 PROCEDURE — 97140 MANUAL THERAPY 1/> REGIONS: CPT | Performed by: PHYSICAL THERAPIST

## 2023-06-28 NOTE — PROGRESS NOTES
Daily Note     Today's date: 2023  Patient name: Francisco Randolph  :   MRN: [id number]  Referring provider: Leander Vizcaino MD  Dx:   Encounter Diagnosis     ICD-10-CM    1  Acute pain of right shoulder  M25 511                      Subjective: Patient continues to c/o L shoulder discomfort with straight abduction movements  Objective: See treatment diary below      Assessment: Tolerated treatment well  Patient would benefit from continued PT      Plan: Continue per plan of care        Precautions: None      Manuals    PROM - stretching, joint mobs X 15 min X 15 min x30 minutes - pamela IR 30 minutes 30 minutes 30 minutes GF  To L shoulder today B shoulders today                MRE's IR/ER X 10 X 10 2x12 2x12 2x12 2x12 2 x 12 2 x12 2x12 2 x 12 B                Neuro Re-Ed                                                                 UBE  X 15 min  X 15 min x10 minutes completed x10 minutes x10 minutes X 10 min X 10 min x10 minutes X 10 min                              Ther Ex             Sidelying ER 2  x10  X 30 3# - 3x10 3# 3# - 3x10 completed 3# 3 x 10  3# 3 x 10  3# - 3x10 - b/l 3# 3 x 10 B   Prone shoulder ext, abd, flexion 2 x 10 each  X 30 0# - 3x10 completed 3# - 3x10 completed 3# 3 x 10  3# 3 x 10  2# - 2x10 - b/l 2# 3 x 10 B   [place] IR X 20  X 30 3x10 completed 3x10 completed 3 x 10  3 x 10  3x10  - b/l 3 x 10 B   [place] rows and shld ext     3x10 completed 3 x 10  3 x 10  3x10 3 x 10    Cane flexion  X 20  X 30  compeleted 3x10 3x10 completed 3 x 10  3 x 10  3x10 3 x 10   theraband ER/IR rows /ext  blue x 20  completed completed completed completed 3 x 10  3 x 10  nt 3 x 10 Black                             Ther Activity                                       Gait Training                                       Modalities

## 2023-06-30 ENCOUNTER — OFFICE VISIT (OUTPATIENT)
Dept: PHYSICAL THERAPY | Age: 83
End: 2023-06-30
Payer: MEDICARE

## 2023-06-30 DIAGNOSIS — M25.511 ACUTE PAIN OF RIGHT SHOULDER: Primary | ICD-10-CM

## 2023-06-30 PROCEDURE — 97140 MANUAL THERAPY 1/> REGIONS: CPT | Performed by: PHYSICAL THERAPIST

## 2023-06-30 PROCEDURE — 97110 THERAPEUTIC EXERCISES: CPT | Performed by: PHYSICAL THERAPIST

## 2023-06-30 NOTE — PROGRESS NOTES
Daily Note     Today's date: 2023  Patient name: Stefany Pizarro  :   MRN: [id number]  Referring provider: Komal Zamora MD  Dx:   Encounter Diagnosis     ICD-10-CM    1  Acute pain of right shoulder  M25 511                      Subjective: Patient feeling better and understands HEP  Objective: See treatment diary below      Assessment: Tolerated treatment well   Patient would benefit from continued PT      Plan: D/C to HEP     Precautions: None      Manuals    PROM - stretching, joint mobs X 15 min X 15 min x30 minutes - pamela IR 30 minutes 30 minutes 30 minutes GF  To L shoulder today B shoulders today                MRE's IR/ER X 10 X 10 2x12 2x12 2x12 2x12 2 x 12 2 x12 2x12 2 x 12 B                Neuro Re-Ed                                                                 UBE  X 15 min  X 15 min x10 minutes completed x10 minutes x10 minutes X 10 min X 10 min x10 minutes X 10 min                              Ther Ex             Sidelying ER 2  x10  X 30 3# - 3x10 3# 3# - 3x10 completed 3# 3 x 10  3# 3 x 10  3# - 3x10 - b/l 3# 3 x 10 B   Prone shoulder ext, abd, flexion 2 x 10 each  X 30 0# - 3x10 completed 3# - 3x10 completed 3# 3 x 10  3# 3 x 10  2# - 2x10 - b/l 2# 3 x 10 B   Tami IR X 20  X 30 3x10 completed 3x10 completed 3 x 10  3 x 10  3x10  - b/l 3 x 10 B   Tami rows and shld ext     3x10 completed 3 x 10  3 x 10  3x10 3 x 10    Cane flexion  X 20  X 30  compeleted 3x10 3x10 completed 3 x 10  3 x 10  3x10 3 x 10   theraband ER/IR rows /ext  blue x 20  completed completed completed completed 3 x 10  3 x 10  nt 3 x 10 Black                             Ther Activity                                       Gait Training                                       Modalities

## 2023-08-21 DIAGNOSIS — I10 ESSENTIAL HYPERTENSION: ICD-10-CM

## 2023-08-21 RX ORDER — LISINOPRIL 5 MG/1
5 TABLET ORAL DAILY
Qty: 90 TABLET | Refills: 3 | OUTPATIENT
Start: 2023-08-21

## 2023-09-14 ENCOUNTER — TELEPHONE (OUTPATIENT)
Age: 83
End: 2023-09-14

## 2023-09-14 NOTE — TELEPHONE ENCOUNTER
Patient came into the office and needed prescription refill for lisinopril. Patient wanted to know if you think he should be decreased down to 2.5mg instead of 5 mg due to recent weight loss.     Patient gave recent BP readings- scanned into chart in media tab 9/14.    Patient has enough for 2 more weeks, but not enough until next appt on 10/10..    Please advise if dosage should change. Patient prefers [facility]    Patient is aware you are out of office.      Thank you

## 2023-09-19 DIAGNOSIS — I10 ESSENTIAL HYPERTENSION: ICD-10-CM

## 2023-09-19 RX ORDER — LISINOPRIL 2.5 MG/1
5 TABLET ORAL DAILY
Qty: 90 TABLET | Refills: 1 | Status: SHIPPED | OUTPATIENT
Start: 2023-09-19

## 2023-10-03 ENCOUNTER — RA CDI HCC (OUTPATIENT)
Dept: OTHER | Facility: HOSPITAL | Age: 83
End: 2023-10-03

## 2023-10-03 ENCOUNTER — APPOINTMENT (OUTPATIENT)
Dept: LAB | Age: 83
End: 2023-10-03
Payer: MEDICARE

## 2023-10-03 DIAGNOSIS — K21.9 GASTROESOPHAGEAL REFLUX DISEASE WITHOUT ESOPHAGITIS: ICD-10-CM

## 2023-10-03 DIAGNOSIS — M15.9 PRIMARY OSTEOARTHRITIS INVOLVING MULTIPLE JOINTS: ICD-10-CM

## 2023-10-03 DIAGNOSIS — E78.2 MIXED HYPERLIPIDEMIA: ICD-10-CM

## 2023-10-03 DIAGNOSIS — E66.3 OVERWEIGHT WITH BODY MASS INDEX (BMI) OF 28 TO 28.9 IN ADULT: ICD-10-CM

## 2023-10-03 DIAGNOSIS — I10 ESSENTIAL HYPERTENSION: ICD-10-CM

## 2023-10-03 LAB
ALBUMIN SERPL BCP-MCNC: 4.2 G/DL (ref 3.5–5)
ALP SERPL-CCNC: 36 U/L (ref 34–104)
ALT SERPL W P-5'-P-CCNC: 15 U/L (ref 7–52)
ANION GAP SERPL CALCULATED.3IONS-SCNC: 7 MMOL/L
AST SERPL W P-5'-P-CCNC: 18 U/L (ref 13–39)
BASOPHILS # BLD AUTO: 0.05 THOUSANDS/ÂΜL (ref 0–0.1)
BASOPHILS NFR BLD AUTO: 1 % (ref 0–1)
BILIRUB SERPL-MCNC: 1.05 MG/DL (ref 0.2–1)
BUN SERPL-MCNC: 16 MG/DL (ref 5–25)
CALCIUM SERPL-MCNC: 9.4 MG/DL (ref 8.4–10.2)
CHLORIDE SERPL-SCNC: 104 MMOL/L (ref 96–108)
CHOLEST SERPL-MCNC: 185 MG/DL
CO2 SERPL-SCNC: 26 MMOL/L (ref 21–32)
CREAT SERPL-MCNC: 0.85 MG/DL (ref 0.6–1.3)
EOSINOPHIL # BLD AUTO: 0.16 THOUSAND/ÂΜL (ref 0–0.61)
EOSINOPHIL NFR BLD AUTO: 3 % (ref 0–6)
ERYTHROCYTE [DISTWIDTH] IN BLOOD BY AUTOMATED COUNT: 12.4 % (ref 11.6–15.1)
GFR SERPL CREATININE-BSD FRML MDRD: 81 ML/MIN/1.73SQ M
GLUCOSE P FAST SERPL-MCNC: 87 MG/DL (ref 65–99)
HCT VFR BLD AUTO: 41.9 % (ref 36.5–49.3)
HDLC SERPL-MCNC: 66 MG/DL
HGB BLD-MCNC: 14 G/DL (ref 12–17)
IMM GRANULOCYTES # BLD AUTO: 0.03 THOUSAND/UL (ref 0–0.2)
IMM GRANULOCYTES NFR BLD AUTO: 1 % (ref 0–2)
LDLC SERPL CALC-MCNC: 108 MG/DL (ref 0–100)
LYMPHOCYTES # BLD AUTO: 1.65 THOUSANDS/ÂΜL (ref 0.6–4.47)
LYMPHOCYTES NFR BLD AUTO: 31 % (ref 14–44)
MCH RBC QN AUTO: 34.2 PG (ref 26.8–34.3)
MCHC RBC AUTO-ENTMCNC: 33.4 G/DL (ref 31.4–37.4)
MCV RBC AUTO: 102 FL (ref 82–98)
MONOCYTES # BLD AUTO: 0.63 THOUSAND/ÂΜL (ref 0.17–1.22)
MONOCYTES NFR BLD AUTO: 12 % (ref 4–12)
NEUTROPHILS # BLD AUTO: 2.84 THOUSANDS/ÂΜL (ref 1.85–7.62)
NEUTS SEG NFR BLD AUTO: 52 % (ref 43–75)
NONHDLC SERPL-MCNC: 119 MG/DL
NRBC BLD AUTO-RTO: 0 /100 WBCS
PLATELET # BLD AUTO: 123 THOUSANDS/UL (ref 149–390)
PMV BLD AUTO: 13 FL (ref 8.9–12.7)
POTASSIUM SERPL-SCNC: 4.1 MMOL/L (ref 3.5–5.3)
PROT SERPL-MCNC: 6.8 G/DL (ref 6.4–8.4)
RBC # BLD AUTO: 4.09 MILLION/UL (ref 3.88–5.62)
SODIUM SERPL-SCNC: 137 MMOL/L (ref 135–147)
TRIGL SERPL-MCNC: 56 MG/DL
WBC # BLD AUTO: 5.36 THOUSAND/UL (ref 4.31–10.16)

## 2023-10-03 PROCEDURE — 36415 COLL VENOUS BLD VENIPUNCTURE: CPT

## 2023-10-03 PROCEDURE — 80061 LIPID PANEL: CPT

## 2023-10-03 PROCEDURE — 85025 COMPLETE CBC W/AUTO DIFF WBC: CPT

## 2023-10-03 PROCEDURE — 80053 COMPREHEN METABOLIC PANEL: CPT

## 2023-10-10 ENCOUNTER — OFFICE VISIT (OUTPATIENT)
Age: 83
End: 2023-10-10
Payer: MEDICARE

## 2023-10-10 VITALS
OXYGEN SATURATION: 98 % | HEART RATE: 69 BPM | TEMPERATURE: 97 F | BODY MASS INDEX: 27.62 KG/M2 | DIASTOLIC BLOOD PRESSURE: 72 MMHG | WEIGHT: 215.2 LBS | HEIGHT: 74 IN | SYSTOLIC BLOOD PRESSURE: 110 MMHG

## 2023-10-10 DIAGNOSIS — K21.9 GASTROESOPHAGEAL REFLUX DISEASE WITHOUT ESOPHAGITIS: ICD-10-CM

## 2023-10-10 DIAGNOSIS — Z23 NEED FOR INFLUENZA VACCINATION: ICD-10-CM

## 2023-10-10 DIAGNOSIS — E78.2 MIXED HYPERLIPIDEMIA: Primary | ICD-10-CM

## 2023-10-10 DIAGNOSIS — I10 ESSENTIAL HYPERTENSION: ICD-10-CM

## 2023-10-10 DIAGNOSIS — D69.6 THROMBOCYTOPENIA (HCC): ICD-10-CM

## 2023-10-10 DIAGNOSIS — M15.9 PRIMARY OSTEOARTHRITIS INVOLVING MULTIPLE JOINTS: ICD-10-CM

## 2023-10-10 PROCEDURE — 90662 IIV NO PRSV INCREASED AG IM: CPT

## 2023-10-10 PROCEDURE — G0008 ADMIN INFLUENZA VIRUS VAC: HCPCS

## 2023-10-10 PROCEDURE — 99214 OFFICE O/P EST MOD 30 MIN: CPT | Performed by: INTERNAL MEDICINE

## 2023-10-10 NOTE — PROGRESS NOTES
Name: Cathern Boxer      : [birth year]      MRN: [id number]  Encounter Provider: Paras Fung MD  Encounter Date: 10/10/2023   Encounter department: [facility]     1. Mixed hyperlipidemia  Assessment & Plan:  Near goal levels. LDL cholesterol 108 with no history of coronary artery disease    Orders:  -     CBC and differential; Future; Expected date: 2024  -     Comprehensive metabolic panel; Future; Expected date: 2024  -     Lipid panel; Future; Expected date: 2024    2. Essential hypertension  Assessment & Plan:  Blood pressure is nicely controlled on the minimal dose of lisinopril    Orders:  -     CBC and differential; Future; Expected date: 2024  -     Comprehensive metabolic panel; Future; Expected date: 2024  -     Lipid panel; Future; Expected date: 2024    3. Primary osteoarthritis involving multiple joints  Assessment & Plan:  Mildly symptomatic. Patient is on multiple homeopathic supplements which appears to help with his joint mobility and he exercises regularly. 4. Gastroesophageal reflux disease without esophagitis  Assessment & Plan:  Relatively asymptomatic      5. Thrombocytopenia ([address])  Assessment & Plan:  Some minor ecchymoses but no bleeding tendencies    Orders:  -     CBC and differential; Future; Expected date: 2024         Subjective      Patient presents to the office for a follow up visit. No particular complaints at this time. Labs were reviewed. Lipids are at goal levels. CMP is unremarkable except for minimally elevated serum bilirubin. CBC essentially normal with only slight increase in MCV and platelet count 237,941. Review of Systems   Constitutional: Negative. Negative for activity change, appetite change, chills, diaphoresis, fatigue, fever and unexpected weight change. HENT: Negative. Eyes: Negative. Respiratory: Negative.     Cardiovascular: Negative. Gastrointestinal: Negative. Endocrine: Negative. Genitourinary: Negative. Musculoskeletal: Positive for arthralgias (multiple joints). Skin: Negative. Neurological: Negative. Hematological: Negative. Psychiatric/Behavioral: The patient is not nervous/anxious. Current Outpatient Medications on File Prior to Visit   Medication Sig   • acetaminophen (TYLENOL) 650 mg CR tablet Take 1,300 mg by mouth in the morning   • aspirin 81 mg chewable tablet Chew 81 mg daily   • Calcium Citrate-Vitamin D 315-250 MG-UNIT TABS Take by mouth   • Green Tea, Joy sinensis, (GREEN TEA EXTRACT PO) Take by mouth 1 dropper daily   • lisinopril (ZESTRIL) 2.5 mg tablet Take 2 tablets (5 mg total) by mouth daily (Patient taking differently: Take 2.5 mg by mouth daily)   • Lutein 10 MG TABS Take 10 mg by mouth daily    • Misc Natural Products (CHOLESTEROL SUPPORT PO) Take by mouth Cholestacare 800 mg  1 tab every other day   • Misc Natural Products (LEG VEIN & CIRCULATION PO) Take by mouth Circulation Vein Support 2 tabs daily   • Misc Natural Products (PROSTATE SUPPORT PO) Take 1 tablet by mouth in the morning   • Multiple Vitamin (MULTIVITAMIN) tablet Take 1 tablet by mouth daily   • Multiple Vitamins-Minerals (EYE SUPPORT PO) Take by mouth Ultimate Eye Support 12 mg Lutein, 6 mg Zerxanthin, 4 mg  Astaxathin  2 tabs daily   • NON FORMULARY GOLO Nutrition Supplement 1 capsule daily   • Omega-3 Fatty Acids (OMEGA-3 FISH OIL) 1200 MG CAPS Take 2 capsules by mouth in the morning   • TURMERIC PO Take 1 tablet by mouth daily        Objective     /72 (BP Location: Left arm, Patient Position: Sitting, Cuff Size: Standard)   Pulse 69   Temp (!) 97 °F (36.1 °C) (Tympanic)   Ht 6' 1.5" (1.867 m)   Wt 97.6 kg (215 lb 3.2 oz)   SpO2 98%   BMI 28.00 kg/m²     Physical Exam  Vitals reviewed. Constitutional:       General: He is not in acute distress. Appearance: Normal appearance.  He is normal weight. He is not ill-appearing, toxic-appearing or diaphoretic. HENT:      Head: Normocephalic and atraumatic. Right Ear: External ear normal.      Left Ear: External ear normal.      Nose: Nose normal.   Eyes:      General: No scleral icterus. Conjunctiva/sclera: Conjunctivae normal.      Pupils: Pupils are equal, round, and reactive to light. Neck:      Vascular: No carotid bruit or JVD. Trachea: No tracheal deviation. Cardiovascular:      Rate and Rhythm: Normal rate and regular rhythm. Heart sounds: Normal heart sounds. No murmur heard. Pulmonary:      Effort: Pulmonary effort is normal. No respiratory distress. Breath sounds: Normal breath sounds. No rales. Abdominal:      General: Abdomen is flat. There is no distension. Musculoskeletal:         General: No swelling. Cervical back: Neck supple. Right lower leg: No edema. Left lower leg: No edema. Skin:     General: Skin is warm. Coloration: Skin is not jaundiced. Findings: No bruising, erythema or rash. Neurological:      General: No focal deficit present. Mental Status: He is alert and oriented to person, place, and time. Mental status is at baseline.    Psychiatric:         Mood and Affect: Mood normal.         Behavior: Behavior normal.       Delmi Lo MD

## 2023-10-10 NOTE — ASSESSMENT & PLAN NOTE
Mildly symptomatic. Patient is on multiple homeopathic supplements which appears to help with his joint mobility and he exercises regularly.

## 2024-03-21 DIAGNOSIS — I10 ESSENTIAL HYPERTENSION: ICD-10-CM

## 2024-03-21 RX ORDER — LISINOPRIL 2.5 MG/1
2.5 TABLET ORAL DAILY
Qty: 90 TABLET | Refills: 1 | Status: SHIPPED | OUTPATIENT
Start: 2024-03-21

## 2024-03-21 RX ORDER — LISINOPRIL 2.5 MG/1
5 TABLET ORAL DAILY
Qty: 180 TABLET | Refills: 3 | OUTPATIENT
Start: 2024-03-21

## 2024-05-13 ENCOUNTER — TELEPHONE (OUTPATIENT)
Age: 84
End: 2024-05-13

## 2024-05-13 NOTE — TELEPHONE ENCOUNTER
Pt needed to reschedule appt. Pt requesting lab order date to be changed. Pt stated he will be going to [facility] around 7/24/24 for his appt on 8/1/24. Thank you.

## 2024-05-13 NOTE — TELEPHONE ENCOUNTER
Left message on machine Patient can have labs completed anytime. No dates need to be changed at this time.

## 2024-06-07 ENCOUNTER — HOSPITAL ENCOUNTER (EMERGENCY)
Facility: HOSPITAL | Age: 84
Discharge: HOME | End: 2024-06-07
Attending: EMERGENCY MEDICINE
Payer: MEDICARE

## 2024-06-07 ENCOUNTER — APPOINTMENT (EMERGENCY)
Dept: RADIOLOGY | Facility: HOSPITAL | Age: 84
End: 2024-06-07
Payer: MEDICARE

## 2024-06-07 VITALS
SYSTOLIC BLOOD PRESSURE: 146 MMHG | RESPIRATION RATE: 18 BRPM | TEMPERATURE: 98.5 F | HEART RATE: 59 BPM | DIASTOLIC BLOOD PRESSURE: 72 MMHG | OXYGEN SATURATION: 98 %

## 2024-06-07 DIAGNOSIS — R42 LIGHTHEADEDNESS: Primary | ICD-10-CM

## 2024-06-07 LAB
ANION GAP SERPL CALCULATED.3IONS-SCNC: 6 MMOL/L (ref 4–13)
ATRIAL RATE: 58 BPM
BUN SERPL-MCNC: 16 MG/DL (ref 5–25)
CALCIUM SERPL-MCNC: 8.8 MG/DL (ref 8.4–10.2)
CARDIAC TROPONIN I PNL SERPL HS: 4 NG/L
CHLORIDE SERPL-SCNC: 105 MMOL/L (ref 96–108)
CO2 SERPL-SCNC: 26 MMOL/L (ref 21–32)
CREAT SERPL-MCNC: 0.69 MG/DL (ref 0.6–1.3)
GFR SERPL CREATININE-BSD FRML MDRD: 89 ML/MIN/1.73SQ M
GLUCOSE SERPL-MCNC: 88 MG/DL (ref 65–140)
P AXIS: 45 DEGREES
POTASSIUM SERPL-SCNC: 4 MMOL/L (ref 3.5–5.3)
PR INTERVAL: 184 MS
QRS AXIS: -43 DEGREES
QRSD INTERVAL: 102 MS
QT INTERVAL: 410 MS
QTC INTERVAL: 402 MS
SODIUM SERPL-SCNC: 137 MMOL/L (ref 135–147)
T WAVE AXIS: 18 DEGREES
VENTRICULAR RATE: 58 BPM

## 2024-06-07 PROCEDURE — 93005 ELECTROCARDIOGRAM TRACING: CPT

## 2024-06-07 PROCEDURE — 84484 ASSAY OF TROPONIN QUANT: CPT | Performed by: EMERGENCY MEDICINE

## 2024-06-07 PROCEDURE — 99284 EMERGENCY DEPT VISIT MOD MDM: CPT

## 2024-06-07 PROCEDURE — 80048 BASIC METABOLIC PNL TOTAL CA: CPT | Performed by: EMERGENCY MEDICINE

## 2024-06-07 PROCEDURE — 71045 X-RAY EXAM CHEST 1 VIEW: CPT

## 2024-06-07 PROCEDURE — 99285 EMERGENCY DEPT VISIT HI MDM: CPT | Performed by: EMERGENCY MEDICINE

## 2024-06-07 PROCEDURE — 93010 ELECTROCARDIOGRAM REPORT: CPT | Performed by: INTERNAL MEDICINE

## 2024-06-07 PROCEDURE — 36415 COLL VENOUS BLD VENIPUNCTURE: CPT | Performed by: EMERGENCY MEDICINE

## 2024-06-07 NOTE — ED PROVIDER NOTES
History  Chief Complaint   Patient presents with    Dizziness     Pt has been dizzy and SOB for approx a week and a half since pt has been moving & packing boxes. Pt got bloodwork, urine, & EKG [facility] Results were brought in by pt. Pt states he was sent here because they could not pinpoint cause. Pt believes it is from not being in his normal routine & overworking self.      Patient is an 83-year-old male with a history of hypertension who presents with lightheadedness.  Patient states that he has had intermittent, brief episodes of lightheadedness over the past week.  He states that it is mainly when he bends down or stands up.  He states that the symptoms improve quickly and he denies associated chest pain, shortness of breath, nausea, diaphoresis, other concerns.  He admits that he has been very active over the past week.  He is currently packing up his house and moving into an apartment with his wife.  He has been trying to stay hydrated and has had a normal appetite.  He denies loss of consciousness, focal weakness, numbness, tingling or other concerns.      History provided by:  Patient  Dizziness  Quality:  Lightheadedness  Duration:  1 week  Timing:  Intermittent  Chronicity:  New  Context: bending over    Associated symptoms: no chest pain, no diarrhea, no headaches, no nausea, no palpitations, no shortness of breath and no vomiting        Prior to Admission Medications   Prescriptions Last Dose Informant Patient Reported? Taking?   Calcium Citrate-Vitamin D 315-250 MG-UNIT TABS  Self Yes No   Sig: Take by mouth   Green Tea, Joy sinensis, (GREEN TEA EXTRACT PO)  Self Yes No   Sig: Take by mouth 1 dropper daily   Lutein 10 MG TABS  Self Yes No   Sig: Take 10 mg by mouth daily    Misc Natural Products (CHOLESTEROL SUPPORT PO)  Self Yes No   Sig: Take by mouth Cholestacare 800 mg  1 tab every other day   Misc Natural Products (LEG VEIN & CIRCULATION PO)  Self Yes No   Sig: Take  by mouth Circulation Vein Support 2 tabs daily   Misc Natural Products (PROSTATE SUPPORT PO)  Self Yes No   Sig: Take 1 tablet by mouth in the morning   Multiple Vitamin (MULTIVITAMIN) tablet  Self Yes No   Sig: Take 1 tablet by mouth daily   Multiple Vitamins-Minerals (EYE SUPPORT PO)  Self Yes No   Sig: Take by mouth Ultimate Eye Support 12 mg Lutein, 6 mg Zerxanthin, 4 mg  Astaxathin  2 tabs daily   NON FORMULARY   Yes No   Sig: GOLO Nutrition Supplement 1 capsule daily   Omega-3 Fatty Acids (OMEGA-3 FISH OIL) 1200 MG CAPS  Self Yes No   Sig: Take 2 capsules by mouth in the morning   TURMERIC PO  Self Yes No   Sig: Take 1 tablet by mouth daily    acetaminophen (TYLENOL) 650 mg CR tablet   Yes No   Sig: Take 1,300 mg by mouth in the morning   aspirin 81 mg chewable tablet  Self Yes No   Sig: Chew 81 mg daily   lisinopril (ZESTRIL) 2.5 mg tablet   No No   Sig: Take 1 tablet (2.5 mg total) by mouth daily      Facility-Administered Medications: None       Past Medical History:   Diagnosis Date    Back ache     Facial basal cell cancer     HLD (hyperlipidemia)     Hypertension     Obesity     Post-nasal drip 5/10/2022    Transient cerebral ischemia        Past Surgical History:   Procedure Laterality Date    COLONOSCOPY  2018       Family History   Problem Relation Age of Onset    No Known Problems Mother     No Known Problems Father      I have reviewed and agree with the history as documented.    E-Cigarette/Vaping    E-Cigarette Use Never User      E-Cigarette/Vaping Substances    Nicotine No     THC No     CBD No     Flavoring No     Other No     Unknown No      Social History     Tobacco Use    Smoking status: Never    Smokeless tobacco: Never   Vaping Use    Vaping status: Never Used   Substance Use Topics    Alcohol use: Yes     Alcohol/week: 3.0 standard drinks of alcohol     Types: 3 Standard drinks or equivalent per week    Drug use: Never       Review of Systems   Constitutional:  Negative for chills,  diaphoresis and fever.   HENT:  Negative for nosebleeds, sore throat and trouble swallowing.    Eyes:  Negative for photophobia, pain and visual disturbance.   Respiratory:  Negative for cough, chest tightness and shortness of breath.    Cardiovascular:  Negative for chest pain, palpitations and leg swelling.   Gastrointestinal:  Negative for abdominal pain, constipation, diarrhea, nausea and vomiting.   Endocrine: Negative for polydipsia and polyuria.   Genitourinary:  Negative for difficulty urinating, dysuria and hematuria.   Musculoskeletal:  Negative for back pain, neck pain and neck stiffness.   Skin:  Negative for pallor and rash.   Neurological:  Positive for light-headedness. Negative for syncope and headaches.   All other systems reviewed and are negative.      Physical Exam  Physical Exam  Vitals and nursing note reviewed.   Constitutional:       General: He is not in acute distress.     Appearance: He is well-developed.   HENT:      Head: Normocephalic and atraumatic.      Mouth/Throat:      Mouth: Oropharynx is clear and moist and mucous membranes are normal.   Eyes:      Extraocular Movements: EOM normal.      Pupils: Pupils are equal, round, and reactive to light.   Cardiovascular:      Rate and Rhythm: Normal rate and regular rhythm.      Pulses: Normal pulses.      Heart sounds: Normal heart sounds.   Pulmonary:      Effort: Pulmonary effort is normal. No respiratory distress.      Breath sounds: Normal breath sounds.   Abdominal:      General: There is no distension.      Palpations: Abdomen is soft. Abdomen is not rigid.      Tenderness: There is no abdominal tenderness. There is no guarding or rebound.   Musculoskeletal:         General: No tenderness or edema. Normal range of motion.      Cervical back: Normal range of motion and neck supple.   Lymphadenopathy:      Cervical: No cervical adenopathy.   Skin:     General: Skin is warm and dry.      Capillary Refill: Capillary refill takes less  than 2 seconds.   Neurological:      Mental Status: He is alert and oriented to person, place, and time.      Cranial Nerves: No cranial nerve deficit.      Sensory: No sensory deficit.      Motor: Motor function is intact.      Coordination: Coordination is intact.   Psychiatric:         Mood and Affect: Mood and affect normal.         Vital Signs  ED Triage Vitals   Temperature Pulse Respirations Blood Pressure SpO2   06/07/24 1121 06/07/24 1115 06/07/24 1115 06/07/24 1115 06/07/24 1115   98.5 °F (36.9 °C) 61 20 (!) 180/87 96 %      Temp Source Heart Rate Source Patient Position - Orthostatic VS BP Location FiO2 (%)   06/07/24 1121 06/07/24 1115 06/07/24 1115 06/07/24 1115 --   Oral Monitor Sitting Right arm       Pain Score       --                  Vitals:    06/07/24 1130 06/07/24 1200 06/07/24 1230 06/07/24 1300   BP: 144/65 143/70 160/77 146/72   Pulse: 63 61 67 59   Patient Position - Orthostatic VS:             Visual Acuity      ED Medications  Medications - No data to display    Diagnostic Studies  Results Reviewed       Procedure Component Value Units Date/Time    HS Troponin 0hr (reflex protocol) [[id number]]  (Normal) Collected: 06/07/24 1220    Lab Status: Final result Specimen: Blood from Arm, Left Updated: 06/07/24 1254     hs TnI 0hr 4 ng/L     Basic metabolic panel [[id number]] Collected: 06/07/24 1220    Lab Status: Final result Specimen: Blood from Arm, Left Updated: 06/07/24 1252     Sodium 137 mmol/L      Potassium 4.0 mmol/L      Chloride 105 mmol/L      CO2 26 mmol/L      ANION GAP 6 mmol/L      BUN 16 mg/dL      Creatinine 0.69 mg/dL      Glucose 88 mg/dL      Calcium 8.8 mg/dL      eGFR 89 ml/min/1.73sq m     Narrative:      National Kidney Disease Foundation guidelines for Chronic Kidney Disease (CKD):     Stage 1 with normal or high GFR (GFR > 90 mL/min/1.73 square meters)    Stage 2 Mild CKD (GFR = 60-89 mL/min/1.73 square meters)    Stage 3A Moderate CKD (GFR = 45-59 mL/min/1.73 square  meters)    Stage 3B Moderate CKD (GFR = 30-44 mL/min/1.73 square meters)    Stage 4 Severe CKD (GFR = 15-29 mL/min/1.73 square meters)    Stage 5 End Stage CKD (GFR <15 mL/min/1.73 square meters)  Note: GFR calculation is accurate only with a steady state creatinine                   XR chest 1 view portable   ED Interpretation by Gumaro Germain DO (06/07 1256)   No infiltrates.  No cardiomegaly.                 Procedures  ECG 12 Lead Documentation Only    Date/Time: 6/7/2024 12:02 PM    Performed by: Gumaro Germain DO  Authorized by: Gumaro Germain DO    ECG reviewed by me, the ED Provider: yes    Patient location:  ED  Previous ECG:     Previous ECG:  Unavailable    Comparison to cardiac monitor: Yes    Comments:      Sinus bradycardia at a rate of 58 bpm.  Left axis deviation.  Poor R wave progression.  Left anterior fascicular block.  No ST-T wave abnormalities.  Similar to previous from 10/1/2007           ED Course  ED Course as of 06/07/24 1508   Fri Jun 07, 2024   1308 Patient is asymptomatic.  Discussed labs and EKG.  Patient is comfortable with discharge and will follow-up as outpatient                                             Medical Decision Making  Patient presents with intermittent lightheadedness after significant activity and exertion over the past couple of days.  EKG does not reveal any evidence of acute ischemia, significant bradycardia, advanced AV blocks, WPW, prolonged QTC, Brugada syndrome, hypertrophic cardiomyopathy, arrhythmogenic right ventricular dysplasia.  BMP is within normal limits and troponin is not indicative of acute ischemia.  Patient has been asymptomatic since arrival in the emergency department.  He is appropriate for discharge and outpatient follow-up.  Continue p.o. hydration and follow-up with PCP.  He agrees to return to ED sooner if he develops recurrent symptoms, chest pain, shortness of breath, other concerns.    Portions of the above record have been  "created with voice recognition software.  Occasional wrong word or \"sound alike\" substitutions may have occurred due to the inherent limitations of voice recognition software.  Read the chart carefully and recognize, using context, where substitutions may have occurred.      Problems Addressed:  Lightheadedness:     Details: No indication of cardiac abnormality.  Patient has been exerting himself recently and may have been mildly volume depleted.  Lightheadedness seemed positional in nature.  However, he has been asymptomatic in the emergency department and ED workup is unremarkable.    Amount and/or Complexity of Data Reviewed  External Data Reviewed: notes.  Labs: ordered.  Radiology: ordered and independent interpretation performed.  ECG/medicine tests: ordered and independent interpretation performed.    Risk  OTC drugs.             Disposition  Final diagnoses:   Lightheadedness     Time reflects when diagnosis was documented in both MDM as applicable and the Disposition within this note       Time User Action Codes Description Comment    6/7/2024  1:10 PM Gumaro Germain Add [R42] Lightheadedness           ED Disposition       ED Disposition   Discharge    Condition   Stable    Date/Time   Fri Jun 7, 2024  1:10 PM    Comment   Gerry Taylor discharge to home/self care.                   Follow-up Information       Follow up With Specialties Details Why Contact Info    Jose Juan Walker MD Internal Medicine Schedule an appointment as soon as possible for a visit  Return to ED sooner if symptoms worsen or persist. [address] [place] [address]  [phone]              Discharge Medication List as of 6/7/2024  1:10 PM        CONTINUE these medications which have NOT CHANGED    Details   lisinopril (ZESTRIL) 2.5 mg tablet Take 1 tablet (2.5 mg total) by mouth daily, Starting Thu 3/21/2024, Normal      acetaminophen (TYLENOL) 650 mg CR tablet Take 1,300 mg by mouth in the morning, " Historical Med      aspirin 81 mg chewable tablet Chew 81 mg daily, Historical Med      Calcium Citrate-Vitamin D 315-250 MG-UNIT TABS Take by mouth, Historical Med      Green Tea, Joy sinensis, (GREEN TEA EXTRACT PO) Take by mouth 1 dropper daily, Historical Med      Lutein 10 MG TABS Take 10 mg by mouth daily , Historical Med      !! Misc Natural Products (CHOLESTEROL SUPPORT PO) Take by mouth Cholestacare 800 mg  1 tab every other day, Historical Med      !! Misc Natural Products (LEG VEIN & CIRCULATION PO) Take by mouth Circulation Vein Support 2 tabs daily, Historical Med      !! Misc Natural Products (PROSTATE SUPPORT PO) Take 1 tablet by mouth in the morning, Historical Med      Multiple Vitamin (MULTIVITAMIN) tablet Take 1 tablet by mouth daily, Historical Med      Multiple Vitamins-Minerals (EYE SUPPORT PO) Take by mouth Ultimate Eye Support 12 mg Lutein, 6 mg Zerxanthin, 4 mg  Astaxathin  2 tabs daily, Historical Med      NON FORMULARY GOLO Nutrition Supplement 1 capsule daily, Historical Med      Omega-3 Fatty Acids (OMEGA-3 FISH OIL) 1200 MG CAPS Take 2 capsules by mouth in the morning, Historical Med      TURMERIC PO Take 1 tablet by mouth daily , Historical Med       !! - Potential duplicate medications found. Please discuss with provider.          No discharge procedures on file.    PDMP Review       None            ED Provider  Electronically Signed by             Gumaro Germain DO  06/07/24 5871

## 2024-07-24 ENCOUNTER — APPOINTMENT (OUTPATIENT)
Dept: LAB | Age: 84
End: 2024-07-24
Payer: MEDICARE

## 2024-07-24 DIAGNOSIS — E78.2 MIXED HYPERLIPIDEMIA: ICD-10-CM

## 2024-07-24 DIAGNOSIS — I10 ESSENTIAL HYPERTENSION: ICD-10-CM

## 2024-07-24 DIAGNOSIS — D69.6 THROMBOCYTOPENIA (HCC): ICD-10-CM

## 2024-07-24 LAB
ALBUMIN SERPL BCG-MCNC: 4 G/DL (ref 3.5–5)
ALP SERPL-CCNC: 39 U/L (ref 34–104)
ALT SERPL W P-5'-P-CCNC: 15 U/L (ref 7–52)
ANION GAP SERPL CALCULATED.3IONS-SCNC: 8 MMOL/L (ref 4–13)
AST SERPL W P-5'-P-CCNC: 17 U/L (ref 13–39)
BASOPHILS # BLD AUTO: 0.06 THOUSANDS/ÂΜL (ref 0–0.1)
BASOPHILS NFR BLD AUTO: 1 % (ref 0–1)
BILIRUB SERPL-MCNC: 1.01 MG/DL (ref 0.2–1)
BUN SERPL-MCNC: 14 MG/DL (ref 5–25)
CALCIUM SERPL-MCNC: 8.9 MG/DL (ref 8.4–10.2)
CHLORIDE SERPL-SCNC: 106 MMOL/L (ref 96–108)
CHOLEST SERPL-MCNC: 210 MG/DL
CO2 SERPL-SCNC: 28 MMOL/L (ref 21–32)
CREAT SERPL-MCNC: 0.83 MG/DL (ref 0.6–1.3)
EOSINOPHIL # BLD AUTO: 0.16 THOUSAND/ÂΜL (ref 0–0.61)
EOSINOPHIL NFR BLD AUTO: 3 % (ref 0–6)
ERYTHROCYTE [DISTWIDTH] IN BLOOD BY AUTOMATED COUNT: 12.3 % (ref 11.6–15.1)
GFR SERPL CREATININE-BSD FRML MDRD: 82 ML/MIN/1.73SQ M
GLUCOSE P FAST SERPL-MCNC: 90 MG/DL (ref 65–99)
HCT VFR BLD AUTO: 45.4 % (ref 36.5–49.3)
HDLC SERPL-MCNC: 62 MG/DL
HGB BLD-MCNC: 15.3 G/DL (ref 12–17)
IMM GRANULOCYTES # BLD AUTO: 0.02 THOUSAND/UL (ref 0–0.2)
IMM GRANULOCYTES NFR BLD AUTO: 0 % (ref 0–2)
LDLC SERPL CALC-MCNC: 128 MG/DL (ref 0–100)
LYMPHOCYTES # BLD AUTO: 1.73 THOUSANDS/ÂΜL (ref 0.6–4.47)
LYMPHOCYTES NFR BLD AUTO: 31 % (ref 14–44)
MCH RBC QN AUTO: 34.8 PG (ref 26.8–34.3)
MCHC RBC AUTO-ENTMCNC: 33.7 G/DL (ref 31.4–37.4)
MCV RBC AUTO: 103 FL (ref 82–98)
MONOCYTES # BLD AUTO: 0.62 THOUSAND/ÂΜL (ref 0.17–1.22)
MONOCYTES NFR BLD AUTO: 11 % (ref 4–12)
NEUTROPHILS # BLD AUTO: 2.94 THOUSANDS/ÂΜL (ref 1.85–7.62)
NEUTS SEG NFR BLD AUTO: 54 % (ref 43–75)
NONHDLC SERPL-MCNC: 148 MG/DL
NRBC BLD AUTO-RTO: 0 /100 WBCS
PLATELET # BLD AUTO: 127 THOUSANDS/UL (ref 149–390)
PMV BLD AUTO: 12.4 FL (ref 8.9–12.7)
POTASSIUM SERPL-SCNC: 4.5 MMOL/L (ref 3.5–5.3)
PROT SERPL-MCNC: 6.5 G/DL (ref 6.4–8.4)
RBC # BLD AUTO: 4.4 MILLION/UL (ref 3.88–5.62)
SODIUM SERPL-SCNC: 142 MMOL/L (ref 135–147)
TRIGL SERPL-MCNC: 100 MG/DL
WBC # BLD AUTO: 5.53 THOUSAND/UL (ref 4.31–10.16)

## 2024-07-24 PROCEDURE — 80053 COMPREHEN METABOLIC PANEL: CPT

## 2024-07-24 PROCEDURE — 85025 COMPLETE CBC W/AUTO DIFF WBC: CPT

## 2024-07-24 PROCEDURE — 36415 COLL VENOUS BLD VENIPUNCTURE: CPT

## 2024-07-24 PROCEDURE — 80061 LIPID PANEL: CPT

## 2024-07-25 ENCOUNTER — RA CDI HCC (OUTPATIENT)
Dept: OTHER | Facility: HOSPITAL | Age: 84
End: 2024-07-25

## 2024-08-01 ENCOUNTER — OFFICE VISIT (OUTPATIENT)
Age: 84
End: 2024-08-01
Payer: MEDICARE

## 2024-08-01 VITALS
HEIGHT: 73 IN | BODY MASS INDEX: 29.66 KG/M2 | SYSTOLIC BLOOD PRESSURE: 150 MMHG | DIASTOLIC BLOOD PRESSURE: 82 MMHG | WEIGHT: 223.8 LBS | OXYGEN SATURATION: 99 % | HEART RATE: 69 BPM | TEMPERATURE: 98.2 F

## 2024-08-01 DIAGNOSIS — E78.2 MIXED HYPERLIPIDEMIA: ICD-10-CM

## 2024-08-01 DIAGNOSIS — D69.6 THROMBOCYTOPENIA (HCC): ICD-10-CM

## 2024-08-01 DIAGNOSIS — Z00.00 MEDICARE ANNUAL WELLNESS VISIT, SUBSEQUENT: Primary | ICD-10-CM

## 2024-08-01 DIAGNOSIS — I10 ESSENTIAL HYPERTENSION: ICD-10-CM

## 2024-08-01 PROCEDURE — G0439 PPPS, SUBSEQ VISIT: HCPCS | Performed by: INTERNAL MEDICINE

## 2024-08-01 PROCEDURE — 99213 OFFICE O/P EST LOW 20 MIN: CPT | Performed by: INTERNAL MEDICINE

## 2024-08-01 RX ORDER — LISINOPRIL 5 MG/1
5 TABLET ORAL DAILY
Qty: 100 TABLET | Refills: 2 | Status: SHIPPED | OUTPATIENT
Start: 2024-08-01

## 2024-08-01 NOTE — PROGRESS NOTES
Ambulatory Visit  Name: Gerry Taylor      : 1940      MRN: [id number]  Encounter Provider: Jose Juan Walker MD  Encounter Date: 2024   Encounter department: [facility] [place]    Assessment & Plan   1. Medicare annual wellness visit, subsequent  Assessment & Plan:  Patient is up-to-date with age-appropriate screenings and immunizations.  Recommended that the patient receive Prevnar 20 next year  2. Thrombocytopenia (HCC)  Assessment & Plan:  This has been chronic.  No bleeding tendencies  Orders:  -     CBC and differential; Future; Expected date: 2025  -     Comprehensive metabolic panel; Future; Expected date: 2025  -     Lipid panel; Future; Expected date: 2025  3. Essential hypertension  Assessment & Plan:  Systolic blood pressure is mildly elevated.  Will increase lisinopril to 5 mg daily  Orders:  -     lisinopril (ZESTRIL) 5 mg tablet; Take 1 tablet (5 mg total) by mouth daily  -     CBC and differential; Future; Expected date: 2025  -     Comprehensive metabolic panel; Future; Expected date: 2025  -     Lipid panel; Future; Expected date: 2025  4. Mixed hyperlipidemia  Assessment & Plan:  Continues on omega-3 fatty acids.  Patient does not require the use of statins at this time  Orders:  -     CBC and differential; Future; Expected date: 2025  -     Comprehensive metabolic panel; Future; Expected date: 2025  -     Lipid panel; Future; Expected date: 2025       Preventive health issues were discussed with patient, and age appropriate screening tests were ordered as noted in patient's After Visit Summary. Personalized health advice and appropriate referrals for health education or preventive services given if needed, as noted in patient's After Visit Summary.    History of Present Illness     Presents to the office for Medicare wellness visit along with a follow-up visit.  He is doing quite well he has no  major complaints.  Together with his wife they have sold their house and moved into an apartment and now plan to do a bit of traveling in the assisted years.  He is feeling well with no complaints labs are reviewed.  Minimal elevation of cholesterol not requiring statin therapy.  Serum chemistries are within normal limits as is his CBC other than a chronically low platelet count and mild macrocytosis.  Patient does not have any issues regarding bleeding or bruising       Patient Care Team:  Jose Juan Walker MD as PCP - General (Internal Medicine)    Review of Systems   Constitutional: Negative.  Negative for activity change, appetite change, chills, diaphoresis, fatigue, fever and unexpected weight change.   HENT: Negative.     Eyes: Negative.    Respiratory: Negative.     Cardiovascular: Negative.    Gastrointestinal: Negative.    Endocrine: Negative.    Genitourinary: Negative.    Musculoskeletal: Negative.    Skin: Negative.    Neurological: Negative.    Hematological: Negative.    Psychiatric/Behavioral:  The patient is not nervous/anxious.      Medical History Reviewed by provider this encounter:       Annual Wellness Visit Questionnaire   Gerry is here for his Subsequent Wellness visit.     Health Risk Assessment:   Patient rates overall health as good. Patient feels that their physical health rating is same. Patient is satisfied with their life. Eyesight was rated as slightly worse. Hearing was rated as same. Patient feels that their emotional and mental health rating is same. Patients states they are sometimes angry. Patient states they are sometimes unusually tired/fatigued. Pain experienced in the last 7 days has been none. Patient states that he has experienced no weight loss or gain in last 6 months.     Depression Screening:   PHQ-2 Score: 0      Fall Risk Screening:   In the past year, patient has experienced: no history of falling in past year      Home Safety:  Patient does not have trouble  with stairs inside or outside of their home. Patient has working smoke alarms and has working carbon monoxide detector. Home safety hazards include: none.     Nutrition:   Current diet is Regular and Limited junk food.     Medications:   Patient is currently taking over-the-counter supplements. OTC medications include: see medication list. Patient is able to manage medications.     Activities of Daily Living (ADLs)/Instrumental Activities of Daily Living (IADLs):   Walk and transfer into and out of bed and chair?: Yes  Dress and groom yourself?: Yes    Bathe or shower yourself?: Yes    Feed yourself? Yes  Do your laundry/housekeeping?: Yes  Manage your money, pay your bills and track your expenses?: Yes  Make your own meals?: Yes    Do your own shopping?: Yes    Previous Hospitalizations:   Any hospitalizations or ED visits within the last 12 months?: Yes    How many hospitalizations have you had in the last year?: 1-2    Advance Care Planning:   Living will: Yes    Advanced directive: Yes    Advanced directive counseling given: Yes    Five wishes given: No    Patient declined ACP directive: No    End of Life Decisions reviewed with patient: Yes    Provider agrees with end of life decisions: Yes      Cognitive Screening:   Provider or family/friend/caregiver concerned regarding cognition?: No    PREVENTIVE SCREENINGS      Cardiovascular Screening:    General: Screening Not Indicated, History Lipid Disorder and Screening Current      Diabetes Screening:     General: Screening Current      Colorectal Cancer Screening:     General: Risks and Benefits Discussed    Due for: Colonoscopy - Low Risk      Prostate Cancer Screening:    General: Screening Not Indicated and Risks and Benefits Discussed      Osteoporosis Screening:    General: Screening Not Indicated      Abdominal Aortic Aneurysm (AAA) Screening:        General: Screening Not Indicated      Lung Cancer Screening:     General: Screening Not Indicated       "Hepatitis C Screening:    General: Screening Not Indicated    Screening, Brief Intervention, and Referral to Treatment (SBIRT)    Screening  Typical number of drinks in a day: 0  Typical number of drinks in a week: 3  Interpretation: Low risk drinking behavior.    Social Determinants of Health     Financial Resource Strain: Low Risk  (4/6/2023)    Overall Financial Resource Strain (CARDIA)     Difficulty of Paying Living Expenses: Not hard at all   Food Insecurity: No Food Insecurity (8/1/2024)    Hunger Vital Sign     Worried About Running Out of Food in the Last Year: Never true     Ran Out of Food in the Last Year: Never true   Transportation Needs: No Transportation Needs (8/1/2024)    PRAPARE - Transportation     Lack of Transportation (Medical): No     Lack of Transportation (Non-Medical): No   Housing Stability: Low Risk  (8/1/2024)    Housing Stability Vital Sign     Unable to Pay for Housing in the Last Year: No     Number of Times Moved in the Last Year: 1     Homeless in the Last Year: No   Utilities: Not At Risk (8/1/2024)    [facility] Utilities     Threatened with loss of utilities: No     No results found.    Objective     /82 (BP Location: Left arm, Patient Position: Sitting, Cuff Size: Standard)   Pulse 69   Temp 98.2 °F (36.8 °C) (Temporal)   Ht 6' 1.23\" (1.86 m)   Wt 102 kg (223 lb 12.8 oz)   SpO2 99%   BMI 29.34 kg/m²     Physical Exam  Vitals reviewed.   Constitutional:       Appearance: He is well-developed and normal weight.   HENT:      Head: Normocephalic and atraumatic.      Right Ear: External ear normal.      Left Ear: External ear normal.      Nose: Nose normal.      Mouth/Throat:      Mouth: Mucous membranes are moist.   Eyes:      General: No scleral icterus.     Conjunctiva/sclera: Conjunctivae normal.      Pupils: Pupils are equal, round, and reactive to light.   Neck:      Vascular: No carotid bruit.   Cardiovascular:      Rate and Rhythm: Normal rate and regular rhythm.      " Heart sounds: Normal heart sounds. No murmur heard.  Pulmonary:      Effort: Pulmonary effort is normal. No respiratory distress.      Breath sounds: Normal breath sounds.   Abdominal:      General: Abdomen is flat. Bowel sounds are normal. There is no distension.      Tenderness: There is no abdominal tenderness.   Musculoskeletal:         General: No swelling, tenderness or deformity. Normal range of motion.      Cervical back: Normal range of motion and neck supple.      Right lower leg: No edema.      Left lower leg: No edema.   Skin:     General: Skin is warm and dry.      Coloration: Skin is not jaundiced.      Findings: No bruising, erythema or rash.   Neurological:      General: No focal deficit present.      Mental Status: He is alert and oriented to person, place, and time. Mental status is at baseline.   Psychiatric:         Mood and Affect: Mood normal.         Behavior: Behavior normal.         Thought Content: Thought content normal.         Judgment: Judgment normal.

## 2024-08-01 NOTE — PATIENT INSTRUCTIONS
Medicare Preventive Visit Patient Instructions  Thank you for completing your Welcome to Medicare Visit or Medicare Annual Wellness Visit today. Your next wellness visit will be due in one year (8/2/2025).  The screening/preventive services that you may require over the next 5-10 years are detailed below. Some tests may not apply to you based off risk factors and/or age. Screening tests ordered at today's visit but not completed yet may show as past due. Also, please note that scanned in results may not display below.  Preventive Screenings:  Service Recommendations Previous Testing/Comments   Colorectal Cancer Screening  Colonoscopy    Fecal Occult Blood Test (FOBT)/Fecal Immunochemical Test (FIT)  Fecal DNA/Cologuard Test  Flexible Sigmoidoscopy Age: 45-75 years old   Colonoscopy: every 10 years (May be performed more frequently if at higher risk)  OR  FOBT/FIT: every 1 year  OR  Cologuard: every 3 years  OR  Sigmoidoscopy: every 5 years  Screening may be recommended earlier than age 45 if at higher risk for colorectal cancer. Also, an individualized decision between you and your healthcare provider will decide whether screening between the ages of 76-85 would be appropriate. Colonoscopy: 08/01/2018  FOBT/FIT: Not on file  Cologuard: Not on file  Sigmoidoscopy: Not on file          Prostate Cancer Screening Individualized decision between patient and health care provider in men between ages of 55-69   Medicare will cover every 12 months beginning on the day after your 50th birthday PSA: 0.8 ng/mL     Screening Not Indicated     Hepatitis C Screening Once for adults born between 1945 and 1965  More frequently in patients at high risk for Hepatitis C Hep C Antibody: Not on file        Diabetes Screening 1-2 times per year if you're at risk for diabetes or have pre-diabetes Fasting glucose: 90 mg/dL (7/24/2024)  A1C: 5.1 % (2/22/2021)  Screening Current   Cholesterol Screening Once every 5 years if you don't have a  lipid disorder. May order more often based on risk factors. Lipid panel: 07/24/2024  Screening Not Indicated  History Lipid Disorder      Other Preventive Screenings Covered by Medicare:  Abdominal Aortic Aneurysm (AAA) Screening: covered once if your at risk. You're considered to be at risk if you have a family history of AAA or a male between the age of 65-75 who smoking at least 100 cigarettes in your lifetime.  Lung Cancer Screening: covers low dose CT scan once per year if you meet all of the following conditions: (1) Age 55-77; (2) No signs or symptoms of lung cancer; (3) Current smoker or have quit smoking within the last 15 years; (4) You have a tobacco smoking history of at least 20 pack years (packs per day x number of years you smoked); (5) You get a written order from a healthcare provider.  Glaucoma Screening: covered annually if you're considered high risk: (1) You have diabetes OR (2) Family history of glaucoma OR (3)  aged 50 and older OR (4)  American aged 65 and older  Osteoporosis Screening: covered every 2 years if you meet one of the following conditions: (1) Have a vertebral abnormality; (2) On glucocorticoid therapy for more than 3 months; (3) Have primary hyperparathyroidism; (4) On osteoporosis medications and need to assess response to drug therapy.  HIV Screening: covered annually if you're between the age of 15-65. Also covered annually if you are younger than 15 and older than 65 with risk factors for HIV infection. For pregnant patients, it is covered up to 3 times per pregnancy.    Immunizations:  Immunization Recommendations   Influenza Vaccine Annual influenza vaccination during flu season is recommended for all persons aged >= 6 months who do not have contraindications   Pneumococcal Vaccine   * Pneumococcal conjugate vaccine = PCV13 (Prevnar 13), PCV15 (Vaxneuvance), PCV20 (Prevnar 20)  * Pneumococcal polysaccharide vaccine = PPSV23 (Pneumovax) Adults 19-64  yo with certain risk factors or if 65+ yo  If never received any pneumonia vaccine: recommend Prevnar 20 (PCV20)  Give PCV20 if previously received 1 dose of PCV13 or PPSV23   Hepatitis B Vaccine 3 dose series if at intermediate or high risk (ex: diabetes, end stage renal disease, liver disease)   Respiratory syncytial virus (RSV) Vaccine - COVERED BY MEDICARE PART D  * RSVPreF3 (Arexvy) CDC recommends that adults 60 years of age and older may receive a single dose of RSV vaccine using shared clinical decision-making (SCDM)   Tetanus (Td) Vaccine - COST NOT COVERED BY MEDICARE PART B Following completion of primary series, a booster dose should be given every 10 years to maintain immunity against tetanus. Td may also be given as tetanus wound prophylaxis.   Tdap Vaccine - COST NOT COVERED BY MEDICARE PART B Recommended at least once for all adults. For pregnant patients, recommended with each pregnancy.   Shingles Vaccine (Shingrix) - COST NOT COVERED BY MEDICARE PART B  2 shot series recommended in those 19 years and older who have or will have weakened immune systems or those 50 years and older     Health Maintenance Due:  There are no preventive care reminders to display for this patient.  Immunizations Due:      Topic Date Due   • COVID-19 Vaccine (5 - 2023-24 season) 09/01/2023   • Influenza Vaccine (1) 09/01/2024     Advance Directives   What are advance directives?  Advance directives are legal documents that state your wishes and plans for medical care. These plans are made ahead of time in case you lose your ability to make decisions for yourself. Advance directives can apply to any medical decision, such as the treatments you want, and if you want to donate organs.   What are the types of advance directives?  There are many types of advance directives, and each state has rules about how to use them. You may choose a combination of any of the following:  Living will:  This is a written record of the  treatment you want. You can also choose which treatments you do not want, which to limit, and which to stop at a certain time. This includes surgery, medicine, IV fluid, and tube feedings.   Durable power of  for healthcare (DPAHC):  This is a written record that states who you want to make healthcare choices for you when you are unable to make them for yourself. This person, called a proxy, is usually a family member or a friend. You may choose more than 1 proxy.  Do not resuscitate (DNR) order:  A DNR order is used in case your heart stops beating or you stop breathing. It is a request not to have certain forms of treatment, such as CPR. A DNR order may be included in other types of advance directives.  Medical directive:  This covers the care that you want if you are in a coma, near death, or unable to make decisions for yourself. You can list the treatments you want for each condition. Treatment may include pain medicine, surgery, blood transfusions, dialysis, IV or tube feedings, and a ventilator (breathing machine).  Values history:  This document has questions about your views, beliefs, and how you feel and think about life. This information can help others choose the care that you would choose.  Why are advance directives important?  An advance directive helps you control your care. Although spoken wishes may be used, it is better to have your wishes written down. Spoken wishes can be misunderstood, or not followed. Treatments may be given even if you do not want them. An advance directive may make it easier for your family to make difficult choices about your care.   Weight Management   Why it is important to manage your weight:  Being overweight increases your risk of health conditions such as heart disease, high blood pressure, type 2 diabetes, and certain types of cancer. It can also increase your risk for osteoarthritis, sleep apnea, and other respiratory problems. Aim for a slow, steady weight  loss. Even a small amount of weight loss can lower your risk of health problems.  How to lose weight safely:  A safe and healthy way to lose weight is to eat fewer calories and get regular exercise. You can lose up about 1 pound a week by decreasing the number of calories you eat by 500 calories each day.   Healthy meal plan for weight management:  A healthy meal plan includes a variety of foods, contains fewer calories, and helps you stay healthy. A healthy meal plan includes the following:  Eat whole-grain foods more often.  A healthy meal plan should contain fiber. Fiber is the part of grains, fruits, and vegetables that is not broken down by your body. Whole-grain foods are healthy and provide extra fiber in your diet. Some examples of whole-grain foods are whole-wheat breads and pastas, oatmeal, brown rice, and bulgur.  Eat a variety of vegetables every day.  Include dark, leafy greens such as spinach, kale, margo greens, and mustard greens. Eat yellow and orange vegetables such as carrots, sweet potatoes, and winter squash.   Eat a variety of fruits every day.  Choose fresh or canned fruit (canned in its own juice or light syrup) instead of juice. Fruit juice has very little or no fiber.  Eat low-fat dairy foods.  Drink fat-free (skim) milk or 1% milk. Eat fat-free yogurt and low-fat cottage cheese. Try low-fat cheeses such as mozzarella and other reduced-fat cheeses.  Choose meat and other protein foods that are low in fat.  Choose beans or other legumes such as split peas or lentils. Choose fish, skinless poultry (chicken or turkey), or lean cuts of red meat (beef or pork). Before you cook meat or poultry, cut off any visible fat.   Use less fat and oil.  Try baking foods instead of frying them. Add less fat, such as margarine, sour cream, regular salad dressing and mayonnaise to foods. Eat fewer high-fat foods. Some examples of high-fat foods include french fries, doughnuts, ice cream, and cakes.  Eat  fewer sweets.  Limit foods and drinks that are high in sugar. This includes candy, cookies, regular soda, and sweetened drinks.  Exercise:  Exercise at least 30 minutes per day on most days of the week. Some examples of exercise include walking, biking, dancing, and swimming. You can also fit in more physical activity by taking the stairs instead of the elevator or parking farther away from stores. Ask your healthcare provider about the best exercise plan for you.      © Copyright [employer] 2018 Information is for End User's use only and may not be sold, redistributed or otherwise used for commercial purposes. All illustrations and images included in CareNotes® are the copyrighted property of A.D.A.M., Inc. or [employer]

## 2024-08-01 NOTE — ASSESSMENT & PLAN NOTE
Patient is up-to-date with age-appropriate screenings and immunizations.  Recommended that the patient receive Prevnar 20 next year

## 2024-08-31 PROBLEM — Z00.00 MEDICARE ANNUAL WELLNESS VISIT, SUBSEQUENT: Status: RESOLVED | Noted: 2022-03-22 | Resolved: 2024-08-31

## 2024-09-23 ENCOUNTER — OFFICE VISIT (OUTPATIENT)
Age: 84
End: 2024-09-23
Payer: MEDICARE

## 2024-09-23 VITALS
HEART RATE: 78 BPM | DIASTOLIC BLOOD PRESSURE: 70 MMHG | SYSTOLIC BLOOD PRESSURE: 124 MMHG | WEIGHT: 221 LBS | HEIGHT: 73 IN | BODY MASS INDEX: 29.29 KG/M2 | TEMPERATURE: 98.3 F | OXYGEN SATURATION: 98 %

## 2024-09-23 DIAGNOSIS — J20.8 ACUTE BRONCHITIS DUE TO COVID-19 VIRUS: ICD-10-CM

## 2024-09-23 DIAGNOSIS — R05.9 COUGH, UNSPECIFIED TYPE: Primary | ICD-10-CM

## 2024-09-23 DIAGNOSIS — U07.1 ACUTE BRONCHITIS DUE TO COVID-19 VIRUS: ICD-10-CM

## 2024-09-23 LAB
SARS-COV-2 AG UPPER RESP QL IA: POSITIVE
VALID CONTROL: ABNORMAL

## 2024-09-23 PROCEDURE — G2211 COMPLEX E/M VISIT ADD ON: HCPCS | Performed by: INTERNAL MEDICINE

## 2024-09-23 PROCEDURE — 99213 OFFICE O/P EST LOW 20 MIN: CPT | Performed by: INTERNAL MEDICINE

## 2024-09-23 PROCEDURE — 87811 SARS-COV-2 COVID19 W/OPTIC: CPT | Performed by: INTERNAL MEDICINE

## 2024-09-23 NOTE — ASSESSMENT & PLAN NOTE
Symptomatic treatment with OTC decongestants, cough suppressants and mucolytic's.  Patient should contact the office should his condition change or should he begin to experience high fever, any shortness of breath, worsening congestion.

## 2024-09-23 NOTE — PROGRESS NOTES
Ambulatory Visit  Name: Gerry Taylor      : 1940      MRN: [id number]  Encounter Provider: Jose Juan Walker MD  Encounter Date: 2024   Encounter department: [facility]    Assessment & Plan  Cough, unspecified type    Orders:    POCT Rapid Covid Ag    Acute bronchitis due to COVID-19 virus  Symptomatic treatment with OTC decongestants, cough suppressants and mucolytic's.  Patient should contact the office should his condition change or should he begin to experience high fever, any shortness of breath, worsening congestion.            History of Present Illness     Patient was vacationing with his wife on a tour of [place] for 12 days.  He began to have runny nose and cough along with some sinus congestion for the past 2 days.  He denies any fevers or chills.  He denies any excessive fatigue.  He has no shortness of breath.  Cough is productive of some yellow mucus.  His symptoms began on Saturday, 2 days ago.  His wife started having symptoms 5 days ago.  She tested for COVID and was negative.  She tested again in the office today was negative the patient was strongly positive for COVID.  Patient looks well, feels well other than a persistent cough          Review of Systems   Constitutional: Negative.  Negative for activity change, appetite change, chills, diaphoresis, fatigue, fever and unexpected weight change.   HENT:  Positive for congestion and rhinorrhea.    Eyes: Negative.    Respiratory:  Positive for cough. Negative for choking, shortness of breath, wheezing and stridor.    Cardiovascular: Negative.    Gastrointestinal: Negative.    Endocrine: Negative.    Genitourinary: Negative.    Musculoskeletal: Negative.    Skin: Negative.    Allergic/Immunologic: Negative.    Neurological: Negative.    Hematological: Negative.    Psychiatric/Behavioral: Negative.             Objective     /70 (BP Location: Left arm, Patient Position: Sitting, Cuff Size:  "Adult)   Pulse 78   Temp 98.3 °F (36.8 °C) (Temporal)   Ht 6' 1\" (1.854 m)   Wt 100 kg (221 lb)   SpO2 98% Comment: ra  BMI 29.16 kg/m²     Physical Exam  Vitals reviewed.   Constitutional:       General: He is not in acute distress.     Appearance: Normal appearance. He is not ill-appearing, toxic-appearing or diaphoretic.   HENT:      Head: Normocephalic and atraumatic.      Right Ear: External ear normal.      Left Ear: External ear normal.      Nose: Nose normal.      Mouth/Throat:      Mouth: Mucous membranes are moist.      Pharynx: Oropharynx is clear.   Eyes:      General: No scleral icterus.     Conjunctiva/sclera: Conjunctivae normal.      Pupils: Pupils are equal, round, and reactive to light.   Cardiovascular:      Rate and Rhythm: Normal rate and regular rhythm.      Heart sounds: Normal heart sounds. No murmur heard.  Pulmonary:      Effort: Pulmonary effort is normal. No respiratory distress.      Breath sounds: Normal breath sounds.   Abdominal:      General: Abdomen is flat. There is no distension.   Musculoskeletal:      Cervical back: Neck supple.      Right lower leg: No edema.      Left lower leg: No edema.   Lymphadenopathy:      Cervical: No cervical adenopathy.   Skin:     General: Skin is warm.      Coloration: Skin is not jaundiced.      Findings: No bruising, erythema or rash.   Neurological:      General: No focal deficit present.      Mental Status: He is alert and oriented to person, place, and time. Mental status is at baseline.   Psychiatric:         Mood and Affect: Mood normal.         Behavior: Behavior normal.         "

## 2024-10-23 PROBLEM — U07.1 ACUTE BRONCHITIS DUE TO COVID-19 VIRUS: Status: RESOLVED | Noted: 2024-09-23 | Resolved: 2024-10-23

## 2024-10-23 PROBLEM — J20.8 ACUTE BRONCHITIS DUE TO COVID-19 VIRUS: Status: RESOLVED | Noted: 2024-09-23 | Resolved: 2024-10-23

## 2025-01-10 ENCOUNTER — OFFICE VISIT (OUTPATIENT)
Age: 85
End: 2025-01-10
Payer: MEDICARE

## 2025-01-10 VITALS
BODY MASS INDEX: 29.95 KG/M2 | WEIGHT: 226 LBS | SYSTOLIC BLOOD PRESSURE: 130 MMHG | DIASTOLIC BLOOD PRESSURE: 86 MMHG | TEMPERATURE: 98 F | OXYGEN SATURATION: 98 % | HEART RATE: 79 BPM | HEIGHT: 73 IN

## 2025-01-10 DIAGNOSIS — I10 ESSENTIAL HYPERTENSION: ICD-10-CM

## 2025-01-10 DIAGNOSIS — J02.9 SORE THROAT: Primary | ICD-10-CM

## 2025-01-10 LAB
S PYO AG THROAT QL: NEGATIVE
SARS-COV-2 AG UPPER RESP QL IA: NEGATIVE
VALID CONTROL: NORMAL

## 2025-01-10 PROCEDURE — 87070 CULTURE OTHR SPECIMN AEROBIC: CPT

## 2025-01-10 PROCEDURE — 87811 SARS-COV-2 COVID19 W/OPTIC: CPT

## 2025-01-10 PROCEDURE — 87880 STREP A ASSAY W/OPTIC: CPT

## 2025-01-10 PROCEDURE — 99213 OFFICE O/P EST LOW 20 MIN: CPT

## 2025-01-10 NOTE — PROGRESS NOTES
Name: Gerry Taylor      : 1940      MRN: [id number]  Encounter Provider: Noris Cisneros PA-C  Encounter Date: 1/10/2025   Encounter department: [facility] [place]  :  Assessment & Plan  Sore throat  Sore throat x 2 days   symptoms likely viral in nature  POCT strep and COVID-negative.  Will send for throat culture  Advised Flonase twice daily, second-generation antihistamine such as Zyrtec daily  Advised increased rest, fluid intake  Discussed symptomatic care including salt water gargles for sore throat, steam showers for congestion, warm liquids, local honey  Patient can take Tylenol/ibuprofen for fevers and body aches  Discussed red flag symptoms including going to the ER with chest pain or shortness of breath  Discussed course of illness could be 1 to 2 weeks.   Return to the office for reevaluation if symptoms do not improve in 1-2 weeks     Orders:    Poct Covid 19 Rapid Antigen Test    POCT rapid ANTIGEN strepA    Throat culture; Future    Essential hypertension  Fairly well controlled.  Continue lisinopril 5 mg daily                History of Present Illness     Patient is an 84-year-old male presenting to the office for concerns of sore throat x 2 days  He also endorses sneezing, denies other URI symptoms including fevers, congestion, postnasal drip, rhinorrhea, cough    Tx: Coricidin- some improvement      URI   This is a new problem. The current episode started in the past 7 days. The problem has been unchanged. There has been no fever. Associated symptoms include a sore throat. Pertinent negatives include no abdominal pain, chest pain, congestion, coughing, diarrhea, headaches, nausea, rhinorrhea, sinus pain, vomiting or wheezing. Treatments tried: Coricidin. The treatment provided mild relief.           Review of Systems   Constitutional:  Negative for chills, fatigue and fever.   HENT:  Positive for sore throat. Negative for congestion, postnasal drip,  "rhinorrhea, sinus pressure, sinus pain and trouble swallowing.    Eyes:  Negative for discharge and redness.   Respiratory:  Negative for cough, chest tightness, shortness of breath and wheezing.    Cardiovascular:  Negative for chest pain and palpitations.   Gastrointestinal:  Negative for abdominal pain, constipation, diarrhea, nausea and vomiting.   Musculoskeletal:  Negative for arthralgias and myalgias.   Neurological:  Negative for dizziness, light-headedness and headaches.       Objective   /86 (BP Location: Left arm, Patient Position: Sitting, Cuff Size: Standard)   Pulse 79   Temp 98 °F (36.7 °C) (Temporal)   Ht 6' 1\" (1.854 m)   Wt 103 kg (226 lb)   SpO2 98%   BMI 29.82 kg/m²      Physical Exam  Vitals and nursing note reviewed.   Constitutional:       General: He is not in acute distress.     Appearance: Normal appearance. He is not ill-appearing.   HENT:      Head: Normocephalic and atraumatic.      Right Ear: Tympanic membrane, ear canal and external ear normal.      Left Ear: Tympanic membrane, ear canal and external ear normal.      Nose: Nose normal.      Mouth/Throat:      Mouth: Mucous membranes are moist.      Pharynx: Oropharynx is clear. Uvula midline. Posterior oropharyngeal erythema present. No postnasal drip.      Tonsils: No tonsillar exudate.   Eyes:      General: No scleral icterus.     Conjunctiva/sclera: Conjunctivae normal.   Cardiovascular:      Rate and Rhythm: Normal rate and regular rhythm.      Heart sounds: Normal heart sounds. No murmur heard.     No friction rub. No gallop.   Pulmonary:      Effort: Pulmonary effort is normal. No respiratory distress.      Breath sounds: Normal breath sounds. No wheezing, rhonchi or rales.   Chest:      Chest wall: No tenderness.   Musculoskeletal:      Cervical back: No tenderness.      Right lower leg: No edema.      Left lower leg: No edema.   Lymphadenopathy:      Cervical: No cervical adenopathy.   Skin:     General: Skin is " warm and dry.      Coloration: Skin is not pale.      Findings: No erythema.   Neurological:      General: No focal deficit present.      Mental Status: He is alert and oriented to person, place, and time. Mental status is at baseline.   Psychiatric:         Mood and Affect: Mood normal.         Behavior: Behavior normal.           Disclaimer: This note was generated with voice recognition software.  Phonetic, grammatical, and spelling errors may be present as a result.  Please contact provider with any concerns or questions

## 2025-01-12 LAB — BACTERIA THROAT CULT: NORMAL

## 2025-01-13 ENCOUNTER — RESULTS FOLLOW-UP (OUTPATIENT)
Dept: INTERNAL MEDICINE CLINIC | Facility: OTHER | Age: 85
End: 2025-01-13

## 2025-01-17 ENCOUNTER — OFFICE VISIT (OUTPATIENT)
Age: 85
End: 2025-01-17
Payer: MEDICARE

## 2025-01-17 VITALS
OXYGEN SATURATION: 98 % | SYSTOLIC BLOOD PRESSURE: 114 MMHG | HEART RATE: 80 BPM | HEIGHT: 73 IN | TEMPERATURE: 97.6 F | BODY MASS INDEX: 30.06 KG/M2 | WEIGHT: 226.8 LBS | DIASTOLIC BLOOD PRESSURE: 66 MMHG

## 2025-01-17 DIAGNOSIS — D69.6 THROMBOCYTOPENIA (HCC): ICD-10-CM

## 2025-01-17 DIAGNOSIS — J06.9 ACUTE URI: Primary | ICD-10-CM

## 2025-01-17 PROCEDURE — G2211 COMPLEX E/M VISIT ADD ON: HCPCS

## 2025-01-17 PROCEDURE — 99213 OFFICE O/P EST LOW 20 MIN: CPT

## 2025-01-17 RX ORDER — BENZONATATE 100 MG/1
100 CAPSULE ORAL 3 TIMES DAILY PRN
Qty: 20 CAPSULE | Refills: 0 | Status: SHIPPED | OUTPATIENT
Start: 2025-01-17

## 2025-01-17 NOTE — PROGRESS NOTES
Name: Gerry Taylor      : 1940      MRN: [id number]  Encounter Provider: Noris Cisneros PA-C  Encounter Date: 2025   Encounter department: [facility]  :  Assessment & Plan  Acute URI  Symptoms persisting despite supportive care  Will treat with Augmentin twice daily x 7 days, Tessalon Perles as needed for cough  Advised Flonase twice daily, second-generation antihistamine such as Zyrtec daily  Advised increased rest, fluid intake  Discussed symptomatic care including salt water gargles for sore throat, steam showers for congestion, warm liquids   Discussed red flag symptoms including going to the ER with chest pain or shortness of breath  Discussed course of illness could be 1 to 2 weeks.   Return to the office for reevaluation if symptoms do not improve in 1-2 weeks     Orders:    amoxicillin-clavulanate (AUGMENTIN) 875-125 mg per tablet; Take 1 tablet by mouth every 12 (twelve) hours for 7 days    benzonatate (TESSALON PERLES) 100 mg capsule; Take 1 capsule (100 mg total) by mouth 3 (three) times a day as needed for cough    Thrombocytopenia (HCC)  Mild thrombocytopenia noted on last CBC, encouraged repeat CBC.  Order in chart              History of Present Illness     Patient is an 84-year-old male presenting to the office for concerns of continued URI symptoms  He was seen on 1/10/2025 for sore throat.  Denied all other URI symptoms at that time  POCT strep and COVID-negative, throat culture were negative  Supportive care was discussed at that time  Patient now endorsing productive cough with discolored mucus, congestion, rhinorrhea.  Denies shortness of breath or wheezing      Tx tried: Coricedin    URI   This is a new problem. The current episode started 1 to 4 weeks ago. The problem has been gradually worsening. There has been no fever. Associated symptoms include congestion, coughing and rhinorrhea. Pertinent negatives include no abdominal pain, chest  "pain, diarrhea, headaches, nausea, sore throat, vomiting or wheezing.           Review of Systems   Constitutional:  Negative for chills, fatigue and fever.   HENT:  Positive for congestion, postnasal drip and rhinorrhea. Negative for sinus pressure, sore throat and trouble swallowing. Drooling: productive.   Eyes:  Negative for discharge and redness.   Respiratory:  Positive for cough. Negative for chest tightness, shortness of breath and wheezing.    Cardiovascular:  Negative for chest pain and palpitations.   Gastrointestinal:  Negative for abdominal pain, diarrhea, nausea and vomiting.   Neurological:  Negative for dizziness, light-headedness and headaches.       Objective   /66 (BP Location: Left arm, Patient Position: Sitting, Cuff Size: Standard)   Pulse 80   Temp 97.6 °F (36.4 °C) (Temporal)   Ht 6' 1.31\" (1.862 m)   Wt 103 kg (226 lb 12.8 oz)   SpO2 98%   BMI 29.67 kg/m²      Physical Exam  Vitals and nursing note reviewed.   Constitutional:       General: He is not in acute distress.     Appearance: Normal appearance. He is not ill-appearing.   HENT:      Head: Normocephalic and atraumatic.      Right Ear: Tympanic membrane, ear canal and external ear normal.      Left Ear: Tympanic membrane, ear canal and external ear normal.      Nose: Nose normal.      Right Sinus: No maxillary sinus tenderness or frontal sinus tenderness.      Left Sinus: No maxillary sinus tenderness or frontal sinus tenderness.      Mouth/Throat:      Mouth: Mucous membranes are moist.      Pharynx: Oropharynx is clear. Posterior oropharyngeal erythema present. No oropharyngeal exudate.   Eyes:      General: No scleral icterus.        Right eye: No discharge.         Left eye: No discharge.      Conjunctiva/sclera: Conjunctivae normal.      Pupils: Pupils are equal, round, and reactive to light.   Cardiovascular:      Rate and Rhythm: Normal rate and regular rhythm.      Heart sounds: Normal heart sounds. No murmur " heard.     No friction rub. No gallop.   Pulmonary:      Effort: Pulmonary effort is normal. No respiratory distress.      Breath sounds: Normal breath sounds. No wheezing, rhonchi or rales.   Chest:      Chest wall: No tenderness.   Musculoskeletal:      Cervical back: No tenderness.      Right lower leg: No edema.      Left lower leg: No edema.   Lymphadenopathy:      Cervical: No cervical adenopathy.   Skin:     General: Skin is warm and dry.      Coloration: Skin is not pale.      Findings: No erythema.   Neurological:      General: No focal deficit present.      Mental Status: He is alert and oriented to person, place, and time. Mental status is at baseline.   Psychiatric:         Mood and Affect: Mood normal.         Behavior: Behavior normal.           Disclaimer: This note was generated with voice recognition software.  Phonetic, grammatical, and spelling errors may be present as a result.  Please contact provider with any concerns or questions

## 2025-01-27 ENCOUNTER — APPOINTMENT (OUTPATIENT)
Dept: LAB | Age: 85
End: 2025-01-27
Payer: MEDICARE

## 2025-01-27 ENCOUNTER — RA CDI HCC (OUTPATIENT)
Dept: OTHER | Facility: HOSPITAL | Age: 85
End: 2025-01-27

## 2025-01-27 DIAGNOSIS — E78.2 MIXED HYPERLIPIDEMIA: ICD-10-CM

## 2025-01-27 DIAGNOSIS — I10 ESSENTIAL HYPERTENSION: ICD-10-CM

## 2025-01-27 DIAGNOSIS — D69.6 THROMBOCYTOPENIA (HCC): ICD-10-CM

## 2025-01-27 LAB
ALBUMIN SERPL BCG-MCNC: 4.1 G/DL (ref 3.5–5)
ALP SERPL-CCNC: 43 U/L (ref 34–104)
ALT SERPL W P-5'-P-CCNC: 17 U/L (ref 7–52)
ANION GAP SERPL CALCULATED.3IONS-SCNC: 5 MMOL/L (ref 4–13)
AST SERPL W P-5'-P-CCNC: 19 U/L (ref 13–39)
BASOPHILS # BLD AUTO: 0.06 THOUSANDS/ΜL (ref 0–0.1)
BASOPHILS NFR BLD AUTO: 1 % (ref 0–1)
BILIRUB SERPL-MCNC: 0.65 MG/DL (ref 0.2–1)
BUN SERPL-MCNC: 20 MG/DL (ref 5–25)
CALCIUM SERPL-MCNC: 9 MG/DL (ref 8.4–10.2)
CHLORIDE SERPL-SCNC: 105 MMOL/L (ref 96–108)
CHOLEST SERPL-MCNC: 179 MG/DL (ref ?–200)
CO2 SERPL-SCNC: 29 MMOL/L (ref 21–32)
CREAT SERPL-MCNC: 0.66 MG/DL (ref 0.6–1.3)
EOSINOPHIL # BLD AUTO: 0.25 THOUSAND/ΜL (ref 0–0.61)
EOSINOPHIL NFR BLD AUTO: 4 % (ref 0–6)
ERYTHROCYTE [DISTWIDTH] IN BLOOD BY AUTOMATED COUNT: 12.7 % (ref 11.6–15.1)
GFR SERPL CREATININE-BSD FRML MDRD: 90 ML/MIN/1.73SQ M
GLUCOSE P FAST SERPL-MCNC: 98 MG/DL (ref 65–99)
HCT VFR BLD AUTO: 43.9 % (ref 36.5–49.3)
HDLC SERPL-MCNC: 54 MG/DL
HGB BLD-MCNC: 14.2 G/DL (ref 12–17)
IMM GRANULOCYTES # BLD AUTO: 0.03 THOUSAND/UL (ref 0–0.2)
IMM GRANULOCYTES NFR BLD AUTO: 1 % (ref 0–2)
LDLC SERPL CALC-MCNC: 113 MG/DL (ref 0–100)
LYMPHOCYTES # BLD AUTO: 2.34 THOUSANDS/ΜL (ref 0.6–4.47)
LYMPHOCYTES NFR BLD AUTO: 37 % (ref 14–44)
MCH RBC QN AUTO: 33.2 PG (ref 26.8–34.3)
MCHC RBC AUTO-ENTMCNC: 32.3 G/DL (ref 31.4–37.4)
MCV RBC AUTO: 103 FL (ref 82–98)
MONOCYTES # BLD AUTO: 0.74 THOUSAND/ΜL (ref 0.17–1.22)
MONOCYTES NFR BLD AUTO: 12 % (ref 4–12)
NEUTROPHILS # BLD AUTO: 2.91 THOUSANDS/ΜL (ref 1.85–7.62)
NEUTS SEG NFR BLD AUTO: 45 % (ref 43–75)
NONHDLC SERPL-MCNC: 125 MG/DL
NRBC BLD AUTO-RTO: 0 /100 WBCS
PLATELET # BLD AUTO: 144 THOUSANDS/UL (ref 149–390)
PMV BLD AUTO: 12.1 FL (ref 8.9–12.7)
POTASSIUM SERPL-SCNC: 4.5 MMOL/L (ref 3.5–5.3)
PROT SERPL-MCNC: 6.7 G/DL (ref 6.4–8.4)
RBC # BLD AUTO: 4.28 MILLION/UL (ref 3.88–5.62)
SODIUM SERPL-SCNC: 139 MMOL/L (ref 135–147)
TRIGL SERPL-MCNC: 61 MG/DL (ref ?–150)
WBC # BLD AUTO: 6.33 THOUSAND/UL (ref 4.31–10.16)

## 2025-01-27 PROCEDURE — 36415 COLL VENOUS BLD VENIPUNCTURE: CPT

## 2025-01-27 PROCEDURE — 80053 COMPREHEN METABOLIC PANEL: CPT

## 2025-01-27 PROCEDURE — 85025 COMPLETE CBC W/AUTO DIFF WBC: CPT

## 2025-01-27 PROCEDURE — 80061 LIPID PANEL: CPT

## 2025-02-03 ENCOUNTER — OFFICE VISIT (OUTPATIENT)
Age: 85
End: 2025-02-03
Payer: MEDICARE

## 2025-02-03 VITALS
HEART RATE: 75 BPM | DIASTOLIC BLOOD PRESSURE: 66 MMHG | BODY MASS INDEX: 30.19 KG/M2 | OXYGEN SATURATION: 99 % | HEIGHT: 73 IN | TEMPERATURE: 97.9 F | SYSTOLIC BLOOD PRESSURE: 110 MMHG | WEIGHT: 227.8 LBS

## 2025-02-03 DIAGNOSIS — E78.2 MIXED HYPERLIPIDEMIA: Primary | ICD-10-CM

## 2025-02-03 DIAGNOSIS — I10 ESSENTIAL HYPERTENSION: ICD-10-CM

## 2025-02-03 DIAGNOSIS — D75.89 MACROCYTOSIS WITHOUT ANEMIA: ICD-10-CM

## 2025-02-03 DIAGNOSIS — D69.6 THROMBOCYTOPENIA (HCC): ICD-10-CM

## 2025-02-03 DIAGNOSIS — K21.9 GASTROESOPHAGEAL REFLUX DISEASE WITHOUT ESOPHAGITIS: ICD-10-CM

## 2025-02-03 PROCEDURE — G2211 COMPLEX E/M VISIT ADD ON: HCPCS | Performed by: INTERNAL MEDICINE

## 2025-02-03 PROCEDURE — 99213 OFFICE O/P EST LOW 20 MIN: CPT | Performed by: INTERNAL MEDICINE

## 2025-02-03 NOTE — ASSESSMENT & PLAN NOTE
Very minimal ecchymoses.  Essentially asymptomatic.  Orders:    CBC and differential; Future    Vitamin B12; Future    Folate; Future

## 2025-02-03 NOTE — ASSESSMENT & PLAN NOTE
Pressure is nicely controlled on current medications.  Continue lisinopril 5 mg  Orders:    CBC and differential; Future    Comprehensive metabolic panel; Future

## 2025-02-03 NOTE — ASSESSMENT & PLAN NOTE
Asymptomatic.  Not currently requiring any PPI or H2 blocker therapy.  Orders:    CBC and differential; Future    Comprehensive metabolic panel; Future

## 2025-02-03 NOTE — PROGRESS NOTES
Name: Gerry Taylor      : 1940      MRN: [id number]  Encounter Provider: Jose Juan Walker MD  Encounter Date: 2/3/2025   Encounter department: [facility]  :  Assessment & Plan  Mixed hyperlipidemia  Lipids are near goal levels. No change in therapy        Macrocytosis without anemia  We will check a vitamin B12 and folate levels with future lab work.  Orders:    Vitamin B12; Future    Folate; Future    Essential hypertension  Pressure is nicely controlled on current medications.  Continue lisinopril 5 mg  Orders:    CBC and differential; Future    Comprehensive metabolic panel; Future    Gastroesophageal reflux disease without esophagitis  Asymptomatic.  Not currently requiring any PPI or H2 blocker therapy.  Orders:    CBC and differential; Future    Comprehensive metabolic panel; Future    Thrombocytopenia (HCC)  Very minimal ecchymoses.  Essentially asymptomatic.  Orders:    CBC and differential; Future    Vitamin B12; Future    Folate; Future           History of Present Illness   The patient presents for a follow-up visit after being seen 2 weeks ago for an upper respiratory infection.  He was placed on antibiotics and his symptoms improved over the next 7 days.  His cough is resolved.  He feels well he has no chills or fever.  Appetite is good.  He had no bowel disturbances while taking the antibiotics.  Recent labs are reviewed.  CBC is significant for an MCV of 103 and a platelet count of 144,000.  Otherwise his CBC is normal.  Hemoglobin is 14.2.  White count 6330.  CMP is entirely within normal limits.  Lipid profile is near goal levels.  Total cholesterol 179, triglycerides 61, HDL cholesterol 54 and LDL cholesterol 113.      Review of Systems   Constitutional: Negative.  Negative for activity change, appetite change, chills, diaphoresis, fatigue, fever and unexpected weight change.   HENT: Negative.     Eyes: Negative.    Respiratory: Negative.    "  Cardiovascular: Negative.    Gastrointestinal: Negative.    Endocrine: Negative.    Genitourinary: Negative.    Musculoskeletal: Negative.    Skin: Negative.    Neurological: Negative.    Hematological: Negative.    Psychiatric/Behavioral:  The patient is not nervous/anxious.        Objective   /66 (BP Location: Left arm, Patient Position: Sitting, Cuff Size: Standard)   Pulse 75   Temp 97.9 °F (36.6 °C) (Temporal)   Ht 6' 1.23\" (1.86 m)   Wt 103 kg (227 lb 12.8 oz)   SpO2 99%   BMI 29.87 kg/m²      Physical Exam  Vitals reviewed.   Constitutional:       General: He is not in acute distress.     Appearance: Normal appearance. He is normal weight. He is not ill-appearing, toxic-appearing or diaphoretic.   HENT:      Head: Normocephalic and atraumatic.      Right Ear: External ear normal.      Left Ear: External ear normal.      Nose: Nose normal.      Mouth/Throat:      Mouth: Mucous membranes are moist.   Eyes:      General: No scleral icterus.     Conjunctiva/sclera: Conjunctivae normal.      Pupils: Pupils are equal, round, and reactive to light.   Neck:      Vascular: No carotid bruit.   Cardiovascular:      Rate and Rhythm: Normal rate and regular rhythm.      Heart sounds: Normal heart sounds. No murmur heard.  Pulmonary:      Effort: Pulmonary effort is normal. No respiratory distress.      Breath sounds: Normal breath sounds.   Abdominal:      General: Abdomen is flat. Bowel sounds are normal. There is no distension.   Musculoskeletal:      Cervical back: Neck supple.      Right lower leg: No edema.      Left lower leg: No edema.   Lymphadenopathy:      Cervical: No cervical adenopathy.   Skin:     General: Skin is warm and dry.      Coloration: Skin is not jaundiced.   Neurological:      General: No focal deficit present.      Mental Status: He is alert and oriented to person, place, and time. Mental status is at baseline.   Psychiatric:         Mood and Affect: Mood normal.         Behavior: " Behavior normal.

## 2025-02-25 ENCOUNTER — OFFICE VISIT (OUTPATIENT)
Age: 85
End: 2025-02-25
Payer: MEDICARE

## 2025-02-25 VITALS
SYSTOLIC BLOOD PRESSURE: 132 MMHG | TEMPERATURE: 98.7 F | DIASTOLIC BLOOD PRESSURE: 78 MMHG | HEIGHT: 73 IN | WEIGHT: 228 LBS | OXYGEN SATURATION: 98 % | BODY MASS INDEX: 30.22 KG/M2 | HEART RATE: 69 BPM

## 2025-02-25 DIAGNOSIS — J00 ACUTE RHINITIS: ICD-10-CM

## 2025-02-25 DIAGNOSIS — R42 EPISODE OF DIZZINESS: ICD-10-CM

## 2025-02-25 DIAGNOSIS — I10 ESSENTIAL HYPERTENSION: Primary | ICD-10-CM

## 2025-02-25 PROCEDURE — 99213 OFFICE O/P EST LOW 20 MIN: CPT | Performed by: INTERNAL MEDICINE

## 2025-02-25 PROCEDURE — G2211 COMPLEX E/M VISIT ADD ON: HCPCS | Performed by: INTERNAL MEDICINE

## 2025-02-25 NOTE — ASSESSMENT & PLAN NOTE
No orthostatic changes noted.  Symptoms resolved quickly and appear to be positional/postural

## 2025-02-25 NOTE — PROGRESS NOTES
"Name: Gerry Taylor      : 1940      MRN: [id number]  Encounter Provider: Jose Juan Walker MD  Encounter Date: 2025   Encounter department: [facility]  :  Assessment & Plan  Essential hypertension  Pressure stable and controlled on lisinopril 5 mg         Episode of dizziness  No orthostatic changes noted.  Symptoms resolved quickly and appear to be positional/postural         Acute rhinitis  Recommended the use of saline nasal spray, OTC decongestants, Mucinex as needed                History of Present Illness   Patient presents to the office with complaints of dizziness upon arising out of bed that lasts a few seconds and resolves and does not recur for the remainder of the day.  He also complains of some postnasal drip and nasal congestion.  Denies sneezing, no sore throat.  No cough.  No chills or fever.  Denies palpitations or dyspnea.  Symptoms have been present for 2 to 3 days.      Review of Systems   Constitutional: Negative.    HENT:  Positive for rhinorrhea. Negative for sinus pressure, sinus pain and sore throat.    Eyes: Negative.    Respiratory: Negative.     Cardiovascular: Negative.    Gastrointestinal: Negative.    Endocrine: Negative.    Genitourinary: Negative.    Musculoskeletal: Negative.    Skin: Negative.    Allergic/Immunologic: Negative.    Neurological:  Positive for dizziness (Transient).   Hematological: Negative.    Psychiatric/Behavioral: Negative.         Objective   /78 (BP Location: Left arm, Patient Position: Sitting, Cuff Size: Adult)   Pulse 69   Temp 98.7 °F (37.1 °C)   Ht 6' 1\" (1.854 m)   Wt 103 kg (228 lb)   SpO2 98%   BMI 30.08 kg/m²      Physical Exam  Vitals reviewed.   Constitutional:       General: He is not in acute distress.     Appearance: Normal appearance. He is not ill-appearing, toxic-appearing or diaphoretic.   HENT:      Head: Normocephalic and atraumatic.      Right Ear: External ear normal. "      Left Ear: External ear normal.      Nose: Nose normal.      Mouth/Throat:      Mouth: Mucous membranes are moist.   Eyes:      General: No scleral icterus.     Conjunctiva/sclera: Conjunctivae normal.      Pupils: Pupils are equal, round, and reactive to light.   Cardiovascular:      Rate and Rhythm: Normal rate and regular rhythm.      Heart sounds: Murmur (Soft 1/6 murmur heard along the left sternal border) heard.   Pulmonary:      Effort: Pulmonary effort is normal. No respiratory distress.      Breath sounds: Normal breath sounds.   Abdominal:      General: Abdomen is flat. There is no distension.   Musculoskeletal:      Cervical back: Neck supple.      Right lower leg: No edema.      Left lower leg: No edema.   Skin:     General: Skin is warm.      Coloration: Skin is not jaundiced.      Findings: No bruising, erythema or rash.   Neurological:      General: No focal deficit present.      Mental Status: He is alert and oriented to person, place, and time. Mental status is at baseline.   Psychiatric:         Mood and Affect: Mood normal.         Behavior: Behavior normal.

## 2025-03-11 DIAGNOSIS — I10 ESSENTIAL HYPERTENSION: ICD-10-CM

## 2025-03-13 RX ORDER — LISINOPRIL 5 MG/1
5 TABLET ORAL DAILY
Qty: 90 TABLET | Refills: 1 | Status: SHIPPED | OUTPATIENT
Start: 2025-03-13

## 2025-08-06 DIAGNOSIS — I10 ESSENTIAL HYPERTENSION: ICD-10-CM

## 2025-08-08 RX ORDER — LISINOPRIL 5 MG/1
5 TABLET ORAL DAILY
Qty: 90 TABLET | Refills: 1 | Status: SHIPPED | OUTPATIENT
Start: 2025-08-08